# Patient Record
Sex: FEMALE | Race: WHITE | Employment: OTHER | URBAN - METROPOLITAN AREA
[De-identification: names, ages, dates, MRNs, and addresses within clinical notes are randomized per-mention and may not be internally consistent; named-entity substitution may affect disease eponyms.]

---

## 2017-02-27 ENCOUNTER — OFFICE VISIT (OUTPATIENT)
Dept: CARDIOLOGY CLINIC | Age: 72
End: 2017-02-27

## 2017-02-27 VITALS
HEART RATE: 70 BPM | BODY MASS INDEX: 33.45 KG/M2 | WEIGHT: 200.8 LBS | HEIGHT: 65 IN | DIASTOLIC BLOOD PRESSURE: 88 MMHG | SYSTOLIC BLOOD PRESSURE: 122 MMHG

## 2017-02-27 DIAGNOSIS — E78.2 MIXED HYPERLIPIDEMIA: ICD-10-CM

## 2017-02-27 DIAGNOSIS — I10 ESSENTIAL HYPERTENSION: Primary | ICD-10-CM

## 2017-02-27 DIAGNOSIS — Z82.49 FH: CAD (CORONARY ARTERY DISEASE): ICD-10-CM

## 2017-02-27 PROCEDURE — 1036F TOBACCO NON-USER: CPT | Performed by: INTERNAL MEDICINE

## 2017-02-27 PROCEDURE — 1090F PRES/ABSN URINE INCON ASSESS: CPT | Performed by: INTERNAL MEDICINE

## 2017-02-27 PROCEDURE — 3014F SCREEN MAMMO DOC REV: CPT | Performed by: INTERNAL MEDICINE

## 2017-02-27 PROCEDURE — 99214 OFFICE O/P EST MOD 30 MIN: CPT | Performed by: INTERNAL MEDICINE

## 2017-02-27 PROCEDURE — 1123F ACP DISCUSS/DSCN MKR DOCD: CPT | Performed by: INTERNAL MEDICINE

## 2017-02-27 PROCEDURE — G8427 DOCREV CUR MEDS BY ELIG CLIN: HCPCS | Performed by: INTERNAL MEDICINE

## 2017-02-27 PROCEDURE — 3017F COLORECTAL CA SCREEN DOC REV: CPT | Performed by: INTERNAL MEDICINE

## 2017-02-27 PROCEDURE — G8484 FLU IMMUNIZE NO ADMIN: HCPCS | Performed by: INTERNAL MEDICINE

## 2017-02-27 PROCEDURE — G8419 CALC BMI OUT NRM PARAM NOF/U: HCPCS | Performed by: INTERNAL MEDICINE

## 2017-02-27 PROCEDURE — 4040F PNEUMOC VAC/ADMIN/RCVD: CPT | Performed by: INTERNAL MEDICINE

## 2017-02-27 PROCEDURE — G8399 PT W/DXA RESULTS DOCUMENT: HCPCS | Performed by: INTERNAL MEDICINE

## 2017-03-01 RX ORDER — HYDROCHLOROTHIAZIDE 25 MG/1
TABLET ORAL
Qty: 90 TABLET | Refills: 2 | Status: SHIPPED | OUTPATIENT
Start: 2017-03-01 | End: 2017-12-02 | Stop reason: SDUPTHER

## 2017-03-01 RX ORDER — LISINOPRIL 40 MG/1
TABLET ORAL
Qty: 90 TABLET | Refills: 3 | Status: SHIPPED | OUTPATIENT
Start: 2017-03-01 | End: 2018-02-26 | Stop reason: SDUPTHER

## 2017-03-23 RX ORDER — LEVOTHYROXINE SODIUM 0.1 MG/1
TABLET ORAL
Qty: 90 TABLET | Refills: 3 | Status: SHIPPED | OUTPATIENT
Start: 2017-03-23 | End: 2017-12-05 | Stop reason: SDUPTHER

## 2017-04-06 ENCOUNTER — OFFICE VISIT (OUTPATIENT)
Dept: INTERNAL MEDICINE CLINIC | Age: 72
End: 2017-04-06

## 2017-04-06 VITALS
BODY MASS INDEX: 32.78 KG/M2 | WEIGHT: 197 LBS | TEMPERATURE: 98.2 F | DIASTOLIC BLOOD PRESSURE: 78 MMHG | SYSTOLIC BLOOD PRESSURE: 126 MMHG | RESPIRATION RATE: 18 BRPM | HEART RATE: 84 BPM

## 2017-04-06 DIAGNOSIS — R50.81 FEVER IN OTHER DISEASES: Primary | ICD-10-CM

## 2017-04-06 PROCEDURE — G8399 PT W/DXA RESULTS DOCUMENT: HCPCS | Performed by: INTERNAL MEDICINE

## 2017-04-06 PROCEDURE — G8417 CALC BMI ABV UP PARAM F/U: HCPCS | Performed by: INTERNAL MEDICINE

## 2017-04-06 PROCEDURE — 1036F TOBACCO NON-USER: CPT | Performed by: INTERNAL MEDICINE

## 2017-04-06 PROCEDURE — G8427 DOCREV CUR MEDS BY ELIG CLIN: HCPCS | Performed by: INTERNAL MEDICINE

## 2017-04-06 PROCEDURE — 3017F COLORECTAL CA SCREEN DOC REV: CPT | Performed by: INTERNAL MEDICINE

## 2017-04-06 PROCEDURE — 1090F PRES/ABSN URINE INCON ASSESS: CPT | Performed by: INTERNAL MEDICINE

## 2017-04-06 PROCEDURE — 1123F ACP DISCUSS/DSCN MKR DOCD: CPT | Performed by: INTERNAL MEDICINE

## 2017-04-06 PROCEDURE — 3014F SCREEN MAMMO DOC REV: CPT | Performed by: INTERNAL MEDICINE

## 2017-04-06 PROCEDURE — 4040F PNEUMOC VAC/ADMIN/RCVD: CPT | Performed by: INTERNAL MEDICINE

## 2017-04-06 PROCEDURE — 99213 OFFICE O/P EST LOW 20 MIN: CPT | Performed by: INTERNAL MEDICINE

## 2017-04-06 RX ORDER — AZITHROMYCIN 250 MG/1
TABLET, FILM COATED ORAL
Qty: 6 TABLET | Refills: 0 | Status: SHIPPED | OUTPATIENT
Start: 2017-04-06 | End: 2017-06-08 | Stop reason: ALTCHOICE

## 2017-06-06 DIAGNOSIS — Z00.00 PREVENTATIVE HEALTH CARE: ICD-10-CM

## 2017-06-06 DIAGNOSIS — E03.4 HYPOTHYROIDISM DUE TO ACQUIRED ATROPHY OF THYROID: ICD-10-CM

## 2017-06-07 LAB
A/G RATIO: 1.5 (ref 1.1–2.2)
ALBUMIN SERPL-MCNC: 4 G/DL (ref 3.4–5)
ALP BLD-CCNC: 67 U/L (ref 40–129)
ALT SERPL-CCNC: 16 U/L (ref 10–40)
ANION GAP SERPL CALCULATED.3IONS-SCNC: 11 MMOL/L (ref 3–16)
AST SERPL-CCNC: 15 U/L (ref 15–37)
BASOPHILS ABSOLUTE: 0 K/UL (ref 0–0.2)
BASOPHILS RELATIVE PERCENT: 0.5 %
BILIRUB SERPL-MCNC: 0.7 MG/DL (ref 0–1)
BUN BLDV-MCNC: 22 MG/DL (ref 7–20)
CALCIUM SERPL-MCNC: 10.2 MG/DL (ref 8.3–10.6)
CHLORIDE BLD-SCNC: 106 MMOL/L (ref 99–110)
CHOLESTEROL, TOTAL: 172 MG/DL (ref 0–199)
CO2: 26 MMOL/L (ref 21–32)
CREAT SERPL-MCNC: 0.6 MG/DL (ref 0.6–1.2)
EOSINOPHILS ABSOLUTE: 0.1 K/UL (ref 0–0.6)
EOSINOPHILS RELATIVE PERCENT: 1.7 %
GFR AFRICAN AMERICAN: >60
GFR NON-AFRICAN AMERICAN: >60
GLOBULIN: 2.7 G/DL
GLUCOSE BLD-MCNC: 97 MG/DL (ref 70–99)
HCT VFR BLD CALC: 39.4 % (ref 36–48)
HDLC SERPL-MCNC: 53 MG/DL (ref 40–60)
HEMOGLOBIN: 12.8 G/DL (ref 12–16)
HEPATITIS C ANTIBODY INTERPRETATION: NORMAL
LDL CHOLESTEROL CALCULATED: 81 MG/DL
LYMPHOCYTES ABSOLUTE: 3.2 K/UL (ref 1–5.1)
LYMPHOCYTES RELATIVE PERCENT: 41.5 %
MCH RBC QN AUTO: 29.4 PG (ref 26–34)
MCHC RBC AUTO-ENTMCNC: 32.5 G/DL (ref 31–36)
MCV RBC AUTO: 90.6 FL (ref 80–100)
MONOCYTES ABSOLUTE: 0.5 K/UL (ref 0–1.3)
MONOCYTES RELATIVE PERCENT: 6.4 %
NEUTROPHILS ABSOLUTE: 3.8 K/UL (ref 1.7–7.7)
NEUTROPHILS RELATIVE PERCENT: 49.9 %
PDW BLD-RTO: 14.2 % (ref 12.4–15.4)
PLATELET # BLD: 242 K/UL (ref 135–450)
PMV BLD AUTO: 9.2 FL (ref 5–10.5)
POTASSIUM SERPL-SCNC: 4.1 MMOL/L (ref 3.5–5.1)
RBC # BLD: 4.35 M/UL (ref 4–5.2)
SODIUM BLD-SCNC: 143 MMOL/L (ref 136–145)
TOTAL PROTEIN: 6.7 G/DL (ref 6.4–8.2)
TRIGL SERPL-MCNC: 191 MG/DL (ref 0–150)
TSH REFLEX: 2.13 UIU/ML (ref 0.27–4.2)
VLDLC SERPL CALC-MCNC: 38 MG/DL
WBC # BLD: 7.6 K/UL (ref 4–11)

## 2017-06-08 ENCOUNTER — OFFICE VISIT (OUTPATIENT)
Dept: INTERNAL MEDICINE CLINIC | Age: 72
End: 2017-06-08

## 2017-06-08 VITALS
WEIGHT: 196 LBS | RESPIRATION RATE: 16 BRPM | DIASTOLIC BLOOD PRESSURE: 76 MMHG | HEART RATE: 76 BPM | SYSTOLIC BLOOD PRESSURE: 118 MMHG | BODY MASS INDEX: 32.62 KG/M2

## 2017-06-08 DIAGNOSIS — I10 ESSENTIAL HYPERTENSION: Primary | ICD-10-CM

## 2017-06-08 DIAGNOSIS — E78.2 MIXED HYPERLIPIDEMIA: ICD-10-CM

## 2017-06-08 DIAGNOSIS — E03.4 HYPOTHYROIDISM DUE TO ACQUIRED ATROPHY OF THYROID: ICD-10-CM

## 2017-06-08 DIAGNOSIS — J45.991 COUGH VARIANT ASTHMA: ICD-10-CM

## 2017-06-08 PROCEDURE — 4040F PNEUMOC VAC/ADMIN/RCVD: CPT | Performed by: INTERNAL MEDICINE

## 2017-06-08 PROCEDURE — 3014F SCREEN MAMMO DOC REV: CPT | Performed by: INTERNAL MEDICINE

## 2017-06-08 PROCEDURE — 3017F COLORECTAL CA SCREEN DOC REV: CPT | Performed by: INTERNAL MEDICINE

## 2017-06-08 PROCEDURE — 1090F PRES/ABSN URINE INCON ASSESS: CPT | Performed by: INTERNAL MEDICINE

## 2017-06-08 PROCEDURE — G8417 CALC BMI ABV UP PARAM F/U: HCPCS | Performed by: INTERNAL MEDICINE

## 2017-06-08 PROCEDURE — 99214 OFFICE O/P EST MOD 30 MIN: CPT | Performed by: INTERNAL MEDICINE

## 2017-06-08 PROCEDURE — G8399 PT W/DXA RESULTS DOCUMENT: HCPCS | Performed by: INTERNAL MEDICINE

## 2017-06-08 PROCEDURE — 1123F ACP DISCUSS/DSCN MKR DOCD: CPT | Performed by: INTERNAL MEDICINE

## 2017-06-08 PROCEDURE — G8427 DOCREV CUR MEDS BY ELIG CLIN: HCPCS | Performed by: INTERNAL MEDICINE

## 2017-06-08 PROCEDURE — 1036F TOBACCO NON-USER: CPT | Performed by: INTERNAL MEDICINE

## 2017-06-08 RX ORDER — BUDESONIDE AND FORMOTEROL FUMARATE DIHYDRATE 160; 4.5 UG/1; UG/1
2 AEROSOL RESPIRATORY (INHALATION) EVERY 12 HOURS
Qty: 3 INHALER | Refills: 3 | Status: SHIPPED | OUTPATIENT
Start: 2017-06-08 | End: 2018-12-05 | Stop reason: SDUPTHER

## 2017-07-21 RX ORDER — PRAVASTATIN SODIUM 40 MG
TABLET ORAL
Qty: 90 TABLET | Refills: 3 | Status: SHIPPED | OUTPATIENT
Start: 2017-07-21 | End: 2018-08-31 | Stop reason: SDUPTHER

## 2017-08-28 ENCOUNTER — OFFICE VISIT (OUTPATIENT)
Dept: CARDIOLOGY CLINIC | Age: 72
End: 2017-08-28

## 2017-08-28 VITALS
BODY MASS INDEX: 35.08 KG/M2 | WEIGHT: 198 LBS | HEART RATE: 62 BPM | HEIGHT: 63 IN | DIASTOLIC BLOOD PRESSURE: 72 MMHG | SYSTOLIC BLOOD PRESSURE: 118 MMHG | OXYGEN SATURATION: 98 %

## 2017-08-28 DIAGNOSIS — I10 ESSENTIAL HYPERTENSION: Primary | ICD-10-CM

## 2017-08-28 DIAGNOSIS — Z82.49 FH: CAD (CORONARY ARTERY DISEASE): ICD-10-CM

## 2017-08-28 DIAGNOSIS — E78.2 MIXED HYPERLIPIDEMIA: ICD-10-CM

## 2017-08-28 PROCEDURE — G8399 PT W/DXA RESULTS DOCUMENT: HCPCS | Performed by: INTERNAL MEDICINE

## 2017-08-28 PROCEDURE — 1036F TOBACCO NON-USER: CPT | Performed by: INTERNAL MEDICINE

## 2017-08-28 PROCEDURE — G8427 DOCREV CUR MEDS BY ELIG CLIN: HCPCS | Performed by: INTERNAL MEDICINE

## 2017-08-28 PROCEDURE — 3014F SCREEN MAMMO DOC REV: CPT | Performed by: INTERNAL MEDICINE

## 2017-08-28 PROCEDURE — 3017F COLORECTAL CA SCREEN DOC REV: CPT | Performed by: INTERNAL MEDICINE

## 2017-08-28 PROCEDURE — G8417 CALC BMI ABV UP PARAM F/U: HCPCS | Performed by: INTERNAL MEDICINE

## 2017-08-28 PROCEDURE — 99213 OFFICE O/P EST LOW 20 MIN: CPT | Performed by: INTERNAL MEDICINE

## 2017-08-28 PROCEDURE — 4040F PNEUMOC VAC/ADMIN/RCVD: CPT | Performed by: INTERNAL MEDICINE

## 2017-08-28 PROCEDURE — 1090F PRES/ABSN URINE INCON ASSESS: CPT | Performed by: INTERNAL MEDICINE

## 2017-08-28 PROCEDURE — 1123F ACP DISCUSS/DSCN MKR DOCD: CPT | Performed by: INTERNAL MEDICINE

## 2017-10-31 DIAGNOSIS — R05.9 COUGH: ICD-10-CM

## 2017-10-31 RX ORDER — IPRATROPIUM BROMIDE AND ALBUTEROL SULFATE 2.5; .5 MG/3ML; MG/3ML
1 SOLUTION RESPIRATORY (INHALATION) EVERY 6 HOURS
Qty: 120 VIAL | Refills: 5 | Status: SHIPPED | OUTPATIENT
Start: 2017-10-31 | End: 2021-10-25 | Stop reason: SDUPTHER

## 2017-12-04 RX ORDER — HYDROCHLOROTHIAZIDE 25 MG/1
TABLET ORAL
Qty: 90 TABLET | Refills: 1 | Status: SHIPPED | OUTPATIENT
Start: 2017-12-04 | End: 2018-03-16

## 2017-12-05 ENCOUNTER — OFFICE VISIT (OUTPATIENT)
Dept: INTERNAL MEDICINE CLINIC | Age: 72
End: 2017-12-05

## 2017-12-05 VITALS
WEIGHT: 196 LBS | HEART RATE: 76 BPM | DIASTOLIC BLOOD PRESSURE: 72 MMHG | SYSTOLIC BLOOD PRESSURE: 116 MMHG | OXYGEN SATURATION: 97 % | BODY MASS INDEX: 34.72 KG/M2 | RESPIRATION RATE: 16 BRPM

## 2017-12-05 DIAGNOSIS — E03.4 HYPOTHYROIDISM DUE TO ACQUIRED ATROPHY OF THYROID: ICD-10-CM

## 2017-12-05 DIAGNOSIS — I10 ESSENTIAL HYPERTENSION: ICD-10-CM

## 2017-12-05 DIAGNOSIS — E78.2 MIXED HYPERLIPIDEMIA: ICD-10-CM

## 2017-12-05 DIAGNOSIS — E03.4 HYPOTHYROIDISM DUE TO ACQUIRED ATROPHY OF THYROID: Primary | ICD-10-CM

## 2017-12-05 DIAGNOSIS — R05.9 COUGH: Primary | ICD-10-CM

## 2017-12-05 PROCEDURE — 99214 OFFICE O/P EST MOD 30 MIN: CPT | Performed by: INTERNAL MEDICINE

## 2017-12-05 PROCEDURE — 90662 IIV NO PRSV INCREASED AG IM: CPT | Performed by: INTERNAL MEDICINE

## 2017-12-05 PROCEDURE — 1090F PRES/ABSN URINE INCON ASSESS: CPT | Performed by: INTERNAL MEDICINE

## 2017-12-05 PROCEDURE — 1036F TOBACCO NON-USER: CPT | Performed by: INTERNAL MEDICINE

## 2017-12-05 PROCEDURE — G0008 ADMIN INFLUENZA VIRUS VAC: HCPCS | Performed by: INTERNAL MEDICINE

## 2017-12-05 PROCEDURE — 3014F SCREEN MAMMO DOC REV: CPT | Performed by: INTERNAL MEDICINE

## 2017-12-05 PROCEDURE — G8484 FLU IMMUNIZE NO ADMIN: HCPCS | Performed by: INTERNAL MEDICINE

## 2017-12-05 PROCEDURE — G8427 DOCREV CUR MEDS BY ELIG CLIN: HCPCS | Performed by: INTERNAL MEDICINE

## 2017-12-05 PROCEDURE — 3017F COLORECTAL CA SCREEN DOC REV: CPT | Performed by: INTERNAL MEDICINE

## 2017-12-05 PROCEDURE — 1123F ACP DISCUSS/DSCN MKR DOCD: CPT | Performed by: INTERNAL MEDICINE

## 2017-12-05 PROCEDURE — 4040F PNEUMOC VAC/ADMIN/RCVD: CPT | Performed by: INTERNAL MEDICINE

## 2017-12-05 PROCEDURE — G8417 CALC BMI ABV UP PARAM F/U: HCPCS | Performed by: INTERNAL MEDICINE

## 2017-12-05 PROCEDURE — G8399 PT W/DXA RESULTS DOCUMENT: HCPCS | Performed by: INTERNAL MEDICINE

## 2017-12-05 RX ORDER — LEVOTHYROXINE SODIUM 112 UG/1
TABLET ORAL
Qty: 90 TABLET | Refills: 1 | Status: SHIPPED | OUTPATIENT
Start: 2017-12-05 | End: 2018-06-04 | Stop reason: SDUPTHER

## 2017-12-05 RX ORDER — OMEPRAZOLE 40 MG/1
40 CAPSULE, DELAYED RELEASE ORAL
Qty: 30 CAPSULE | Refills: 3 | Status: SHIPPED | OUTPATIENT
Start: 2017-12-05 | End: 2018-11-20

## 2017-12-05 NOTE — PROGRESS NOTES
Andrei Gavin  1945 12/05/17    SUBJECTIVE:      Pt with 3 months of a cough she often wakes at night with the cough. Sometimes the cough is productive of thick white or yellow sputum. She denies any SOB, wheezing. She does have intermittent heartburn. Pt is going to start the botox injections for the torticullis. The patient is taking hypertensive medications compliantly without side effects. Denies chest pain, dyspnea, edema, or TIA's. Patient denies any chest pain, shortness of breath, myalgias, Patient is tolerating cholesterol medications without difficulty. Pt continues on synthroid for hypothyroidism     OBJECTIVE:    /72   Pulse 76   Resp 16   Wt 196 lb (88.9 kg)   SpO2 97%   Breastfeeding? No   BMI 34.72 kg/m²     Physical Exam   Constitutional: She is oriented to person, place, and time. She appears well-developed and well-nourished. Eyes: Conjunctivae are normal. No scleral icterus. Neck: Neck supple. No tracheal deviation present. No thyromegaly present. Cardiovascular: Normal rate, regular rhythm and intact distal pulses. Exam reveals no gallop and no friction rub. No murmur heard. Pulmonary/Chest: No respiratory distress. She has no wheezes. She has no rales. Abdominal: Soft. Bowel sounds are normal. She exhibits no distension and no mass. There is no hepatomegaly. There is no tenderness. There is no rebound and no guarding. Lymphadenopathy:     She has no cervical adenopathy. Neurological: She is alert and oriented to person, place, and time. Skin: No cyanosis. Nails show no clubbing. Psychiatric: She has a normal mood and affect. Her behavior is normal. Judgment normal.       ASSESSMENT:    1. Cough    2. Essential hypertension    3. Mixed hyperlipidemia    4. Hypothyroidism due to acquired atrophy of thyroid        PLAN:    Celia was seen today for 6 month follow-up and cough.     Diagnoses and all orders for this visit:    Cough - will try omeprazole, and if not improving pt will call in 2 weeks at which time I will stop the omeprazole and start claritin D  -     omeprazole (PRILOSEC) 40 MG delayed release capsule; Take 1 capsule by mouth daily (with breakfast)    Essential hypertension - at goal, no change; check labs  -     Comprehensive Metabolic Panel; Future  -     Lipid Panel; Future  -     CBC Auto Differential; Future    Mixed hyperlipidemia - check labs  -     Comprehensive Metabolic Panel; Future  -     Lipid Panel; Future    Hypothyroidism due to acquired atrophy of thyroid - check labs  -     TSH with Reflex;  Future    Other orders  -     INFLUENZA, HIGH DOSE, 65 YRS +, IM, PF, PREFILL SYR, 0.5ML (FLUZONE HD)

## 2018-02-01 RX ORDER — METOPROLOL SUCCINATE 50 MG/1
TABLET, EXTENDED RELEASE ORAL
Qty: 90 TABLET | Refills: 3 | Status: SHIPPED | OUTPATIENT
Start: 2018-02-01 | End: 2019-04-12 | Stop reason: SDUPTHER

## 2018-02-26 RX ORDER — LISINOPRIL 40 MG/1
TABLET ORAL
Qty: 90 TABLET | Refills: 3 | Status: SHIPPED | OUTPATIENT
Start: 2018-02-26 | End: 2019-03-30 | Stop reason: SDUPTHER

## 2018-03-16 ENCOUNTER — OFFICE VISIT (OUTPATIENT)
Dept: INTERNAL MEDICINE CLINIC | Age: 73
End: 2018-03-16

## 2018-03-16 VITALS
RESPIRATION RATE: 16 BRPM | SYSTOLIC BLOOD PRESSURE: 140 MMHG | OXYGEN SATURATION: 98 % | WEIGHT: 196 LBS | HEART RATE: 70 BPM | BODY MASS INDEX: 34.72 KG/M2 | DIASTOLIC BLOOD PRESSURE: 70 MMHG

## 2018-03-16 DIAGNOSIS — I10 ESSENTIAL HYPERTENSION: ICD-10-CM

## 2018-03-16 DIAGNOSIS — M10.072 ACUTE IDIOPATHIC GOUT INVOLVING TOE OF LEFT FOOT: Primary | ICD-10-CM

## 2018-03-16 PROCEDURE — 3014F SCREEN MAMMO DOC REV: CPT | Performed by: INTERNAL MEDICINE

## 2018-03-16 PROCEDURE — 1090F PRES/ABSN URINE INCON ASSESS: CPT | Performed by: INTERNAL MEDICINE

## 2018-03-16 PROCEDURE — 1036F TOBACCO NON-USER: CPT | Performed by: INTERNAL MEDICINE

## 2018-03-16 PROCEDURE — G8399 PT W/DXA RESULTS DOCUMENT: HCPCS | Performed by: INTERNAL MEDICINE

## 2018-03-16 PROCEDURE — G8482 FLU IMMUNIZE ORDER/ADMIN: HCPCS | Performed by: INTERNAL MEDICINE

## 2018-03-16 PROCEDURE — G8417 CALC BMI ABV UP PARAM F/U: HCPCS | Performed by: INTERNAL MEDICINE

## 2018-03-16 PROCEDURE — G8427 DOCREV CUR MEDS BY ELIG CLIN: HCPCS | Performed by: INTERNAL MEDICINE

## 2018-03-16 PROCEDURE — 1123F ACP DISCUSS/DSCN MKR DOCD: CPT | Performed by: INTERNAL MEDICINE

## 2018-03-16 PROCEDURE — G8510 SCR DEP NEG, NO PLAN REQD: HCPCS | Performed by: INTERNAL MEDICINE

## 2018-03-16 PROCEDURE — 99213 OFFICE O/P EST LOW 20 MIN: CPT | Performed by: INTERNAL MEDICINE

## 2018-03-16 PROCEDURE — 3288F FALL RISK ASSESSMENT DOCD: CPT | Performed by: INTERNAL MEDICINE

## 2018-03-16 PROCEDURE — 3017F COLORECTAL CA SCREEN DOC REV: CPT | Performed by: INTERNAL MEDICINE

## 2018-03-16 PROCEDURE — 4040F PNEUMOC VAC/ADMIN/RCVD: CPT | Performed by: INTERNAL MEDICINE

## 2018-03-16 RX ORDER — COLCHICINE 0.6 MG/1
0.6 TABLET ORAL 2 TIMES DAILY
Qty: 60 TABLET | Refills: 5 | Status: SHIPPED | OUTPATIENT
Start: 2018-03-16

## 2018-03-16 RX ORDER — SPIRONOLACTONE 25 MG/1
25 TABLET ORAL DAILY
Qty: 30 TABLET | Refills: 5 | Status: SHIPPED | OUTPATIENT
Start: 2018-03-16 | End: 2018-09-25 | Stop reason: SDUPTHER

## 2018-03-16 ASSESSMENT — PATIENT HEALTH QUESTIONNAIRE - PHQ9
2. FEELING DOWN, DEPRESSED OR HOPELESS: 0
SUM OF ALL RESPONSES TO PHQ9 QUESTIONS 1 & 2: 0
SUM OF ALL RESPONSES TO PHQ QUESTIONS 1-9: 0
1. LITTLE INTEREST OR PLEASURE IN DOING THINGS: 0

## 2018-03-16 NOTE — PROGRESS NOTES
[facility]  1945 03/16/18    SUBJECTIVE:    Pt complains of 1st left great toe pain, swelling, erythema, heat. This happened a few months ago and resolved, then recurred yesterday morning. Last night the pain was terrible. OBJECTIVE:    BP (!) 140/70   Pulse 70   Resp 16   Wt 196 lb (88.9 kg)   SpO2 98%   Breastfeeding? No   BMI 34.72 kg/m²     Physical Exam    Swelling, erythema, warmth, tenderness left 1st MP joint - swelling involves entire foot. ASSESSMENT:    1. Acute idiopathic gout involving toe of left foot    2. Essential hypertension        PLAN:    Gee Juarez was seen today for foot pain. Diagnoses and all orders for this visit:     Acute idiopathic gout involving toe of left foot - start colchicine  -     colchicine (COLCRYS) 0.6 MG tablet; Take 1 tablet by mouth 2 times daily  -     Basic Metabolic Panel; Future    Essential hypertension - stop hctz (may contgribute to gout). Start aldactone, check BMP wednesday  -     spironolactone (ALDACTONE) 25 MG tablet; Take 1 tablet by mouth daily  -     Basic Metabolic Panel;  Future

## 2018-03-23 LAB
BUN / CREAT RATIO: 28 (CALC) (ref 7–25)
BUN BLDV-MCNC: 22 MG/DL (ref 3–29)
CALCIUM SERPL-MCNC: 10.3 MG/DL (ref 8.5–10.5)
CHLORIDE BLD-SCNC: 107 MEQ/L (ref 96–110)
CO2: 26 MEQ/L (ref 19–32)
COPY(IES) SENT TO:: NORMAL
CREAT SERPL-MCNC: 0.8 MG/DL
GFR SERPL CREATININE-BSD FRML MDRD: 74 ML/MIN/1.73M2
GLUCOSE BLD-MCNC: 96 MG/DL
POTASSIUM SERPL-SCNC: 4.2 MEQ/L (ref 3.4–5.3)
SODIUM BLD-SCNC: 144 MEQ/L (ref 135–148)

## 2018-03-27 ENCOUNTER — HOSPITAL ENCOUNTER (OUTPATIENT)
Dept: GENERAL RADIOLOGY | Age: 73
Discharge: HOME | End: 2018-03-27
Attending: INTERNAL MEDICINE | Admitting: INTERNAL MEDICINE

## 2018-03-27 DIAGNOSIS — M79.672 LEFT FOOT PAIN: ICD-10-CM

## 2018-03-27 DIAGNOSIS — M10.072 IDIOPATHIC GOUT OF LEFT FOOT, UNSPECIFIED CHRONICITY: ICD-10-CM

## 2018-03-27 DIAGNOSIS — M79.672 LEFT FOOT PAIN: Primary | ICD-10-CM

## 2018-03-27 RX ORDER — PREDNISONE 10 MG/1
TABLET ORAL
Qty: 20 TABLET | Refills: 0 | Status: SHIPPED | OUTPATIENT
Start: 2018-03-27 | End: 2018-04-11 | Stop reason: ALTCHOICE

## 2018-04-11 ENCOUNTER — OFFICE VISIT (OUTPATIENT)
Dept: INTERNAL MEDICINE CLINIC | Age: 73
End: 2018-04-11

## 2018-04-11 VITALS
RESPIRATION RATE: 16 BRPM | BODY MASS INDEX: 34.54 KG/M2 | SYSTOLIC BLOOD PRESSURE: 130 MMHG | DIASTOLIC BLOOD PRESSURE: 76 MMHG | HEART RATE: 80 BPM | WEIGHT: 195 LBS

## 2018-04-11 DIAGNOSIS — M10.072 ACUTE IDIOPATHIC GOUT INVOLVING TOE OF LEFT FOOT: Primary | ICD-10-CM

## 2018-04-11 DIAGNOSIS — Z12.31 ENCOUNTER FOR SCREENING MAMMOGRAM FOR BREAST CANCER: ICD-10-CM

## 2018-04-11 PROCEDURE — G8427 DOCREV CUR MEDS BY ELIG CLIN: HCPCS | Performed by: INTERNAL MEDICINE

## 2018-04-11 PROCEDURE — 3017F COLORECTAL CA SCREEN DOC REV: CPT | Performed by: INTERNAL MEDICINE

## 2018-04-11 PROCEDURE — 3014F SCREEN MAMMO DOC REV: CPT | Performed by: INTERNAL MEDICINE

## 2018-04-11 PROCEDURE — G8417 CALC BMI ABV UP PARAM F/U: HCPCS | Performed by: INTERNAL MEDICINE

## 2018-04-11 PROCEDURE — 99213 OFFICE O/P EST LOW 20 MIN: CPT | Performed by: INTERNAL MEDICINE

## 2018-04-11 PROCEDURE — 1123F ACP DISCUSS/DSCN MKR DOCD: CPT | Performed by: INTERNAL MEDICINE

## 2018-04-11 PROCEDURE — G8399 PT W/DXA RESULTS DOCUMENT: HCPCS | Performed by: INTERNAL MEDICINE

## 2018-04-11 PROCEDURE — 1090F PRES/ABSN URINE INCON ASSESS: CPT | Performed by: INTERNAL MEDICINE

## 2018-04-11 PROCEDURE — 1036F TOBACCO NON-USER: CPT | Performed by: INTERNAL MEDICINE

## 2018-04-11 PROCEDURE — 4040F PNEUMOC VAC/ADMIN/RCVD: CPT | Performed by: INTERNAL MEDICINE

## 2018-04-11 RX ORDER — ALLOPURINOL 100 MG/1
100 TABLET ORAL DAILY
Qty: 30 TABLET | Refills: 11 | Status: SHIPPED | OUTPATIENT
Start: 2018-04-11 | End: 2019-07-01 | Stop reason: SDUPTHER

## 2018-04-17 ENCOUNTER — HOSPITAL ENCOUNTER (OUTPATIENT)
Dept: WOMENS IMAGING | Age: 73
Discharge: HOME | End: 2018-04-17
Attending: INTERNAL MEDICINE | Admitting: INTERNAL MEDICINE

## 2018-04-17 DIAGNOSIS — Z12.31 ENCOUNTER FOR SCREENING MAMMOGRAM FOR BREAST CANCER: ICD-10-CM

## 2018-04-26 ENCOUNTER — OFFICE VISIT (OUTPATIENT)
Dept: CARDIOLOGY CLINIC | Age: 73
End: 2018-04-26

## 2018-04-26 VITALS
HEART RATE: 72 BPM | DIASTOLIC BLOOD PRESSURE: 66 MMHG | SYSTOLIC BLOOD PRESSURE: 92 MMHG | BODY MASS INDEX: 32.49 KG/M2 | WEIGHT: 195 LBS | HEIGHT: 65 IN

## 2018-04-26 DIAGNOSIS — E78.2 MIXED HYPERLIPIDEMIA: Primary | ICD-10-CM

## 2018-04-26 DIAGNOSIS — I10 ESSENTIAL HYPERTENSION: ICD-10-CM

## 2018-04-26 DIAGNOSIS — Z82.49 FH: CAD (CORONARY ARTERY DISEASE): ICD-10-CM

## 2018-04-26 DIAGNOSIS — R00.2 PALPITATIONS: ICD-10-CM

## 2018-04-26 PROCEDURE — G8417 CALC BMI ABV UP PARAM F/U: HCPCS | Performed by: INTERNAL MEDICINE

## 2018-04-26 PROCEDURE — 4040F PNEUMOC VAC/ADMIN/RCVD: CPT | Performed by: INTERNAL MEDICINE

## 2018-04-26 PROCEDURE — 1090F PRES/ABSN URINE INCON ASSESS: CPT | Performed by: INTERNAL MEDICINE

## 2018-04-26 PROCEDURE — G8399 PT W/DXA RESULTS DOCUMENT: HCPCS | Performed by: INTERNAL MEDICINE

## 2018-04-26 PROCEDURE — 99214 OFFICE O/P EST MOD 30 MIN: CPT | Performed by: INTERNAL MEDICINE

## 2018-04-26 PROCEDURE — 3017F COLORECTAL CA SCREEN DOC REV: CPT | Performed by: INTERNAL MEDICINE

## 2018-04-26 PROCEDURE — 1123F ACP DISCUSS/DSCN MKR DOCD: CPT | Performed by: INTERNAL MEDICINE

## 2018-04-26 PROCEDURE — 1036F TOBACCO NON-USER: CPT | Performed by: INTERNAL MEDICINE

## 2018-04-26 PROCEDURE — G8427 DOCREV CUR MEDS BY ELIG CLIN: HCPCS | Performed by: INTERNAL MEDICINE

## 2018-05-03 ENCOUNTER — HOSPITAL ENCOUNTER (OUTPATIENT)
Dept: WOMENS IMAGING | Age: 73
Discharge: HOME | End: 2018-05-03
Attending: INTERNAL MEDICINE | Admitting: INTERNAL MEDICINE

## 2018-05-03 DIAGNOSIS — R92.8 ABNORMAL MAMMOGRAM: ICD-10-CM

## 2018-05-03 DIAGNOSIS — N64.89 BREAST ASYMMETRY: ICD-10-CM

## 2018-06-04 DIAGNOSIS — E03.4 HYPOTHYROIDISM DUE TO ACQUIRED ATROPHY OF THYROID: ICD-10-CM

## 2018-06-04 DIAGNOSIS — E83.52 SERUM CALCIUM ELEVATED: ICD-10-CM

## 2018-06-04 DIAGNOSIS — I10 ESSENTIAL HYPERTENSION: Primary | ICD-10-CM

## 2018-06-04 RX ORDER — LEVOTHYROXINE SODIUM 0.12 MG/1
TABLET ORAL
Qty: 90 TABLET | Refills: 3 | Status: SHIPPED | OUTPATIENT
Start: 2018-06-04 | End: 2019-08-01 | Stop reason: SDUPTHER

## 2018-06-05 ENCOUNTER — OFFICE VISIT (OUTPATIENT)
Dept: INTERNAL MEDICINE CLINIC | Age: 73
End: 2018-06-05

## 2018-06-05 VITALS
OXYGEN SATURATION: 98 % | SYSTOLIC BLOOD PRESSURE: 114 MMHG | WEIGHT: 195.2 LBS | HEART RATE: 61 BPM | DIASTOLIC BLOOD PRESSURE: 80 MMHG | RESPIRATION RATE: 18 BRPM | BODY MASS INDEX: 32.99 KG/M2

## 2018-06-05 DIAGNOSIS — E03.4 HYPOTHYROIDISM DUE TO ACQUIRED ATROPHY OF THYROID: Primary | ICD-10-CM

## 2018-06-05 DIAGNOSIS — E83.52 HYPERCALCEMIA: ICD-10-CM

## 2018-06-05 DIAGNOSIS — E83.52 SERUM CALCIUM ELEVATED: ICD-10-CM

## 2018-06-05 DIAGNOSIS — J45.30 MILD PERSISTENT ASTHMA WITHOUT COMPLICATION: ICD-10-CM

## 2018-06-05 DIAGNOSIS — E78.2 MIXED HYPERLIPIDEMIA: ICD-10-CM

## 2018-06-05 DIAGNOSIS — I10 ESSENTIAL HYPERTENSION: ICD-10-CM

## 2018-06-05 DIAGNOSIS — E03.4 HYPOTHYROIDISM DUE TO ACQUIRED ATROPHY OF THYROID: ICD-10-CM

## 2018-06-05 LAB
ANION GAP SERPL CALCULATED.3IONS-SCNC: 14 MMOL/L (ref 3–16)
BUN BLDV-MCNC: 18 MG/DL (ref 7–20)
CALCIUM SERPL-MCNC: 10.2 MG/DL (ref 8.3–10.6)
CHLORIDE BLD-SCNC: 106 MMOL/L (ref 99–110)
CO2: 25 MMOL/L (ref 21–32)
CREAT SERPL-MCNC: 0.6 MG/DL (ref 0.6–1.2)
GFR AFRICAN AMERICAN: >60
GFR NON-AFRICAN AMERICAN: >60
GLUCOSE BLD-MCNC: 100 MG/DL (ref 70–99)
PARATHYROID HORMONE INTACT: 72 PG/ML (ref 14–72)
PHOSPHORUS: 3.1 MG/DL (ref 2.5–4.9)
POTASSIUM SERPL-SCNC: 4.6 MMOL/L (ref 3.5–5.1)
SODIUM BLD-SCNC: 145 MMOL/L (ref 136–145)
VITAMIN D 25-HYDROXY: 23.3 NG/ML

## 2018-06-05 PROCEDURE — G8399 PT W/DXA RESULTS DOCUMENT: HCPCS | Performed by: INTERNAL MEDICINE

## 2018-06-05 PROCEDURE — 1090F PRES/ABSN URINE INCON ASSESS: CPT | Performed by: INTERNAL MEDICINE

## 2018-06-05 PROCEDURE — 1036F TOBACCO NON-USER: CPT | Performed by: INTERNAL MEDICINE

## 2018-06-05 PROCEDURE — G8427 DOCREV CUR MEDS BY ELIG CLIN: HCPCS | Performed by: INTERNAL MEDICINE

## 2018-06-05 PROCEDURE — 3017F COLORECTAL CA SCREEN DOC REV: CPT | Performed by: INTERNAL MEDICINE

## 2018-06-05 PROCEDURE — 1123F ACP DISCUSS/DSCN MKR DOCD: CPT | Performed by: INTERNAL MEDICINE

## 2018-06-05 PROCEDURE — G8417 CALC BMI ABV UP PARAM F/U: HCPCS | Performed by: INTERNAL MEDICINE

## 2018-06-05 PROCEDURE — 99214 OFFICE O/P EST MOD 30 MIN: CPT | Performed by: INTERNAL MEDICINE

## 2018-06-05 PROCEDURE — 4040F PNEUMOC VAC/ADMIN/RCVD: CPT | Performed by: INTERNAL MEDICINE

## 2018-06-26 ENCOUNTER — TELEPHONE (OUTPATIENT)
Dept: FAMILY MEDICINE CLINIC | Age: 73
End: 2018-06-26

## 2018-06-26 RX ORDER — PREDNISONE 10 MG/1
TABLET ORAL
Qty: 20 TABLET | Refills: 0 | Status: SHIPPED | OUTPATIENT
Start: 2018-06-26 | End: 2018-10-23

## 2018-07-17 DIAGNOSIS — E03.4 HYPOTHYROIDISM DUE TO ACQUIRED ATROPHY OF THYROID: Primary | ICD-10-CM

## 2018-07-17 LAB — TSH REFLEX: 1.78 UIU/ML (ref 0.27–4.2)

## 2018-09-25 ENCOUNTER — TELEPHONE (OUTPATIENT)
Dept: INTERNAL MEDICINE CLINIC | Age: 73
End: 2018-09-25

## 2018-09-25 DIAGNOSIS — I10 ESSENTIAL HYPERTENSION: ICD-10-CM

## 2018-09-25 RX ORDER — SPIRONOLACTONE 25 MG/1
25 TABLET ORAL DAILY
Qty: 90 TABLET | Refills: 3 | Status: SHIPPED | OUTPATIENT
Start: 2018-09-25 | End: 2019-11-01 | Stop reason: SDUPTHER

## 2018-10-23 ENCOUNTER — OFFICE VISIT (OUTPATIENT)
Dept: CARDIOLOGY CLINIC | Age: 73
End: 2018-10-23
Payer: MEDICARE

## 2018-10-23 ENCOUNTER — NURSE ONLY (OUTPATIENT)
Dept: CARDIOLOGY CLINIC | Age: 73
End: 2018-10-23
Payer: MEDICARE

## 2018-10-23 VITALS
SYSTOLIC BLOOD PRESSURE: 110 MMHG | WEIGHT: 196.4 LBS | HEIGHT: 64 IN | HEART RATE: 64 BPM | DIASTOLIC BLOOD PRESSURE: 76 MMHG | BODY MASS INDEX: 33.53 KG/M2

## 2018-10-23 DIAGNOSIS — E83.52 HYPERCALCEMIA: ICD-10-CM

## 2018-10-23 DIAGNOSIS — Z82.49 FH: CAD (CORONARY ARTERY DISEASE): ICD-10-CM

## 2018-10-23 DIAGNOSIS — I10 ESSENTIAL HYPERTENSION: ICD-10-CM

## 2018-10-23 DIAGNOSIS — E78.2 MIXED HYPERLIPIDEMIA: ICD-10-CM

## 2018-10-23 DIAGNOSIS — E03.4 HYPOTHYROIDISM DUE TO ACQUIRED ATROPHY OF THYROID: ICD-10-CM

## 2018-10-23 DIAGNOSIS — R00.2 PALPITATIONS: Primary | ICD-10-CM

## 2018-10-23 PROCEDURE — 1101F PT FALLS ASSESS-DOCD LE1/YR: CPT | Performed by: INTERNAL MEDICINE

## 2018-10-23 PROCEDURE — 93225 XTRNL ECG REC<48 HRS REC: CPT | Performed by: INTERNAL MEDICINE

## 2018-10-23 PROCEDURE — G8484 FLU IMMUNIZE NO ADMIN: HCPCS | Performed by: INTERNAL MEDICINE

## 2018-10-23 PROCEDURE — 3017F COLORECTAL CA SCREEN DOC REV: CPT | Performed by: INTERNAL MEDICINE

## 2018-10-23 PROCEDURE — G8399 PT W/DXA RESULTS DOCUMENT: HCPCS | Performed by: INTERNAL MEDICINE

## 2018-10-23 PROCEDURE — 1090F PRES/ABSN URINE INCON ASSESS: CPT | Performed by: INTERNAL MEDICINE

## 2018-10-23 PROCEDURE — G8417 CALC BMI ABV UP PARAM F/U: HCPCS | Performed by: INTERNAL MEDICINE

## 2018-10-23 PROCEDURE — 1036F TOBACCO NON-USER: CPT | Performed by: INTERNAL MEDICINE

## 2018-10-23 PROCEDURE — 4040F PNEUMOC VAC/ADMIN/RCVD: CPT | Performed by: INTERNAL MEDICINE

## 2018-10-23 PROCEDURE — 1123F ACP DISCUSS/DSCN MKR DOCD: CPT | Performed by: INTERNAL MEDICINE

## 2018-10-23 PROCEDURE — G8427 DOCREV CUR MEDS BY ELIG CLIN: HCPCS | Performed by: INTERNAL MEDICINE

## 2018-10-23 PROCEDURE — 99214 OFFICE O/P EST MOD 30 MIN: CPT | Performed by: INTERNAL MEDICINE

## 2018-10-23 NOTE — PROGRESS NOTES
Elevated Lipids Sister         x3    Hypertension Sister    Digna Artis Atrial Fibrillation Sister     Elevated Lipids Daughter         x1     Social History   Substance Use Topics    Smoking status: Never Smoker    Smokeless tobacco: Never Used      Comment: reviewed 8/23/16    Alcohol use 0.0 oz/week      Comment: wine 2x month, rarely        Review of Systems:   · Constitutional: No Fever or Weight Loss   · Eyes: No Decreased Vision  · ENT: No Headaches, Hearing Loss or Vertigo  · Cardiovascular: as per note above   · Respiratory: No cough or wheezing and as per note above. · Gastrointestinal: No abdominal pain, appetite loss, blood in stools, constipation, diarrhea or heartburn  · Genitourinary: No dysuria, trouble voiding, or hematuria  · Musculoskeletal:  None  · Integumentary: No rash or pruritis  · Neurological: No TIA or stroke symptoms  · Psychiatric: No anxiety or depression  · Endocrine: No malaise, fatigue or temperature intolerance  · Hematologic/Lymphatic: No bleeding problems, blood clots or swollen lymph nodes  · Allergic/Immunologic: No nasal congestion or hives    Objective:      Physical Exam:  /76 (Site: Left Upper Arm, Position: Sitting, Cuff Size: Large Adult)   Pulse 64   Ht 5' 4\" (1.626 m)   Wt 196 lb 6.4 oz (89.1 kg)   BMI 33.71 kg/m²   Wt Readings from Last 3 Encounters:   10/23/18 196 lb 6.4 oz (89.1 kg)   06/05/18 195 lb 3.2 oz (88.5 kg)   04/26/18 195 lb (88.5 kg)     Body mass index is 33.71 kg/m². Vitals:    10/23/18 1052   BP: 110/76   Pulse: 64        General Appearance:  No distress, conversant  Constitutional:  Well developed, Well nourished, No acute distress, Non-toxic appearance. HENT:  Normocephalic, Atraumatic, Bilateral external ears normal, Oropharynx moist, No oral exudates, Nose normal. Neck- Normal range of motion, No tenderness, Supple, No stridor,no apical-carotid delay  Eyes:  PERRL, EOMI, Conjunctiva normal, No discharge.    Respiratory:  Normal breath

## 2018-10-23 NOTE — PROGRESS NOTES
48 hour holter monitor applied @1130 AM for palpitations Serial # [id number] . Instructed patient on monitor and proper use. Instructed on diary. When to remove and bring it back. Must leave the holter monitor on  without removing for the duration of time ordered. Answered all questions the patient had. Instructed patient to call [facility] at [phone] with any questions or concerns with the monitor.

## 2018-10-30 ENCOUNTER — PROCEDURE VISIT (OUTPATIENT)
Dept: CARDIOLOGY CLINIC | Age: 73
End: 2018-10-30
Payer: MEDICARE

## 2018-10-30 DIAGNOSIS — I49.3 PVC (PREMATURE VENTRICULAR CONTRACTION): ICD-10-CM

## 2018-10-30 DIAGNOSIS — R00.2 PALPITATIONS: Primary | ICD-10-CM

## 2018-10-30 DIAGNOSIS — R00.2 PALPITATIONS: ICD-10-CM

## 2018-10-30 DIAGNOSIS — Z82.49 FH: CAD (CORONARY ARTERY DISEASE): ICD-10-CM

## 2018-10-30 DIAGNOSIS — E83.52 HYPERCALCEMIA: ICD-10-CM

## 2018-10-30 DIAGNOSIS — E78.2 MIXED HYPERLIPIDEMIA: ICD-10-CM

## 2018-10-30 DIAGNOSIS — R94.31 ABNORMAL EKG: Primary | ICD-10-CM

## 2018-10-30 DIAGNOSIS — I10 ESSENTIAL HYPERTENSION: ICD-10-CM

## 2018-10-30 DIAGNOSIS — E03.4 HYPOTHYROIDISM DUE TO ACQUIRED ATROPHY OF THYROID: ICD-10-CM

## 2018-10-30 PROCEDURE — 93015 CV STRESS TEST SUPVJ I&R: CPT | Performed by: INTERNAL MEDICINE

## 2018-11-01 ENCOUNTER — HOSPITAL ENCOUNTER (OUTPATIENT)
Dept: ULTRASOUND IMAGING | Age: 73
Discharge: HOME | End: 2018-11-01
Payer: MEDICARE

## 2018-11-01 ENCOUNTER — HOSPITAL ENCOUNTER (OUTPATIENT)
Dept: WOMENS IMAGING | Age: 73
Discharge: HOME | End: 2018-11-01
Payer: MEDICARE

## 2018-11-01 DIAGNOSIS — Z09 FOLLOW-UP EXAM: ICD-10-CM

## 2018-11-01 PROCEDURE — 76642 ULTRASOUND BREAST LIMITED: CPT

## 2018-11-01 PROCEDURE — 93227 XTRNL ECG REC<48 HR R&I: CPT | Performed by: INTERNAL MEDICINE

## 2018-11-02 ENCOUNTER — PROCEDURE VISIT (OUTPATIENT)
Dept: CARDIOLOGY CLINIC | Age: 73
End: 2018-11-02
Payer: MEDICARE

## 2018-11-02 DIAGNOSIS — R00.2 PALPITATIONS: ICD-10-CM

## 2018-11-02 DIAGNOSIS — I49.3 PVC (PREMATURE VENTRICULAR CONTRACTION): ICD-10-CM

## 2018-11-02 DIAGNOSIS — R94.31 ABNORMAL EKG: ICD-10-CM

## 2018-11-02 DIAGNOSIS — I10 ESSENTIAL HYPERTENSION: ICD-10-CM

## 2018-11-02 LAB
LV EF: 75 %
LVEF MODALITY: NORMAL

## 2018-11-02 PROCEDURE — A9500 TC99M SESTAMIBI: HCPCS | Performed by: INTERNAL MEDICINE

## 2018-11-02 PROCEDURE — 93017 CV STRESS TEST TRACING ONLY: CPT | Performed by: INTERNAL MEDICINE

## 2018-11-02 PROCEDURE — 93016 CV STRESS TEST SUPVJ ONLY: CPT | Performed by: INTERNAL MEDICINE

## 2018-11-02 PROCEDURE — 78452 HT MUSCLE IMAGE SPECT MULT: CPT | Performed by: INTERNAL MEDICINE

## 2018-11-02 PROCEDURE — 93018 CV STRESS TEST I&R ONLY: CPT | Performed by: INTERNAL MEDICINE

## 2018-11-05 ENCOUNTER — TELEPHONE (OUTPATIENT)
Dept: CARDIOLOGY CLINIC | Age: 73
End: 2018-11-05

## 2018-11-05 NOTE — TELEPHONE ENCOUNTER
Stress test 11/2/2018    Summary    Supervising physician Dr. Anitra Harley portion of stress test is negative for ischemia by diagnostic criteria.    Normal EF 75 % with normal ventricular contractility.    Mild To moderate medium size anterior wall ischemia    Abnormal study        Recommendation    Follow up in office to review further management plan        Signatures        ------------------------------------------------------------------    Electronically signed by Geovanna Herman MD (Interpreting    cardiologist) on 11/02/2018 at 17:13     Advised pt of results and she verbalized understanding.  Appt w/Dr. Andreina Hawk scheduled for 11/13/18 @ 11:00 am.  Results faxed to PCP

## 2018-11-09 ENCOUNTER — OFFICE VISIT (OUTPATIENT)
Dept: CARDIOLOGY CLINIC | Age: 73
End: 2018-11-09
Payer: MEDICARE

## 2018-11-09 VITALS
HEART RATE: 72 BPM | WEIGHT: 194 LBS | HEIGHT: 65 IN | SYSTOLIC BLOOD PRESSURE: 122 MMHG | DIASTOLIC BLOOD PRESSURE: 76 MMHG | BODY MASS INDEX: 32.32 KG/M2

## 2018-11-09 DIAGNOSIS — Z82.49 FH: CAD (CORONARY ARTERY DISEASE): Primary | ICD-10-CM

## 2018-11-09 DIAGNOSIS — E03.4 HYPOTHYROIDISM DUE TO ACQUIRED ATROPHY OF THYROID: ICD-10-CM

## 2018-11-09 DIAGNOSIS — R00.2 PALPITATIONS: ICD-10-CM

## 2018-11-09 DIAGNOSIS — E78.2 MIXED HYPERLIPIDEMIA: ICD-10-CM

## 2018-11-09 DIAGNOSIS — I10 ESSENTIAL HYPERTENSION: ICD-10-CM

## 2018-11-09 PROCEDURE — G8417 CALC BMI ABV UP PARAM F/U: HCPCS | Performed by: INTERNAL MEDICINE

## 2018-11-09 PROCEDURE — 3017F COLORECTAL CA SCREEN DOC REV: CPT | Performed by: INTERNAL MEDICINE

## 2018-11-09 PROCEDURE — 99214 OFFICE O/P EST MOD 30 MIN: CPT | Performed by: INTERNAL MEDICINE

## 2018-11-09 PROCEDURE — 1123F ACP DISCUSS/DSCN MKR DOCD: CPT | Performed by: INTERNAL MEDICINE

## 2018-11-09 PROCEDURE — 1101F PT FALLS ASSESS-DOCD LE1/YR: CPT | Performed by: INTERNAL MEDICINE

## 2018-11-09 PROCEDURE — G8427 DOCREV CUR MEDS BY ELIG CLIN: HCPCS | Performed by: INTERNAL MEDICINE

## 2018-11-09 PROCEDURE — G8484 FLU IMMUNIZE NO ADMIN: HCPCS | Performed by: INTERNAL MEDICINE

## 2018-11-09 PROCEDURE — 1036F TOBACCO NON-USER: CPT | Performed by: INTERNAL MEDICINE

## 2018-11-09 PROCEDURE — 4040F PNEUMOC VAC/ADMIN/RCVD: CPT | Performed by: INTERNAL MEDICINE

## 2018-11-09 PROCEDURE — G8399 PT W/DXA RESULTS DOCUMENT: HCPCS | Performed by: INTERNAL MEDICINE

## 2018-11-09 PROCEDURE — 1090F PRES/ABSN URINE INCON ASSESS: CPT | Performed by: INTERNAL MEDICINE

## 2018-11-09 NOTE — PROGRESS NOTES
delay  Eyes:  PERRL, EOMI, Conjunctiva normal, No discharge. Respiratory:  Normal breath sounds, No respiratory distress, No wheezing, No chest tenderness. ,no use of accessory muscles, NO crackles  Cardiovascular: (PMI) apex non displaced,no lifts no thrills,S1 and S2 audible, No added heart sounds, No signs of ankle edema, or volume overload, No evidence of JVD, No crackles  GI:  Bowel sounds normal, Soft, No tenderness, No masses, No gross visceromegaly   :  No costovertebral angle tenderness   Musculoskeletal:  No edema, no tenderness, no deformities.  Back- no tenderness  Integument:  Well hydrated, no rash   Lymphatic:  No lymphadenopathy noted   Neurologic:  Alert & oriented x 3, CN 2-12 normal, normal motor function, normal sensory function, no focal deficits noted   Psychiatric:  Speech and behavior appropriate       Medical decision making and Data review:  DATA:  No results found for: TROPONINT  BNP:  No results found for: PROBNP  PT/INR:  No results found for: PTINR  No results found for: LABA1C  Lab Results   Component Value Date    CHOL 187 06/01/2018    TRIG 208 (H) 06/01/2018    HDL 52 06/01/2018    LDLCALC 93 06/01/2018     Lab Results   Component Value Date    ALT 16 06/01/2018    AST 17 06/01/2018     TSH:   Lab Results   Component Value Date    TSH 4.580 (H) 06/01/2018     Lab Results   Component Value Date    AST 17 06/01/2018    ALT 16 06/01/2018    BILITOT 1.0 06/01/2018    ALKPHOS 65 06/01/2018     No results found for: PROBNP  No results found for: LABA1C  Lab Results   Component Value Date    WBC 7.9 06/01/2018    HGB 13.8 06/01/2018    HCT 41.5 06/01/2018     06/01/2018       Stress test 11/2/18  Summary    Supervising physician Dr. Paty Garcia portion of stress test is negative for ischemia by diagnostic criteria.    Normal EF 75 % with normal ventricular contractility.    Mild To moderate medium size anterior wall ischemia    Abnormal study         Event monitor 10/23/18  Notes recorded by Pia Liu MD on 11/1/2018 at 1:01 PM EDT  Normal 48 hr holter , Min Hr was 50 , max was 104 , average 70, no afib or arrhythmias recorded, 4 beat svt run noted  No diary reported or provided. Continue current meds    Stress test  11/2/18   Conclusions    Summary   Appropriate hemodynamic response to exercise. No significant ST T wave   changes with exercise. EKG portion is negative for ischemia by diagnostic   criteria.   completed 5.8 METS and 4 Mins of exercise, Peak HR was 136, PEak BP was   162/84   Normal stress test     Stress test 11/2/18  Summary    Supervising physician Dr. Poncho Jamil .   S[place] portion of stress test is negative for ischemia by diagnostic criteria.    Normal EF 75 % with normal ventricular contractility.    Mild To moderate medium size anterior wall ischemia    Abnormal study                 All labs, medications and tests reviewed by myself including data and history from outside source , patient and available family . Assessment & Plan:      1. FH: CAD (coronary artery disease)    2. Essential hypertension    3. Mixed hyperlipidemia    4. Hypothyroidism due to acquired atrophy of thyroid    5. Palpitations         Abnormal stress test  WE debated and discused further plan , she is in agreement with proceeding with cardiac cath . Alternates and risks were dicussed , ASA is 2 , Mallampati is 2 , Allens test is normal     Palpitations  H/o PVC , she is on metoprolol tolerating it well , she feesl it worse at night . She says her sister has afib. We will place her on a 48-hour Holter monitor. Continue Toprol-XL and also place her on a treadmill to see if he can unmask any arrhythmias. TSH was normal    Essential hypertension  Blood pressure is well controlled      Dyslipidemia :  All available lab work was reviewed. Triglyceride levels are elevated. She is advised to walk 30 minutes a day.   She may be pre-diabetic      Counseled extensively and medication

## 2018-11-09 NOTE — LETTER
Remberto. to have this lab work done. Phone: [phone] Hours: 7:00 am to 5:00 pm Monday  Friday      PHYSICIAN SIGNATURE:      DATE:                                                      [facility]) Informed Consent for Anesthesia/Sedation, Surgery, Invasive Procedures, and other High-risk Interventions and Medication use      *This consent is applicable for 30 days following patient signature*    Procedure(s)   ICelia authorize, Dr. Lisa Moncada    and the associate(s) or assistant(s) of his/her choice, to perform the following procedure(s): [id number] [facility] [address]  [address]       I know that unexpected conditions may require additional or different procedures than those above. I authorize the above named practitioner(s) perform these as necessary and desirable. This is based on the practitioners professional judgment. The above named practitioner has discussed the above procedure(s) with me, including:  ? Potential benefits, including likelihood of success of the procedure(s) goals  ? Risks  ? Side effects, risk of death, and risk of infection  ? Any potential problems that might occur during recuperation or healing post-procedure  ? Reasonable alternatives  ? Risks of NOT performing the procedure(s)    I acknowledge that no warranty or guarantee has been made to the results the procedure(s). I consent to the above named practitioner(s) providing additional services to me as deemed reasonable and necessary, including but not limited to:    ? Use of medications for anesthesia or sedation. ? All anesthesia and sedation carry risks. My practitioner has discussed my anticipated anesthesia and/or sedation and the risks of using, risk of not using, benefits, side effects, and alternatives. ? Use of pathology  ? I authorize [facility]) to dispose of tissues, specimens or organs when pathology is complete. ?  Use of radiology

## 2018-11-13 ENCOUNTER — HOSPITAL ENCOUNTER (OUTPATIENT)
Age: 73
Discharge: HOME | End: 2018-11-13
Payer: MEDICARE

## 2018-11-13 ENCOUNTER — HOSPITAL ENCOUNTER (OUTPATIENT)
Dept: GENERAL RADIOLOGY | Age: 73
Discharge: HOME | End: 2018-11-13
Payer: MEDICARE

## 2018-11-13 DIAGNOSIS — Z01.811 PRE-OP CHEST EXAM: ICD-10-CM

## 2018-11-13 LAB
ANION GAP SERPL CALCULATED.3IONS-SCNC: 9 MMOL/L (ref 4–16)
APTT: 27.2 SECONDS (ref 21.2–33)
BUN BLDV-MCNC: 21 MG/DL (ref 6–23)
CALCIUM SERPL-MCNC: 10.7 MG/DL (ref 8.3–10.6)
CHLORIDE BLD-SCNC: 103 MMOL/L (ref 99–110)
CO2: 27 MMOL/L (ref 21–32)
CREAT SERPL-MCNC: 0.7 MG/DL (ref 0.6–1.1)
GFR AFRICAN AMERICAN: >60 ML/MIN/1.73M2
GFR NON-AFRICAN AMERICAN: >60 ML/MIN/1.73M2
GLUCOSE BLD-MCNC: 98 MG/DL (ref 70–99)
HCT VFR BLD CALC: 44.5 % (ref 37–47)
HEMOGLOBIN: 14.1 GM/DL (ref 12.5–16)
INR BLD: 1.05 INDEX
MCH RBC QN AUTO: 30 PG (ref 27–31)
MCHC RBC AUTO-ENTMCNC: 31.7 % (ref 32–36)
MCV RBC AUTO: 94.7 FL (ref 78–100)
PDW BLD-RTO: 12.2 % (ref 11.7–14.9)
PLATELET # BLD: 322 K/CU MM (ref 140–440)
PMV BLD AUTO: 10.3 FL (ref 7.5–11.1)
POTASSIUM SERPL-SCNC: 4.7 MMOL/L (ref 3.5–5.1)
PROTHROMBIN TIME: 12 SECONDS (ref 9.12–12.5)
RBC # BLD: 4.7 M/CU MM (ref 4.2–5.4)
SODIUM BLD-SCNC: 139 MMOL/L (ref 135–145)
WBC # BLD: 8.2 K/CU MM (ref 4–10.5)

## 2018-11-13 PROCEDURE — 85610 PROTHROMBIN TIME: CPT

## 2018-11-13 PROCEDURE — 85027 COMPLETE CBC AUTOMATED: CPT

## 2018-11-13 PROCEDURE — 86901 BLOOD TYPING SEROLOGIC RH(D): CPT

## 2018-11-13 PROCEDURE — 85730 THROMBOPLASTIN TIME PARTIAL: CPT

## 2018-11-13 PROCEDURE — 86900 BLOOD TYPING SEROLOGIC ABO: CPT

## 2018-11-13 PROCEDURE — 71046 X-RAY EXAM CHEST 2 VIEWS: CPT

## 2018-11-13 PROCEDURE — 86850 RBC ANTIBODY SCREEN: CPT

## 2018-11-13 PROCEDURE — 80048 BASIC METABOLIC PNL TOTAL CA: CPT

## 2018-11-13 PROCEDURE — 36415 COLL VENOUS BLD VENIPUNCTURE: CPT

## 2018-11-15 ENCOUNTER — HOSPITAL ENCOUNTER (OUTPATIENT)
Dept: CARDIAC CATH/INVASIVE PROCEDURES | Age: 73
Setting detail: OUTPATIENT SURGERY
Discharge: HOME | End: 2018-11-15
Attending: INTERNAL MEDICINE | Admitting: INTERNAL MEDICINE
Payer: MEDICARE

## 2018-11-15 VITALS
DIASTOLIC BLOOD PRESSURE: 56 MMHG | BODY MASS INDEX: 33.12 KG/M2 | WEIGHT: 194 LBS | RESPIRATION RATE: 18 BRPM | SYSTOLIC BLOOD PRESSURE: 89 MMHG | TEMPERATURE: 98.2 F | OXYGEN SATURATION: 99 % | HEART RATE: 61 BPM | HEIGHT: 64 IN

## 2018-11-15 PROCEDURE — 6360000004 HC RX CONTRAST MEDICATION

## 2018-11-15 PROCEDURE — 93458 L HRT ARTERY/VENTRICLE ANGIO: CPT

## 2018-11-15 PROCEDURE — C1887 CATHETER, GUIDING: HCPCS

## 2018-11-15 PROCEDURE — 6370000000 HC RX 637 (ALT 250 FOR IP): Performed by: INTERNAL MEDICINE

## 2018-11-15 PROCEDURE — 6360000002 HC RX W HCPCS

## 2018-11-15 PROCEDURE — 2709999900 HC NON-CHARGEABLE SUPPLY

## 2018-11-15 PROCEDURE — C1769 GUIDE WIRE: HCPCS

## 2018-11-15 PROCEDURE — 93458 L HRT ARTERY/VENTRICLE ANGIO: CPT | Performed by: INTERNAL MEDICINE

## 2018-11-15 PROCEDURE — 2500000003 HC RX 250 WO HCPCS

## 2018-11-15 PROCEDURE — C1894 INTRO/SHEATH, NON-LASER: HCPCS

## 2018-11-15 PROCEDURE — 2580000003 HC RX 258: Performed by: INTERNAL MEDICINE

## 2018-11-15 RX ORDER — SODIUM CHLORIDE 9 MG/ML
INJECTION, SOLUTION INTRAVENOUS CONTINUOUS
Status: DISCONTINUED | OUTPATIENT
Start: 2018-11-15 | End: 2018-11-15 | Stop reason: HOSPADM

## 2018-11-15 RX ORDER — DIPHENHYDRAMINE HCL 25 MG
25 TABLET ORAL ONCE
Status: COMPLETED | OUTPATIENT
Start: 2018-11-15 | End: 2018-11-15

## 2018-11-15 RX ORDER — DIAZEPAM 5 MG/1
5 TABLET ORAL ONCE
Status: COMPLETED | OUTPATIENT
Start: 2018-11-15 | End: 2018-11-15

## 2018-11-15 RX ADMIN — DIAZEPAM 5 MG: 5 TABLET ORAL at 08:48

## 2018-11-15 RX ADMIN — SODIUM CHLORIDE: 9 INJECTION, SOLUTION INTRAVENOUS at 08:48

## 2018-11-15 RX ADMIN — DIPHENHYDRAMINE HCL 25 MG: 25 TABLET ORAL at 08:48

## 2018-11-15 NOTE — FLOWSHEET NOTE
Attempted to remove air from TR Band at this time; bleeding noted. Air replaced. Will retry air removal again in 30 minutes.

## 2018-11-15 NOTE — H&P
Joanna Marte, 68 y.o., female    Primary care physician:  Quentin Santiago MD     Chief Complaint: Chest Pain    History of Present Illness:  HPI:   Jeanna Pitts is a 68y.o. year old who has history as noted below. She used to  see Dr. Vinicio Ordoñez She had an abnormal stress test . She was having palpitations , hypertension and dyslipidemia. She notices her heart beating sensation in night. She denies any dizziness. Her sister was  recently diagnosed of atrial fibrillation required a pacemaker. She has no chest pain but she notices shortness of breath with exertion. She has no ankle swelling. Recent lab work suggested high triglyceride levels. Fasting blood sugars were slightly elevated but she does not have a diagnosis of diabetes. She takes aspirin every day   ASA : 2 , Mallampati class II    Past medical history:    has a past medical history of Arrhythmia; Asthma; Cough variant asthma; Elevated transaminase level; Epicondylitis, lateral; H/O 24 hour EKG monitoring; H/O cardiovascular stress test; H/O echocardiogram; H/O exercise stress test; History of complete ECG; History of nuclear stress test; Hx of cardiovascular stress test; Hx of colonoscopy; Hx of echocardiogram; Hypercalcemia; Hyperlipidemia; Hypertension; Hypothyroidism; Knee pain, bilateral; Mild persistent asthma without complication; Premature ventricular contraction; and Torticollis, spasmodic. Past surgical history:   has a past surgical history that includes Tubal ligation (1989); Dilation and curettage of uterus (1997, 1999); Breast biopsy; Knee arthroscopy (08/2010); and Tonsillectomy (1961). Social History:   reports that she has never smoked. She has never used smokeless tobacco. She reports that she drinks alcohol. She reports that she does not use drugs. Family history:  family history includes Atrial Fibrillation in her sister; Cancer in her father; Diabetes in her mother; Elevated Lipids in her daughter and sister;  Heart Disease

## 2018-11-20 ENCOUNTER — OFFICE VISIT (OUTPATIENT)
Dept: CARDIOLOGY CLINIC | Age: 73
End: 2018-11-20
Payer: MEDICARE

## 2018-11-20 VITALS
WEIGHT: 194.8 LBS | BODY MASS INDEX: 33.26 KG/M2 | SYSTOLIC BLOOD PRESSURE: 100 MMHG | HEIGHT: 64 IN | DIASTOLIC BLOOD PRESSURE: 80 MMHG | HEART RATE: 64 BPM

## 2018-11-20 DIAGNOSIS — E78.2 MIXED HYPERLIPIDEMIA: ICD-10-CM

## 2018-11-20 DIAGNOSIS — R00.2 PALPITATIONS: ICD-10-CM

## 2018-11-20 DIAGNOSIS — Z82.49 FH: CAD (CORONARY ARTERY DISEASE): ICD-10-CM

## 2018-11-20 DIAGNOSIS — R07.2 PRECORDIAL PAIN: ICD-10-CM

## 2018-11-20 DIAGNOSIS — E83.52 HYPERCALCEMIA: ICD-10-CM

## 2018-11-20 DIAGNOSIS — I10 ESSENTIAL HYPERTENSION: Primary | ICD-10-CM

## 2018-11-20 PROCEDURE — 3017F COLORECTAL CA SCREEN DOC REV: CPT | Performed by: INTERNAL MEDICINE

## 2018-11-20 PROCEDURE — G8427 DOCREV CUR MEDS BY ELIG CLIN: HCPCS | Performed by: INTERNAL MEDICINE

## 2018-11-20 PROCEDURE — G8399 PT W/DXA RESULTS DOCUMENT: HCPCS | Performed by: INTERNAL MEDICINE

## 2018-11-20 PROCEDURE — 1101F PT FALLS ASSESS-DOCD LE1/YR: CPT | Performed by: INTERNAL MEDICINE

## 2018-11-20 PROCEDURE — G8417 CALC BMI ABV UP PARAM F/U: HCPCS | Performed by: INTERNAL MEDICINE

## 2018-11-20 PROCEDURE — 1123F ACP DISCUSS/DSCN MKR DOCD: CPT | Performed by: INTERNAL MEDICINE

## 2018-11-20 PROCEDURE — 4040F PNEUMOC VAC/ADMIN/RCVD: CPT | Performed by: INTERNAL MEDICINE

## 2018-11-20 PROCEDURE — 99214 OFFICE O/P EST MOD 30 MIN: CPT | Performed by: INTERNAL MEDICINE

## 2018-11-20 PROCEDURE — G8484 FLU IMMUNIZE NO ADMIN: HCPCS | Performed by: INTERNAL MEDICINE

## 2018-11-20 PROCEDURE — 1090F PRES/ABSN URINE INCON ASSESS: CPT | Performed by: INTERNAL MEDICINE

## 2018-11-20 PROCEDURE — 1036F TOBACCO NON-USER: CPT | Performed by: INTERNAL MEDICINE

## 2018-11-20 NOTE — PROGRESS NOTES
No current facility-administered medications for this visit. Allergies:   Medrol [methylprednisolone] and Floxin [ofloxacin]    Patient History:  Past Medical History:   Diagnosis Date    Arrhythmia     Asthma     Cough variant asthma 2/15/2016    Elevated transaminase level     Epicondylitis, lateral     H/O 24 hour EKG monitoring 9/29/00    SR. freq PVCs. occ couplets. one triplet but no sustained VT. rare PACs, which are benign. one episode of 3 PAC's in a row but no sustained VT    H/O cardiovascular stress test 6/9/09, 4/06, 1/03, 10/01, 9/00 6/9/09: Ray protocol,normal  study. exercise 7 METS     H/O echocardiogram 6/9/09, 4/21/05, 5/02, 9/00 6/9/09: LV normal size and wall thickness. LVSF normal. EF = 50-55%. transmitral spectral dopp flow suggest impaired LV relaxation,  mild mitral regurg    H/O exercise stress test 10/30/2018    treadmill    History of cardiac cath 11/15/2018    SOTELO , LAD and diag are widely patent RCA and Circ are widely patent LVEDP was 10 mmHG    History of complete ECG 5/9/10, 5/11/09, 10/17/02, 10/11/01, 9/27/00    History of nuclear stress test 11/04/2018    Lexiscan, EF 75%, Mild to Mod medium size anteriaor wall ischemia.     Hx of cardiovascular stress test 9/3/2015    cardiolite-normal,EF70%    Hx of colonoscopy     9/13 c-scope WNL; 6/8 C-scope diverticula    Hx of echocardiogram 9/3/15    EF 55%, Trace MR & Trace TR    Hypercalcemia     Hyperlipidemia     Hypertension     6/9 Stress WNL,; 6/9 TTE EF 09-27%, diastolic dysfxn    Hypothyroidism     Knee pain, bilateral     Mild persistent asthma without complication 4/43/7033    Premature ventricular contraction     Torticollis, spasmodic     s/p phenol injections     Past Surgical History:   Procedure Laterality Date    BREAST BIOPSY      left    DILATION AND CURETTAGE OF UTERUS  1997, 1999    KNEE ARTHROSCOPY  08/2010    left, [address] 73 Family History   Problem Relation Age of Onset    Lung Cancer Father     Cancer Father     Diabetes Mother     Stroke Mother     Hypertension Mother     Heart Disease Mother     Heart Disease Sister     Hypertension Sister         x3    Elevated Lipids Sister         x3    Hypertension Sister     Atrial Fibrillation Sister     Elevated Lipids Daughter         x1     Social History   Substance Use Topics    Smoking status: Never Smoker    Smokeless tobacco: Never Used      Comment: reviewed 8/23/16    Alcohol use 0.0 oz/week      Comment: wine 2x month, rarely        Review of Systems:   · Constitutional: No Fever or Weight Loss   · Eyes: No Decreased Vision  · ENT: No Headaches, Hearing Loss or Vertigo  · Cardiovascular: as per note above   · Respiratory: No cough or wheezing and as per note above. · Gastrointestinal: No abdominal pain, appetite loss, blood in stools, constipation, diarrhea or heartburn  · Genitourinary: No dysuria, trouble voiding, or hematuria  · Musculoskeletal:  None  · Integumentary: No rash or pruritis  · Neurological: No TIA or stroke symptoms  · Psychiatric: No anxiety or depression  · Endocrine: No malaise, fatigue or temperature intolerance  · Hematologic/Lymphatic: No bleeding problems, blood clots or swollen lymph nodes  · Allergic/Immunologic: No nasal congestion or hives    Objective:      Physical Exam:  /80 (Site: Left Upper Arm, Position: Sitting, Cuff Size: Large Adult)   Pulse 64   Ht 5' 4\" (1.626 m)   Wt 194 lb 12.8 oz (88.4 kg)   BMI 33.44 kg/m²   Wt Readings from Last 3 Encounters:   11/20/18 194 lb 12.8 oz (88.4 kg)   11/15/18 194 lb (88 kg)   11/09/18 194 lb (88 kg)     Body mass index is 33.44 kg/m². Vitals:    11/20/18 1006   BP: 100/80   Pulse: 64        General Appearance:  No distress, conversant  Constitutional:  Well developed, Well nourished, No acute distress, Non-toxic appearance.    HENT:  Normocephalic, Atraumatic, Bilateral external ears normal, Oropharynx moist, No oral exudates, Nose normal. Neck- Normal range of motion, No tenderness, Supple, No stridor,no apical-carotid delay  Eyes:  PERRL, EOMI, Conjunctiva normal, No discharge. Respiratory:  Normal breath sounds, No respiratory distress, No wheezing, No chest tenderness. ,no use of accessory muscles, NO crackles  Cardiovascular: (PMI) apex non displaced,no lifts no thrills,S1 and S2 audible, No added heart sounds, No signs of ankle edema, or volume overload, No evidence of JVD, No crackles  GI:  Bowel sounds normal, Soft, No tenderness, No masses, No gross visceromegaly   :  No costovertebral angle tenderness   Musculoskeletal:  No edema, no tenderness, no deformities.  Back- no tenderness  Integument:  Well hydrated, no rash   Lymphatic:  No lymphadenopathy noted   Neurologic:  Alert & oriented x 3, CN 2-12 normal, normal motor function, normal sensory function, no focal deficits noted   Psychiatric:  Speech and behavior appropriate       Medical decision making and Data review:  DATA:  No results found for: TROPONINT  BNP:  No results found for: PROBNP  PT/INR:  No results found for: PTINR  No results found for: LABA1C  Lab Results   Component Value Date    CHOL 187 06/01/2018    TRIG 208 (H) 06/01/2018    HDL 52 06/01/2018    LDLCALC 93 06/01/2018     Lab Results   Component Value Date    ALT 16 06/01/2018    AST 17 06/01/2018     TSH:   Lab Results   Component Value Date    TSH 4.580 (H) 06/01/2018     Lab Results   Component Value Date    AST 17 06/01/2018    ALT 16 06/01/2018    BILITOT 1.0 06/01/2018    ALKPHOS 65 06/01/2018     No results found for: PROBNP  No results found for: LABA1C  Lab Results   Component Value Date    WBC 8.2 11/13/2018    HGB 14.1 11/13/2018    HCT 44.5 11/13/2018     11/13/2018       Stress test 11/2/18  Summary    Supervising physician Dr. Gagandeep Lobo portion of stress test is negative for ischemia by diagnostic criteria.    Normal EF 75 % with normal ventricular contractility.    Mild To moderate medium size anterior wall ischemia    Abnormal study         Event monitor 10/23/18  Notes recorded by Jus Price MD on 11/1/2018 at 1:01 PM EDT  Normal 48 hr holter , Min Hr was 50 , max was 104 , average 70, no afib or arrhythmias recorded, 4 beat svt run noted  No diary reported or provided. Continue current meds    Stress test  11/2/18   Conclusions    Summary   Appropriate hemodynamic response to exercise. No significant ST T wave   changes with exercise. EKG portion is negative for ischemia by diagnostic   criteria.   completed 5.8 METS and 4 Mins of exercise, Peak HR was 136, PEak BP was   162/84   Normal stress test     Stress test 11/2/18  Summary    Supervising physician Dr. Adolfo Patrick .   JESUS FAIRBANKSYON portion of stress test is negative for ischemia by diagnostic criteria.    Normal EF 75 % with normal ventricular contractility.    Mild To moderate medium size anterior wall ischemia    Abnormal study         Cath 11/15/18  Procedure Summary   Indication: Abnormal stress test   Access : Radial   1. Left main , LAD and diag are widely patent   2. RCA and Circ are widely patent   3. LVEDP was 10 mmHG     Angiographic Findings    Diagnostic Findings    Cardiac Arteries and Lesion Findings     LMCA: Normal (no stenosis %) and No Angiographicalyl Significant  Disease. widely patent    LAD: Normal (no stenosis %) and No Angiographicalyl Significant Disease. Type 1  Vessel , , diag further bifurcates . LAD and diag are widely patent    LCx: Normal (no stenosis %) and No Angiographicalyl Significant Disease. Large  / Circ OM which goes all the way to apex , at apex it bifurcates , widely  patent    RCA: Normal (no stenosis %) and No Angiographicalyl Significant Disease. Widely  patent , gives PDA which is small , no significant stenosis    Holter 10/23/18    Normal 48 hr holter , Min Hr was 50 , max was 104 , average 70, no afib or arrhythmias recorded, 4 beat svt

## 2018-12-05 ENCOUNTER — OFFICE VISIT (OUTPATIENT)
Dept: INTERNAL MEDICINE CLINIC | Age: 73
End: 2018-12-05
Payer: MEDICARE

## 2018-12-05 VITALS
DIASTOLIC BLOOD PRESSURE: 74 MMHG | OXYGEN SATURATION: 99 % | BODY MASS INDEX: 33.99 KG/M2 | SYSTOLIC BLOOD PRESSURE: 110 MMHG | WEIGHT: 198 LBS | RESPIRATION RATE: 18 BRPM | HEART RATE: 66 BPM

## 2018-12-05 DIAGNOSIS — M1A.00X0 IDIOPATHIC CHRONIC GOUT WITHOUT TOPHUS, UNSPECIFIED SITE: ICD-10-CM

## 2018-12-05 DIAGNOSIS — J45.991 COUGH VARIANT ASTHMA: ICD-10-CM

## 2018-12-05 DIAGNOSIS — I10 ESSENTIAL HYPERTENSION: Primary | ICD-10-CM

## 2018-12-05 DIAGNOSIS — E03.4 HYPOTHYROIDISM DUE TO ACQUIRED ATROPHY OF THYROID: ICD-10-CM

## 2018-12-05 DIAGNOSIS — E78.2 MIXED HYPERLIPIDEMIA: ICD-10-CM

## 2018-12-05 PROCEDURE — 1101F PT FALLS ASSESS-DOCD LE1/YR: CPT | Performed by: INTERNAL MEDICINE

## 2018-12-05 PROCEDURE — G8427 DOCREV CUR MEDS BY ELIG CLIN: HCPCS | Performed by: INTERNAL MEDICINE

## 2018-12-05 PROCEDURE — G8417 CALC BMI ABV UP PARAM F/U: HCPCS | Performed by: INTERNAL MEDICINE

## 2018-12-05 PROCEDURE — 3017F COLORECTAL CA SCREEN DOC REV: CPT | Performed by: INTERNAL MEDICINE

## 2018-12-05 PROCEDURE — G8482 FLU IMMUNIZE ORDER/ADMIN: HCPCS | Performed by: INTERNAL MEDICINE

## 2018-12-05 PROCEDURE — 1090F PRES/ABSN URINE INCON ASSESS: CPT | Performed by: INTERNAL MEDICINE

## 2018-12-05 PROCEDURE — 1036F TOBACCO NON-USER: CPT | Performed by: INTERNAL MEDICINE

## 2018-12-05 PROCEDURE — 1123F ACP DISCUSS/DSCN MKR DOCD: CPT | Performed by: INTERNAL MEDICINE

## 2018-12-05 PROCEDURE — 99214 OFFICE O/P EST MOD 30 MIN: CPT | Performed by: INTERNAL MEDICINE

## 2018-12-05 PROCEDURE — G8399 PT W/DXA RESULTS DOCUMENT: HCPCS | Performed by: INTERNAL MEDICINE

## 2018-12-05 PROCEDURE — 4040F PNEUMOC VAC/ADMIN/RCVD: CPT | Performed by: INTERNAL MEDICINE

## 2018-12-05 RX ORDER — ALBUTEROL SULFATE 90 UG/1
2 POWDER, METERED RESPIRATORY (INHALATION) EVERY 4 HOURS PRN
Qty: 3 INHALER | Refills: 3 | Status: SHIPPED | OUTPATIENT
Start: 2018-12-05 | End: 2022-04-25

## 2018-12-05 RX ORDER — BUDESONIDE AND FORMOTEROL FUMARATE DIHYDRATE 160; 4.5 UG/1; UG/1
2 AEROSOL RESPIRATORY (INHALATION) EVERY 12 HOURS
Qty: 3 INHALER | Refills: 3 | Status: SHIPPED | OUTPATIENT
Start: 2018-12-05 | End: 2021-11-03 | Stop reason: SDUPTHER

## 2018-12-10 DIAGNOSIS — E83.52 SERUM CALCIUM ELEVATED: Primary | ICD-10-CM

## 2018-12-11 DIAGNOSIS — I10 ESSENTIAL HYPERTENSION: ICD-10-CM

## 2018-12-11 DIAGNOSIS — E83.52 SERUM CALCIUM ELEVATED: ICD-10-CM

## 2018-12-11 DIAGNOSIS — E03.4 HYPOTHYROIDISM DUE TO ACQUIRED ATROPHY OF THYROID: ICD-10-CM

## 2018-12-11 DIAGNOSIS — E78.2 MIXED HYPERLIPIDEMIA: ICD-10-CM

## 2018-12-11 LAB — PARATHYROID HORMONE INTACT: 78.5 PG/ML (ref 14–72)

## 2018-12-12 PROBLEM — E21.3 HYPERPARATHYROIDISM (HCC): Status: ACTIVE | Noted: 2018-12-12

## 2018-12-13 ENCOUNTER — OFFICE VISIT (OUTPATIENT)
Dept: INTERNAL MEDICINE CLINIC | Age: 73
End: 2018-12-13
Payer: MEDICARE

## 2018-12-13 VITALS
OXYGEN SATURATION: 98 % | RESPIRATION RATE: 18 BRPM | DIASTOLIC BLOOD PRESSURE: 68 MMHG | HEART RATE: 63 BPM | SYSTOLIC BLOOD PRESSURE: 108 MMHG

## 2018-12-13 DIAGNOSIS — E21.3 HYPERPARATHYROIDISM (HCC): Primary | ICD-10-CM

## 2018-12-13 PROCEDURE — G8399 PT W/DXA RESULTS DOCUMENT: HCPCS | Performed by: INTERNAL MEDICINE

## 2018-12-13 PROCEDURE — 1101F PT FALLS ASSESS-DOCD LE1/YR: CPT | Performed by: INTERNAL MEDICINE

## 2018-12-13 PROCEDURE — 99213 OFFICE O/P EST LOW 20 MIN: CPT | Performed by: INTERNAL MEDICINE

## 2018-12-13 PROCEDURE — G8427 DOCREV CUR MEDS BY ELIG CLIN: HCPCS | Performed by: INTERNAL MEDICINE

## 2018-12-13 PROCEDURE — 1036F TOBACCO NON-USER: CPT | Performed by: INTERNAL MEDICINE

## 2018-12-13 PROCEDURE — 1090F PRES/ABSN URINE INCON ASSESS: CPT | Performed by: INTERNAL MEDICINE

## 2018-12-13 PROCEDURE — G8482 FLU IMMUNIZE ORDER/ADMIN: HCPCS | Performed by: INTERNAL MEDICINE

## 2018-12-13 PROCEDURE — 3017F COLORECTAL CA SCREEN DOC REV: CPT | Performed by: INTERNAL MEDICINE

## 2018-12-13 PROCEDURE — 1123F ACP DISCUSS/DSCN MKR DOCD: CPT | Performed by: INTERNAL MEDICINE

## 2018-12-13 PROCEDURE — 4040F PNEUMOC VAC/ADMIN/RCVD: CPT | Performed by: INTERNAL MEDICINE

## 2018-12-13 PROCEDURE — G8417 CALC BMI ABV UP PARAM F/U: HCPCS | Performed by: INTERNAL MEDICINE

## 2018-12-27 ENCOUNTER — HOSPITAL ENCOUNTER (OUTPATIENT)
Dept: WOMENS IMAGING | Age: 73
Discharge: HOME | End: 2018-12-27
Payer: MEDICARE

## 2018-12-27 DIAGNOSIS — E21.3 HYPERPARATHYROIDISM (HCC): ICD-10-CM

## 2018-12-27 PROCEDURE — 77080 DXA BONE DENSITY AXIAL: CPT

## 2019-04-01 RX ORDER — LISINOPRIL 40 MG/1
TABLET ORAL
Qty: 90 TABLET | Refills: 3 | Status: SHIPPED | OUTPATIENT
Start: 2019-04-01 | End: 2020-05-08 | Stop reason: SDUPTHER

## 2019-04-12 RX ORDER — METOPROLOL SUCCINATE 50 MG/1
TABLET, EXTENDED RELEASE ORAL
Qty: 90 TABLET | Refills: 3 | Status: SHIPPED | OUTPATIENT
Start: 2019-04-12 | End: 2020-06-04

## 2019-05-02 ENCOUNTER — HOSPITAL ENCOUNTER (OUTPATIENT)
Dept: WOMENS IMAGING | Age: 74
Discharge: HOME | End: 2019-05-02
Payer: MEDICARE

## 2019-05-02 ENCOUNTER — HOSPITAL ENCOUNTER (OUTPATIENT)
Dept: ULTRASOUND IMAGING | Age: 74
Discharge: HOME | End: 2019-05-02
Payer: MEDICARE

## 2019-05-02 DIAGNOSIS — R92.8 FOLLOW-UP EXAMINATION OF ABNORMAL MAMMOGRAM: ICD-10-CM

## 2019-05-02 DIAGNOSIS — Z09 FOLLOW-UP EXAM: ICD-10-CM

## 2019-05-02 PROCEDURE — G0279 TOMOSYNTHESIS, MAMMO: HCPCS

## 2019-05-02 PROCEDURE — 76642 ULTRASOUND BREAST LIMITED: CPT

## 2019-05-17 ENCOUNTER — OFFICE VISIT (OUTPATIENT)
Dept: CARDIOLOGY CLINIC | Age: 74
End: 2019-05-17
Payer: MEDICARE

## 2019-05-17 VITALS
BODY MASS INDEX: 33.02 KG/M2 | SYSTOLIC BLOOD PRESSURE: 112 MMHG | WEIGHT: 198.2 LBS | HEIGHT: 65 IN | DIASTOLIC BLOOD PRESSURE: 60 MMHG | HEART RATE: 64 BPM

## 2019-05-17 DIAGNOSIS — I10 ESSENTIAL HYPERTENSION: Primary | ICD-10-CM

## 2019-05-17 DIAGNOSIS — R07.2 PRECORDIAL PAIN: ICD-10-CM

## 2019-05-17 DIAGNOSIS — E78.2 MIXED HYPERLIPIDEMIA: ICD-10-CM

## 2019-05-17 DIAGNOSIS — Z82.49 FH: CAD (CORONARY ARTERY DISEASE): ICD-10-CM

## 2019-05-17 DIAGNOSIS — E03.4 HYPOTHYROIDISM DUE TO ACQUIRED ATROPHY OF THYROID: ICD-10-CM

## 2019-05-17 DIAGNOSIS — R00.2 PALPITATIONS: ICD-10-CM

## 2019-05-17 DIAGNOSIS — G24.3 TORTICOLLIS, SPASMODIC: ICD-10-CM

## 2019-05-17 DIAGNOSIS — E21.3 HYPERPARATHYROIDISM (HCC): ICD-10-CM

## 2019-05-17 DIAGNOSIS — E83.52 HYPERCALCEMIA: ICD-10-CM

## 2019-05-17 PROCEDURE — 1123F ACP DISCUSS/DSCN MKR DOCD: CPT | Performed by: INTERNAL MEDICINE

## 2019-05-17 PROCEDURE — G8399 PT W/DXA RESULTS DOCUMENT: HCPCS | Performed by: INTERNAL MEDICINE

## 2019-05-17 PROCEDURE — 4040F PNEUMOC VAC/ADMIN/RCVD: CPT | Performed by: INTERNAL MEDICINE

## 2019-05-17 PROCEDURE — 3017F COLORECTAL CA SCREEN DOC REV: CPT | Performed by: INTERNAL MEDICINE

## 2019-05-17 PROCEDURE — 1090F PRES/ABSN URINE INCON ASSESS: CPT | Performed by: INTERNAL MEDICINE

## 2019-05-17 PROCEDURE — 1036F TOBACCO NON-USER: CPT | Performed by: INTERNAL MEDICINE

## 2019-05-17 PROCEDURE — G8417 CALC BMI ABV UP PARAM F/U: HCPCS | Performed by: INTERNAL MEDICINE

## 2019-05-17 PROCEDURE — 99213 OFFICE O/P EST LOW 20 MIN: CPT | Performed by: INTERNAL MEDICINE

## 2019-05-17 PROCEDURE — G8427 DOCREV CUR MEDS BY ELIG CLIN: HCPCS | Performed by: INTERNAL MEDICINE

## 2019-05-17 NOTE — PROGRESS NOTES
BREAST BIOPSY      left    DILATION AND CURETTAGE OF UTERUS  1997, 1999    KNEE ARTHROSCOPY  08/2010    left, [place]    TONSILLECTOMY  1961    TUBAL LIGATION  1989     Family History   Problem Relation Age of Onset    Lung Cancer Father     Cancer Father     Diabetes Mother     Stroke Mother     Hypertension Mother     Heart Disease Mother     Heart Disease Sister     Hypertension Sister         x3    Elevated Lipids Sister         x3    Hypertension Sister     Atrial Fibrillation Sister     Elevated Lipids Daughter         x1     Social History     Tobacco Use    Smoking status: Never Smoker    Smokeless tobacco: Never Used    Tobacco comment: reviewed 8/23/16   Substance Use Topics    Alcohol use: Yes     Alcohol/week: 0.0 oz     Comment: wine 2x month, rarely        Review of Systems:   · Constitutional: No Fever or Weight Loss   · Eyes: No Decreased Vision  · ENT: No Headaches, Hearing Loss or Vertigo  · Cardiovascular: as per note above   · Respiratory: No cough or wheezing and as per note above. · Gastrointestinal: No abdominal pain, appetite loss, blood in stools, constipation, diarrhea or heartburn  · Genitourinary: No dysuria, trouble voiding, or hematuria  · Musculoskeletal:  None  · Integumentary: No rash or pruritis  · Neurological: No TIA or stroke symptoms  · Psychiatric: No anxiety or depression  · Endocrine: No malaise, fatigue or temperature intolerance  · Hematologic/Lymphatic: No bleeding problems, blood clots or swollen lymph nodes  · Allergic/Immunologic: No nasal congestion or hives    Objective:      Physical Exam:  /60   Pulse 64   Ht 5' 5\" (1.651 m)   Wt 198 lb 3.2 oz (89.9 kg)   BMI 32.98 kg/m²   Wt Readings from Last 3 Encounters:   05/17/19 198 lb 3.2 oz (89.9 kg)   12/05/18 198 lb (89.8 kg)   11/20/18 194 lb 12.8 oz (88.4 kg)     Body mass index is 32.98 kg/m².   Vitals:    05/17/19 0923   BP: 112/60   Pulse: 64        General Appearance:  No distress, conversant  Constitutional:  Well developed, Well nourished, No acute distress, Non-toxic appearance. HENT:  Normocephalic, Atraumatic, Bilateral external ears normal, Oropharynx moist, No oral exudates, Nose normal. Neck- Normal range of motion, No tenderness, Supple, No stridor,no apical-carotid delay  Eyes:  PERRL, EOMI, Conjunctiva normal, No discharge. Respiratory:  Normal breath sounds, No respiratory distress, No wheezing, No chest tenderness. ,no use of accessory muscles, NO crackles  Cardiovascular: (PMI) apex non displaced,no lifts no thrills,S1 and S2 audible, No added heart sounds, No signs of ankle edema, or volume overload, No evidence of JVD, No crackles  GI:  Bowel sounds normal, Soft, No tenderness, No masses, No gross visceromegaly   :  No costovertebral angle tenderness   Musculoskeletal:  No edema, no tenderness, no deformities.  Back- no tenderness  Integument:  Well hydrated, no rash   Lymphatic:  No lymphadenopathy noted   Neurologic:  Alert & oriented x 3, CN 2-12 normal, normal motor function, normal sensory function, no focal deficits noted   Psychiatric:  Speech and behavior appropriate       Medical decision making and Data review:  DATA:  No results found for: TROPONINT  BNP:  No results found for: PROBNP  PT/INR:  No results found for: PTINR  No results found for: LABA1C  Lab Results   Component Value Date    CHOL 201 (H) 12/07/2018    TRIG 190 (H) 12/07/2018    HDL 58 12/07/2018    LDLCALC 105 (H) 12/07/2018     Lab Results   Component Value Date    ALT 26 12/07/2018    AST 24 12/07/2018     TSH:   Lab Results   Component Value Date    TSH 3.100 12/07/2018     Lab Results   Component Value Date    AST 24 12/07/2018    ALT 26 12/07/2018    BILITOT 1.0 12/07/2018    ALKPHOS 74 12/07/2018     No results found for: PROBNP  No results found for: LABA1C  Lab Results   Component Value Date    WBC 7.3 12/07/2018    HGB 13.8 12/07/2018    HCT 40.9 12/07/2018     12/07/2018 current medications. Appropriate prescriptions are addressed and refills ordered. Questions answered and patient verbalizes understanding. Call for any problems, questions, or concerns. Continue all other medications of all above medical condition listed as is. Return in about 6 months (around 11/17/2019). Please note this report has been partially produced using speech recognition software and may contain errors related to that system including errors in grammar, punctuation, and spelling, as well as words and phrases that may be inappropriate.  If there are any questions or concerns please feel free to contact the dictating provider for clarification.

## 2019-06-05 ENCOUNTER — OFFICE VISIT (OUTPATIENT)
Dept: INTERNAL MEDICINE CLINIC | Age: 74
End: 2019-06-05
Payer: MEDICARE

## 2019-06-05 VITALS
BODY MASS INDEX: 32.78 KG/M2 | SYSTOLIC BLOOD PRESSURE: 120 MMHG | RESPIRATION RATE: 18 BRPM | HEART RATE: 63 BPM | DIASTOLIC BLOOD PRESSURE: 74 MMHG | WEIGHT: 197 LBS | OXYGEN SATURATION: 98 %

## 2019-06-05 DIAGNOSIS — R00.2 PALPITATIONS: ICD-10-CM

## 2019-06-05 DIAGNOSIS — M1A.00X0 IDIOPATHIC CHRONIC GOUT WITHOUT TOPHUS, UNSPECIFIED SITE: ICD-10-CM

## 2019-06-05 DIAGNOSIS — I10 ESSENTIAL HYPERTENSION: ICD-10-CM

## 2019-06-05 DIAGNOSIS — E21.3 HYPERPARATHYROIDISM (HCC): Primary | ICD-10-CM

## 2019-06-05 DIAGNOSIS — E03.4 HYPOTHYROIDISM DUE TO ACQUIRED ATROPHY OF THYROID: ICD-10-CM

## 2019-06-05 DIAGNOSIS — E78.2 MIXED HYPERLIPIDEMIA: ICD-10-CM

## 2019-06-05 PROCEDURE — G8417 CALC BMI ABV UP PARAM F/U: HCPCS | Performed by: INTERNAL MEDICINE

## 2019-06-05 PROCEDURE — G8399 PT W/DXA RESULTS DOCUMENT: HCPCS | Performed by: INTERNAL MEDICINE

## 2019-06-05 PROCEDURE — 4040F PNEUMOC VAC/ADMIN/RCVD: CPT | Performed by: INTERNAL MEDICINE

## 2019-06-05 PROCEDURE — 3017F COLORECTAL CA SCREEN DOC REV: CPT | Performed by: INTERNAL MEDICINE

## 2019-06-05 PROCEDURE — 1036F TOBACCO NON-USER: CPT | Performed by: INTERNAL MEDICINE

## 2019-06-05 PROCEDURE — 1090F PRES/ABSN URINE INCON ASSESS: CPT | Performed by: INTERNAL MEDICINE

## 2019-06-05 PROCEDURE — 1123F ACP DISCUSS/DSCN MKR DOCD: CPT | Performed by: INTERNAL MEDICINE

## 2019-06-05 PROCEDURE — 3288F FALL RISK ASSESSMENT DOCD: CPT | Performed by: INTERNAL MEDICINE

## 2019-06-05 PROCEDURE — G8510 SCR DEP NEG, NO PLAN REQD: HCPCS | Performed by: INTERNAL MEDICINE

## 2019-06-05 PROCEDURE — 99214 OFFICE O/P EST MOD 30 MIN: CPT | Performed by: INTERNAL MEDICINE

## 2019-06-05 PROCEDURE — G8427 DOCREV CUR MEDS BY ELIG CLIN: HCPCS | Performed by: INTERNAL MEDICINE

## 2019-06-05 RX ORDER — ACETAMINOPHEN 160 MG
800 TABLET,DISINTEGRATING ORAL
COMMUNITY
End: 2021-06-18 | Stop reason: DRUGHIGH

## 2019-06-05 ASSESSMENT — PATIENT HEALTH QUESTIONNAIRE - PHQ9
SUM OF ALL RESPONSES TO PHQ QUESTIONS 1-9: 0
2. FEELING DOWN, DEPRESSED OR HOPELESS: 0
SUM OF ALL RESPONSES TO PHQ QUESTIONS 1-9: 0
SUM OF ALL RESPONSES TO PHQ9 QUESTIONS 1 & 2: 0
1. LITTLE INTEREST OR PLEASURE IN DOING THINGS: 0

## 2019-06-05 NOTE — PROGRESS NOTES
atrophy of thyroid    5. Idiopathic chronic gout without tophus, unspecified site    6. Palpitations        PLAN:    Melissa Orellana was seen today for other. Diagnoses and all orders for this visit:    Hyperparathyroidism ([address] - mild, will f/u with endo yearly    Essential hypertension - check labs, BP at goal  -     Comprehensive Metabolic Panel; Future  -     Lipid Panel; Future  -     CBC Auto Differential; Future    Mixed hyperlipidemia - cont statin; check labd  -     Comprehensive Metabolic Panel; Future  -     Lipid Panel; Future    Hypothyroidism due to acquired atrophy of thyroid - check TSH  -     TSH without Reflex;  Future    Idiopathic chronic gout without tophus, unspecified site - may need a referral to [facility] neuro - she will call if needed    Palpitations - discussed at length; w/u (-), no further eval needed

## 2019-06-14 ENCOUNTER — TELEPHONE (OUTPATIENT)
Dept: INTERNAL MEDICINE CLINIC | Age: 74
End: 2019-06-14

## 2019-06-14 DIAGNOSIS — G24.3 TORTICOLLIS, SPASMODIC: Primary | ICD-10-CM

## 2019-06-14 NOTE — TELEPHONE ENCOUNTER
Patient called would like a referral to [facility] for Idiopathic chronic gout. Please advise. Thank you!

## 2019-07-01 DIAGNOSIS — M10.072 ACUTE IDIOPATHIC GOUT INVOLVING TOE OF LEFT FOOT: ICD-10-CM

## 2019-07-01 RX ORDER — ALLOPURINOL 100 MG/1
100 TABLET ORAL DAILY
Qty: 30 TABLET | Refills: 11 | Status: SHIPPED | OUTPATIENT
Start: 2019-07-01 | End: 2020-06-18 | Stop reason: SDUPTHER

## 2019-08-01 RX ORDER — LEVOTHYROXINE SODIUM 0.12 MG/1
TABLET ORAL
Qty: 90 TABLET | Refills: 3 | Status: SHIPPED | OUTPATIENT
Start: 2019-08-01 | End: 2020-06-18 | Stop reason: SDUPTHER

## 2019-08-09 ENCOUNTER — TELEPHONE (OUTPATIENT)
Dept: INTERNAL MEDICINE CLINIC | Age: 74
End: 2019-08-09

## 2019-08-09 DIAGNOSIS — G24.3 CERVICAL DYSTONIA: Primary | ICD-10-CM

## 2019-08-09 NOTE — TELEPHONE ENCOUNTER
The patient called in wanting to know if she could be referred out to [facility] due to the physician she was seeing at the [facility] DR Gayatri Farmer is no longer in office and she stated that she has not had any botic or anything for her cervical dystonia.

## 2019-08-19 ENCOUNTER — TELEPHONE (OUTPATIENT)
Dept: INTERNAL MEDICINE CLINIC | Age: 74
End: 2019-08-19

## 2019-08-19 RX ORDER — TIZANIDINE 2 MG/1
2 TABLET ORAL 3 TIMES DAILY PRN
Qty: 90 TABLET | Refills: 5 | Status: SHIPPED | OUTPATIENT
Start: 2019-08-19 | End: 2020-03-19

## 2019-11-01 DIAGNOSIS — I10 ESSENTIAL HYPERTENSION: ICD-10-CM

## 2019-11-01 RX ORDER — SPIRONOLACTONE 25 MG/1
TABLET ORAL
Qty: 90 TABLET | Refills: 3 | Status: SHIPPED | OUTPATIENT
Start: 2019-11-01 | End: 2020-12-18 | Stop reason: SDUPTHER

## 2019-11-11 ENCOUNTER — HOSPITAL ENCOUNTER (OUTPATIENT)
Dept: ULTRASOUND IMAGING | Age: 74
Discharge: HOME | End: 2019-11-11
Payer: MEDICARE

## 2019-11-11 ENCOUNTER — HOSPITAL ENCOUNTER (OUTPATIENT)
Dept: WOMENS IMAGING | Age: 74
Discharge: HOME | End: 2019-11-11
Payer: MEDICARE

## 2019-11-11 DIAGNOSIS — R92.8 ABNORMAL MAMMOGRAM: ICD-10-CM

## 2019-11-11 PROCEDURE — 77065 DX MAMMO INCL CAD UNI: CPT

## 2019-11-11 PROCEDURE — 76642 ULTRASOUND BREAST LIMITED: CPT

## 2019-11-24 PROBLEM — N60.12 FIBROCYSTIC CHANGES OF LEFT BREAST: Status: ACTIVE | Noted: 2019-11-24

## 2019-12-18 ENCOUNTER — OFFICE VISIT (OUTPATIENT)
Dept: INTERNAL MEDICINE CLINIC | Age: 74
End: 2019-12-18
Payer: MEDICARE

## 2019-12-18 VITALS
BODY MASS INDEX: 32.62 KG/M2 | DIASTOLIC BLOOD PRESSURE: 74 MMHG | SYSTOLIC BLOOD PRESSURE: 122 MMHG | RESPIRATION RATE: 18 BRPM | OXYGEN SATURATION: 98 % | HEART RATE: 67 BPM | WEIGHT: 196 LBS

## 2019-12-18 DIAGNOSIS — E78.2 MIXED HYPERLIPIDEMIA: ICD-10-CM

## 2019-12-18 DIAGNOSIS — I10 ESSENTIAL HYPERTENSION: Primary | ICD-10-CM

## 2019-12-18 DIAGNOSIS — E21.3 HYPERPARATHYROIDISM (HCC): ICD-10-CM

## 2019-12-18 DIAGNOSIS — G24.3 CERVICAL DYSTONIA: ICD-10-CM

## 2019-12-18 DIAGNOSIS — E03.4 HYPOTHYROIDISM DUE TO ACQUIRED ATROPHY OF THYROID: ICD-10-CM

## 2019-12-18 PROCEDURE — G8482 FLU IMMUNIZE ORDER/ADMIN: HCPCS | Performed by: INTERNAL MEDICINE

## 2019-12-18 PROCEDURE — 1090F PRES/ABSN URINE INCON ASSESS: CPT | Performed by: INTERNAL MEDICINE

## 2019-12-18 PROCEDURE — 4040F PNEUMOC VAC/ADMIN/RCVD: CPT | Performed by: INTERNAL MEDICINE

## 2019-12-18 PROCEDURE — G8399 PT W/DXA RESULTS DOCUMENT: HCPCS | Performed by: INTERNAL MEDICINE

## 2019-12-18 PROCEDURE — G8427 DOCREV CUR MEDS BY ELIG CLIN: HCPCS | Performed by: INTERNAL MEDICINE

## 2019-12-18 PROCEDURE — G8417 CALC BMI ABV UP PARAM F/U: HCPCS | Performed by: INTERNAL MEDICINE

## 2019-12-18 PROCEDURE — 3017F COLORECTAL CA SCREEN DOC REV: CPT | Performed by: INTERNAL MEDICINE

## 2019-12-18 PROCEDURE — 1036F TOBACCO NON-USER: CPT | Performed by: INTERNAL MEDICINE

## 2019-12-18 PROCEDURE — 99214 OFFICE O/P EST MOD 30 MIN: CPT | Performed by: INTERNAL MEDICINE

## 2019-12-18 PROCEDURE — 1123F ACP DISCUSS/DSCN MKR DOCD: CPT | Performed by: INTERNAL MEDICINE

## 2020-01-02 RX ORDER — PRAVASTATIN SODIUM 40 MG
TABLET ORAL
Qty: 90 TABLET | Refills: 3 | Status: SHIPPED | OUTPATIENT
Start: 2020-01-02 | End: 2021-02-09

## 2020-03-19 ENCOUNTER — TELEPHONE (OUTPATIENT)
Dept: INTERNAL MEDICINE CLINIC | Age: 75
End: 2020-03-19

## 2020-03-19 RX ORDER — TIZANIDINE 4 MG/1
4 TABLET ORAL 3 TIMES DAILY PRN
Qty: 90 TABLET | Refills: 5 | Status: SHIPPED | OUTPATIENT
Start: 2020-03-19 | End: 2021-04-08 | Stop reason: SDUPTHER

## 2020-03-19 NOTE — TELEPHONE ENCOUNTER
The patient called in stating that she had the botox injection one time at [facility] but really do not want to use the botox. She stated a while back she was started on the zanaflex for muscle pain and it was working well for her but not she seems to be getting neck spasms more frequently and thinks that it is because the medication is not as affective anymore. Patient was wanting to know if you could maybe increase the medication to see if it will help better or if you have any other alternatives that the patient may be able to try for the spasms and muscle pain ?

## 2020-04-07 ENCOUNTER — TELEPHONE (OUTPATIENT)
Dept: INTERNAL MEDICINE CLINIC | Age: 75
End: 2020-04-07

## 2020-04-07 NOTE — TELEPHONE ENCOUNTER
Patient called in stating that she uses zanaflex to help her sleep at night due to her shakes per patient she had an appt with [facility] but they canceled it due to the virus and the patient was wanting to know if she can have her zanaflex increased for sleep. Please advise !

## 2020-05-08 RX ORDER — LISINOPRIL 40 MG/1
40 TABLET ORAL DAILY
Qty: 90 TABLET | Refills: 3 | Status: SHIPPED | OUTPATIENT
Start: 2020-05-08 | End: 2021-07-02

## 2020-06-04 RX ORDER — METOPROLOL SUCCINATE 50 MG/1
TABLET, EXTENDED RELEASE ORAL
Qty: 90 TABLET | Refills: 3 | Status: SHIPPED | OUTPATIENT
Start: 2020-06-04 | End: 2021-09-07

## 2020-06-18 ENCOUNTER — VIRTUAL VISIT (OUTPATIENT)
Dept: INTERNAL MEDICINE CLINIC | Age: 75
End: 2020-06-18
Payer: MEDICARE

## 2020-06-18 PROCEDURE — G8427 DOCREV CUR MEDS BY ELIG CLIN: HCPCS | Performed by: INTERNAL MEDICINE

## 2020-06-18 PROCEDURE — G8399 PT W/DXA RESULTS DOCUMENT: HCPCS | Performed by: INTERNAL MEDICINE

## 2020-06-18 PROCEDURE — 99214 OFFICE O/P EST MOD 30 MIN: CPT | Performed by: INTERNAL MEDICINE

## 2020-06-18 PROCEDURE — 1123F ACP DISCUSS/DSCN MKR DOCD: CPT | Performed by: INTERNAL MEDICINE

## 2020-06-18 PROCEDURE — 4040F PNEUMOC VAC/ADMIN/RCVD: CPT | Performed by: INTERNAL MEDICINE

## 2020-06-18 PROCEDURE — 1090F PRES/ABSN URINE INCON ASSESS: CPT | Performed by: INTERNAL MEDICINE

## 2020-06-18 PROCEDURE — 3017F COLORECTAL CA SCREEN DOC REV: CPT | Performed by: INTERNAL MEDICINE

## 2020-06-18 PROCEDURE — G9899 SCRN MAM PERF RSLTS DOC: HCPCS | Performed by: INTERNAL MEDICINE

## 2020-06-18 RX ORDER — ALLOPURINOL 100 MG/1
100 TABLET ORAL DAILY
Qty: 90 TABLET | Refills: 3 | Status: SHIPPED | OUTPATIENT
Start: 2020-06-18 | End: 2021-07-02

## 2020-06-18 RX ORDER — LEVOTHYROXINE SODIUM 0.12 MG/1
125 TABLET ORAL DAILY
Qty: 90 TABLET | Refills: 3 | Status: SHIPPED | OUTPATIENT
Start: 2020-06-18 | End: 2021-06-16

## 2020-06-18 ASSESSMENT — PATIENT HEALTH QUESTIONNAIRE - PHQ9
2. FEELING DOWN, DEPRESSED OR HOPELESS: 0
SUM OF ALL RESPONSES TO PHQ9 QUESTIONS 1 & 2: 0
1. LITTLE INTEREST OR PLEASURE IN DOING THINGS: 0
SUM OF ALL RESPONSES TO PHQ QUESTIONS 1-9: 0
SUM OF ALL RESPONSES TO PHQ QUESTIONS 1-9: 0

## 2020-09-28 ENCOUNTER — TELEPHONE (OUTPATIENT)
Dept: INTERNAL MEDICINE CLINIC | Age: 75
End: 2020-09-28

## 2020-09-28 RX ORDER — PREDNISONE 10 MG/1
TABLET ORAL
Qty: 20 TABLET | Refills: 0 | Status: SHIPPED | OUTPATIENT
Start: 2020-09-28 | End: 2020-12-18

## 2020-09-28 NOTE — TELEPHONE ENCOUNTER
The pt called in stating that she has poison ivy on her back and was wanting to know if you could possibly send her in a zpak for this ? She stated she goes to [facility] for her neck injection tomorrow and she really needs something for the poison ivy so that it does not start to spread.

## 2020-12-18 ENCOUNTER — VIRTUAL VISIT (OUTPATIENT)
Dept: INTERNAL MEDICINE CLINIC | Age: 75
End: 2020-12-18
Payer: MEDICARE

## 2020-12-18 PROCEDURE — 99214 OFFICE O/P EST MOD 30 MIN: CPT | Performed by: INTERNAL MEDICINE

## 2020-12-18 PROCEDURE — 3017F COLORECTAL CA SCREEN DOC REV: CPT | Performed by: INTERNAL MEDICINE

## 2020-12-18 PROCEDURE — G8427 DOCREV CUR MEDS BY ELIG CLIN: HCPCS | Performed by: INTERNAL MEDICINE

## 2020-12-18 PROCEDURE — 1123F ACP DISCUSS/DSCN MKR DOCD: CPT | Performed by: INTERNAL MEDICINE

## 2020-12-18 PROCEDURE — G8484 FLU IMMUNIZE NO ADMIN: HCPCS | Performed by: INTERNAL MEDICINE

## 2020-12-18 PROCEDURE — 1036F TOBACCO NON-USER: CPT | Performed by: INTERNAL MEDICINE

## 2020-12-18 PROCEDURE — G8417 CALC BMI ABV UP PARAM F/U: HCPCS | Performed by: INTERNAL MEDICINE

## 2020-12-18 PROCEDURE — G8399 PT W/DXA RESULTS DOCUMENT: HCPCS | Performed by: INTERNAL MEDICINE

## 2020-12-18 PROCEDURE — 4040F PNEUMOC VAC/ADMIN/RCVD: CPT | Performed by: INTERNAL MEDICINE

## 2020-12-18 PROCEDURE — 1090F PRES/ABSN URINE INCON ASSESS: CPT | Performed by: INTERNAL MEDICINE

## 2020-12-18 PROCEDURE — G9899 SCRN MAM PERF RSLTS DOC: HCPCS | Performed by: INTERNAL MEDICINE

## 2020-12-18 RX ORDER — SPIRONOLACTONE 25 MG/1
TABLET ORAL
Qty: 90 TABLET | Refills: 3 | Status: SHIPPED | OUTPATIENT
Start: 2020-12-18 | End: 2021-04-21

## 2020-12-28 NOTE — PROGRESS NOTES
Pt completed labs .
PROAIR RESPICLICK 755 (90 Base) MCG/ACT aerosol powder inhalation Inhale 2 puffs into the lungs every 4 hours as needed for Wheezing or Shortness of Breath Yes Sharyn Easton MD   colchicine (COLCRYS) 0.6 MG tablet Take 1 tablet by mouth 2 times daily Yes Sharyn Easton MD   ipratropium-albuterol (DUONEB) 0.5-2.5 (3) MG/3ML SOLN nebulizer solution Inhale 3 mLs into the lungs every 6 hours Yes Sharyn Easton MD   Naproxen Sodium (ALEVE PO) Take by mouth as needed  Yes Historical Provider, MD       Social History     Tobacco Use    Smoking status: Never Smoker    Smokeless tobacco: Never Used    Tobacco comment: reviewed 8/23/16   Substance Use Topics    Alcohol use: Yes     Alcohol/week: 0.0 standard drinks     Comment: wine 2x month, rarely    Drug use: No          PHYSICAL EXAMINATION:    Constitutional: [x] Appears well-developed and well-nourished [x] No apparent distress      [] Abnormal-   Mental status  [x] Alert and awake  [x] Oriented to person/place/time [x]Able to follow commands             Psychiatric:       [x] Normal Affect [x] No Hallucinations      ASSESSMENT/PLAN:  1. Essential hypertension - reviwed labs, no change  - spironolactone (ALDACTONE) 25 MG tablet; TAKE 1 TABLET BY MOUTH ONCE DAILY  Dispense: 90 tablet; Refill: 3  - Comprehensive Metabolic Panel; Future  - Lipid Panel; Future  - CBC Auto Differential; Future    2. Mixed hyperlipidemia - no change  - Comprehensive Metabolic Panel; Future  - Lipid Panel; Future    3. Hypothyroidism due to acquired atrophy of thyroid - at goal  - TSH without Reflex; Future    4. Torticollis, spasmodic - following at [facility], no change in mgmt    5. Acute idiopathic gout involving toe of left foot - doing well with allopurinol        Return in about 6 months (around 6/18/2021).

## 2021-02-09 RX ORDER — PRAVASTATIN SODIUM 40 MG
TABLET ORAL
Qty: 90 TABLET | Refills: 3 | Status: SHIPPED | OUTPATIENT
Start: 2021-02-09 | End: 2022-06-02

## 2021-04-08 RX ORDER — TIZANIDINE 4 MG/1
4 TABLET ORAL 3 TIMES DAILY PRN
Qty: 90 TABLET | Refills: 5 | Status: SHIPPED | OUTPATIENT
Start: 2021-04-08 | End: 2021-06-02

## 2021-04-20 ENCOUNTER — TELEPHONE (OUTPATIENT)
Dept: INTERNAL MEDICINE CLINIC | Age: 76
End: 2021-04-20

## 2021-04-20 NOTE — TELEPHONE ENCOUNTER
Patient called states last evening she had a headache. Checked her BP-183/114, This morning BP-126/86. Patient states she is currently taking Lisinopril 40mg and Spironolactone 25mg. Patient states she does not have appt until June. Please advise. Thank you!

## 2021-04-21 ENCOUNTER — OFFICE VISIT (OUTPATIENT)
Dept: INTERNAL MEDICINE CLINIC | Age: 76
End: 2021-04-21
Payer: MEDICARE

## 2021-04-21 VITALS
BODY MASS INDEX: 31.82 KG/M2 | SYSTOLIC BLOOD PRESSURE: 142 MMHG | DIASTOLIC BLOOD PRESSURE: 100 MMHG | RESPIRATION RATE: 18 BRPM | WEIGHT: 191.2 LBS | OXYGEN SATURATION: 99 % | HEART RATE: 70 BPM

## 2021-04-21 DIAGNOSIS — I10 ESSENTIAL HYPERTENSION: ICD-10-CM

## 2021-04-21 DIAGNOSIS — E21.3 HYPERPARATHYROIDISM (HCC): ICD-10-CM

## 2021-04-21 PROCEDURE — 1036F TOBACCO NON-USER: CPT | Performed by: INTERNAL MEDICINE

## 2021-04-21 PROCEDURE — 3017F COLORECTAL CA SCREEN DOC REV: CPT | Performed by: INTERNAL MEDICINE

## 2021-04-21 PROCEDURE — G8417 CALC BMI ABV UP PARAM F/U: HCPCS | Performed by: INTERNAL MEDICINE

## 2021-04-21 PROCEDURE — G9899 SCRN MAM PERF RSLTS DOC: HCPCS | Performed by: INTERNAL MEDICINE

## 2021-04-21 PROCEDURE — 99213 OFFICE O/P EST LOW 20 MIN: CPT | Performed by: INTERNAL MEDICINE

## 2021-04-21 PROCEDURE — G8427 DOCREV CUR MEDS BY ELIG CLIN: HCPCS | Performed by: INTERNAL MEDICINE

## 2021-04-21 PROCEDURE — G8399 PT W/DXA RESULTS DOCUMENT: HCPCS | Performed by: INTERNAL MEDICINE

## 2021-04-21 PROCEDURE — 1090F PRES/ABSN URINE INCON ASSESS: CPT | Performed by: INTERNAL MEDICINE

## 2021-04-21 PROCEDURE — 4040F PNEUMOC VAC/ADMIN/RCVD: CPT | Performed by: INTERNAL MEDICINE

## 2021-04-21 PROCEDURE — 1123F ACP DISCUSS/DSCN MKR DOCD: CPT | Performed by: INTERNAL MEDICINE

## 2021-04-21 RX ORDER — SPIRONOLACTONE 50 MG/1
50 TABLET, FILM COATED ORAL DAILY
Qty: 90 TABLET | Refills: 3 | Status: SHIPPED | OUTPATIENT
Start: 2021-04-21 | End: 2022-07-20

## 2021-04-21 ASSESSMENT — PATIENT HEALTH QUESTIONNAIRE - PHQ9
SUM OF ALL RESPONSES TO PHQ9 QUESTIONS 1 & 2: 0
2. FEELING DOWN, DEPRESSED OR HOPELESS: 0
SUM OF ALL RESPONSES TO PHQ QUESTIONS 1-9: 0
SUM OF ALL RESPONSES TO PHQ QUESTIONS 1-9: 0

## 2021-04-21 NOTE — PROGRESS NOTES
Sharad Johnston Profitt  1945 04/21/21    SUBJECTIVE:    The patient is taking hypertensive medications compliantly without side effects. BP however was elevate over the past few days. Denies chest pain, dyspnea, edema, or TIA's. She had a headache yesterday but resolved today. She will see endo for the hyperPTH. OBJECTIVE:    BP (!) 142/100   Pulse 70   Resp 18   Wt 191 lb 3.2 oz (86.7 kg)   SpO2 99%   BMI 31.82 kg/m²     Physical Exam    ASSESSMENT:    1. Essential hypertension    2. Hyperparathyroidism [facility])        PLAN:    Nilsa Spear was seen today for hypertension. Diagnoses and all orders for this visit:    Essential hypertension - will increase the aldactone, check BP resting intermittently and send results to me 1-2 weeks  -     spironolactone (ALDACTONE) 50 MG tablet;  Take 1 tablet by mouth daily    Hyperparathyroidism ([address]- await endo eval

## 2021-06-08 ENCOUNTER — HOSPITAL ENCOUNTER (OUTPATIENT)
Dept: WOMENS IMAGING | Age: 76
Discharge: HOME | End: 2021-06-08
Payer: MEDICARE

## 2021-06-08 DIAGNOSIS — E55.9 VITAMIN D INSUFFICIENCY: ICD-10-CM

## 2021-06-08 DIAGNOSIS — E21.0 PRIMARY HYPERPARATHYROIDISM (HCC): ICD-10-CM

## 2021-06-08 PROCEDURE — 77080 DXA BONE DENSITY AXIAL: CPT

## 2021-06-16 DIAGNOSIS — E03.4 HYPOTHYROIDISM DUE TO ACQUIRED ATROPHY OF THYROID: Primary | ICD-10-CM

## 2021-06-16 RX ORDER — LEVOTHYROXINE SODIUM 0.15 MG/1
150 TABLET ORAL DAILY
Qty: 90 TABLET | Refills: 1 | Status: SHIPPED | OUTPATIENT
Start: 2021-06-16 | End: 2022-01-17

## 2021-06-18 ENCOUNTER — OFFICE VISIT (OUTPATIENT)
Dept: INTERNAL MEDICINE CLINIC | Age: 76
End: 2021-06-18
Payer: MEDICARE

## 2021-06-18 VITALS
DIASTOLIC BLOOD PRESSURE: 78 MMHG | WEIGHT: 188.4 LBS | OXYGEN SATURATION: 96 % | SYSTOLIC BLOOD PRESSURE: 118 MMHG | RESPIRATION RATE: 18 BRPM | HEART RATE: 72 BPM | BODY MASS INDEX: 35.6 KG/M2

## 2021-06-18 DIAGNOSIS — E21.3 HYPERPARATHYROIDISM (HCC): ICD-10-CM

## 2021-06-18 DIAGNOSIS — E03.4 HYPOTHYROIDISM DUE TO ACQUIRED ATROPHY OF THYROID: Primary | ICD-10-CM

## 2021-06-18 DIAGNOSIS — J45.991 COUGH VARIANT ASTHMA: ICD-10-CM

## 2021-06-18 DIAGNOSIS — E78.2 MIXED HYPERLIPIDEMIA: ICD-10-CM

## 2021-06-18 DIAGNOSIS — I10 ESSENTIAL HYPERTENSION: ICD-10-CM

## 2021-06-18 PROCEDURE — G8427 DOCREV CUR MEDS BY ELIG CLIN: HCPCS | Performed by: INTERNAL MEDICINE

## 2021-06-18 PROCEDURE — G9899 SCRN MAM PERF RSLTS DOC: HCPCS | Performed by: INTERNAL MEDICINE

## 2021-06-18 PROCEDURE — G8417 CALC BMI ABV UP PARAM F/U: HCPCS | Performed by: INTERNAL MEDICINE

## 2021-06-18 PROCEDURE — G8399 PT W/DXA RESULTS DOCUMENT: HCPCS | Performed by: INTERNAL MEDICINE

## 2021-06-18 PROCEDURE — 3017F COLORECTAL CA SCREEN DOC REV: CPT | Performed by: INTERNAL MEDICINE

## 2021-06-18 PROCEDURE — 1090F PRES/ABSN URINE INCON ASSESS: CPT | Performed by: INTERNAL MEDICINE

## 2021-06-18 PROCEDURE — 99214 OFFICE O/P EST MOD 30 MIN: CPT | Performed by: INTERNAL MEDICINE

## 2021-06-18 PROCEDURE — 4040F PNEUMOC VAC/ADMIN/RCVD: CPT | Performed by: INTERNAL MEDICINE

## 2021-06-18 PROCEDURE — 1036F TOBACCO NON-USER: CPT | Performed by: INTERNAL MEDICINE

## 2021-06-18 PROCEDURE — 1123F ACP DISCUSS/DSCN MKR DOCD: CPT | Performed by: INTERNAL MEDICINE

## 2021-06-18 RX ORDER — ACETAMINOPHEN 160 MG
4000 TABLET,DISINTEGRATING ORAL DAILY
Qty: 60 CAPSULE | Refills: 5 | Status: SHIPPED
Start: 2021-06-18

## 2021-06-18 NOTE — PROGRESS NOTES
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 Pt was seen by miriam for hyperparathyroidism. Her vit d was increased. Pt continues on synthroid for hypothyroidism. She has had no pisodes of gout. The patient is taking hypertensive medications compliantly without side effects. Denies chest pain, dyspnea, edema, or TIA's. Patient denies any chest pain, shortness of breath, myalgias, Patient is tolerating cholesterol medications without difficulty. She has not been consistent with the symbicort. OBJECTIVE:    /78   Pulse 72   Resp 18   Wt 188 lb 6.4 oz (85.5 kg)   SpO2 96%   BMI 35.60 kg/m²     Physical Exam  Constitutional:       Appearance: She is well-developed. Eyes:      General: No scleral icterus. Conjunctiva/sclera: Conjunctivae normal.   Neck:      Thyroid: No thyromegaly. Trachea: No tracheal deviation. Cardiovascular:      Rate and Rhythm: Normal rate and regular rhythm. Heart sounds: No murmur heard. No friction rub. No gallop. Pulmonary:      Effort: No respiratory distress. Breath sounds: No wheezing or rales. Abdominal:      General: Bowel sounds are normal. There is no distension. Palpations: Abdomen is soft. There is no hepatomegaly or mass. Tenderness: There is no abdominal tenderness. There is no guarding or rebound. Musculoskeletal:      Cervical back: Neck supple. Lymphadenopathy:      Cervical: No cervical adenopathy. Skin:     Nails: There is no clubbing. Neurological:      Mental Status: She is alert and oriented to person, place, and time. Psychiatric:         Behavior: Behavior normal.         Judgment: Judgment normal.         ASSESSMENT:    1. Hypothyroidism due to acquired atrophy of thyroid    2. Essential hypertension    3. Hyperpar[address]roidism [address] [address]    4. Mixed hyperlipidemia    5.  Cough variant asthma        PLAN: Tyrel Lloyd was seen today for 6 month follow-up. Diagnoses and all orders for this visit:    Hypothyroidism due to acquired atrophy of thyroid - dose changes, check labs 6 weeks  -     TSH without Reflex; Future  -     TSH without Reflex; Future    Essential hypertension - at goal, no change  -     Comprehensive Metabolic Panel; Future  -     Lipid Panel; Future  -     CBC Auto Differential; Future    Hyperparathyroidism ([facility] [address] - following with endo; no Tx needed  -     Comprehensive Metabolic Panel; Future    Mixed hyperlipidemia - check labs, cont statin  -     Comprehensive Metabolic Panel; Future  -     Lipid Panel; Future    Cough variant asthma - has had more coughing; encourage inhalers    Other orders  -     Cholecalciferol (VITAMIN D3) 50 MCG (2000 UT) CAPS;  Take 2 capsules by mouth daily

## 2021-07-01 DIAGNOSIS — M10.072 ACUTE IDIOPATHIC GOUT INVOLVING TOE OF LEFT FOOT: ICD-10-CM

## 2021-07-02 RX ORDER — ALLOPURINOL 100 MG/1
TABLET ORAL
Qty: 90 TABLET | Refills: 3 | Status: SHIPPED | OUTPATIENT
Start: 2021-07-02

## 2021-07-02 RX ORDER — LISINOPRIL 40 MG/1
TABLET ORAL
Qty: 90 TABLET | Refills: 3 | Status: SHIPPED | OUTPATIENT
Start: 2021-07-02 | End: 2022-08-31

## 2021-07-12 PROBLEM — Z98.890 STATUS POST LEFT BREAST LUMPECTOMY: Status: ACTIVE | Noted: 2021-07-12

## 2021-09-07 RX ORDER — METOPROLOL SUCCINATE 50 MG/1
TABLET, EXTENDED RELEASE ORAL
Qty: 90 TABLET | Refills: 3 | Status: SHIPPED | OUTPATIENT
Start: 2021-09-07 | End: 2022-09-16

## 2021-10-25 DIAGNOSIS — R05.9 COUGH: ICD-10-CM

## 2021-10-25 RX ORDER — IPRATROPIUM BROMIDE AND ALBUTEROL SULFATE 2.5; .5 MG/3ML; MG/3ML
1 SOLUTION RESPIRATORY (INHALATION) EVERY 6 HOURS
Qty: 120 EACH | Refills: 3 | Status: SHIPPED | OUTPATIENT
Start: 2021-10-25

## 2021-11-03 DIAGNOSIS — J45.991 COUGH VARIANT ASTHMA: ICD-10-CM

## 2021-11-03 RX ORDER — BUDESONIDE AND FORMOTEROL FUMARATE DIHYDRATE 160; 4.5 UG/1; UG/1
2 AEROSOL RESPIRATORY (INHALATION) EVERY 12 HOURS
Qty: 3 EACH | Refills: 3 | Status: SHIPPED | OUTPATIENT
Start: 2021-11-03

## 2021-11-03 NOTE — TELEPHONE ENCOUNTER
----- Message from Daniel Vinson sent at 11/3/2021 12:55 PM EDT -----  Subject: Refill Request    QUESTIONS  Name of Medication? budesonide-formoterol (SYMBICORT) 160-4.5 MCG/ACT AERO  Patient-reported dosage and instructions? 160-4.5 MCG/ACT AERO, Inhale 2   puffs into the lungs every 12 hours After inhalation then gargle  How many days do you have left? 0  Preferred Pharmacy? [id number]  Pharmacy phone number (if available)? [phone]  ---------------------------------------------------------------------------  --------------  CALL BACK INFO  What is the best way for the office to contact you? OK to leave message on   voicemail  Preferred Call Back Phone Number?  [phone]

## 2021-12-13 ENCOUNTER — OFFICE VISIT (OUTPATIENT)
Dept: INTERNAL MEDICINE CLINIC | Age: 76
End: 2021-12-13
Payer: MEDICARE

## 2021-12-13 VITALS
RESPIRATION RATE: 16 BRPM | DIASTOLIC BLOOD PRESSURE: 74 MMHG | SYSTOLIC BLOOD PRESSURE: 136 MMHG | WEIGHT: 190 LBS | BODY MASS INDEX: 35.9 KG/M2 | OXYGEN SATURATION: 97 % | HEART RATE: 74 BPM

## 2021-12-13 DIAGNOSIS — E21.3 HYPERPARATHYROIDISM (HCC): ICD-10-CM

## 2021-12-13 DIAGNOSIS — E78.2 MIXED HYPERLIPIDEMIA: ICD-10-CM

## 2021-12-13 DIAGNOSIS — I10 ESSENTIAL HYPERTENSION: Primary | ICD-10-CM

## 2021-12-13 DIAGNOSIS — E03.4 HYPOTHYROIDISM DUE TO ACQUIRED ATROPHY OF THYROID: ICD-10-CM

## 2021-12-13 DIAGNOSIS — J45.991 COUGH VARIANT ASTHMA: ICD-10-CM

## 2021-12-13 DIAGNOSIS — M10.072 ACUTE IDIOPATHIC GOUT INVOLVING TOE OF LEFT FOOT: ICD-10-CM

## 2021-12-13 PROCEDURE — 1123F ACP DISCUSS/DSCN MKR DOCD: CPT | Performed by: INTERNAL MEDICINE

## 2021-12-13 PROCEDURE — 3017F COLORECTAL CA SCREEN DOC REV: CPT | Performed by: INTERNAL MEDICINE

## 2021-12-13 PROCEDURE — G8417 CALC BMI ABV UP PARAM F/U: HCPCS | Performed by: INTERNAL MEDICINE

## 2021-12-13 PROCEDURE — 1090F PRES/ABSN URINE INCON ASSESS: CPT | Performed by: INTERNAL MEDICINE

## 2021-12-13 PROCEDURE — G0008 ADMIN INFLUENZA VIRUS VAC: HCPCS | Performed by: INTERNAL MEDICINE

## 2021-12-13 PROCEDURE — 4040F PNEUMOC VAC/ADMIN/RCVD: CPT | Performed by: INTERNAL MEDICINE

## 2021-12-13 PROCEDURE — G8484 FLU IMMUNIZE NO ADMIN: HCPCS | Performed by: INTERNAL MEDICINE

## 2021-12-13 PROCEDURE — 1036F TOBACCO NON-USER: CPT | Performed by: INTERNAL MEDICINE

## 2021-12-13 PROCEDURE — 90694 VACC AIIV4 NO PRSRV 0.5ML IM: CPT | Performed by: INTERNAL MEDICINE

## 2021-12-13 PROCEDURE — G8427 DOCREV CUR MEDS BY ELIG CLIN: HCPCS | Performed by: INTERNAL MEDICINE

## 2021-12-13 PROCEDURE — 99214 OFFICE O/P EST MOD 30 MIN: CPT | Performed by: INTERNAL MEDICINE

## 2021-12-13 PROCEDURE — G8399 PT W/DXA RESULTS DOCUMENT: HCPCS | Performed by: INTERNAL MEDICINE

## 2021-12-13 ASSESSMENT — PATIENT HEALTH QUESTIONNAIRE - PHQ9
1. LITTLE INTEREST OR PLEASURE IN DOING THINGS: 0
SUM OF ALL RESPONSES TO PHQ QUESTIONS 1-9: 0
SUM OF ALL RESPONSES TO PHQ QUESTIONS 1-9: 0
2. FEELING DOWN, DEPRESSED OR HOPELESS: 0
SUM OF ALL RESPONSES TO PHQ QUESTIONS 1-9: 0
SUM OF ALL RESPONSES TO PHQ9 QUESTIONS 1 & 2: 0

## 2021-12-13 NOTE — PROGRESS NOTES
Yuval [facility]  1945 12/13/21    SUBJECTIVE:    Pt continues to struggle with torticullis. She continues to have botox injections but these provide modest benefit. She no longer drives because head rotation is limited. Pt continues on synthroid for hypothyroidism     Patient denies any chest pain, shortness of breath, myalgias, Patient is tolerating cholesterol medications without difficulty. Asthma is doing well with intermittent use of symbicort. The patient is taking hypertensive medications compliantly without side effects. Denies chest pain, dyspnea, edema, or TIA's. She has had no episodes of gout. OBJECTIVE:    /74   Pulse 74   Resp 16   Wt 190 lb (86.2 kg)   SpO2 97%   BMI 35.90 kg/m²     Physical Exam  Constitutional:       Appearance: She is well-developed. Eyes:      General: No scleral icterus. Conjunctiva/sclera: Conjunctivae normal.   Neck:      Thyroid: No thyromegaly. Trachea: No tracheal deviation. Cardiovascular:      Rate and Rhythm: Normal rate and regular rhythm. Heart sounds: No murmur heard. No friction rub. No gallop. Pulmonary:      Effort: No respiratory distress. Breath sounds: No wheezing or rales. Abdominal:      General: Bowel sounds are normal. There is no distension. Palpations: Abdomen is soft. There is no hepatomegaly or mass. Tenderness: There is no abdominal tenderness. There is no guarding or rebound. Musculoskeletal:      Cervical back: Neck supple. Lymphadenopathy:      Cervical: No cervical adenopathy. Skin:     Nails: There is no clubbing. Neurological:      Mental Status: She is alert and oriented to person, place, and time. Psychiatric:         Behavior: Behavior normal.         Judgment: Judgment normal.         ASSESSMENT:    1. Essential hypertension    2. Cough variant asthma    3. Acute idiopathic gout involving toe of left foot    4.  Hypothyroidism due to acquired atrophy of thyroid    5. Mixed hyperlipidemia    6. Hyperparathyroidism [facility])        PLAN:    Jessica Banuelos was seen today for hypothyroidism. Diagnoses and all orders for this visit:    Essential hypertension - at goal; reviewed labs  -     Comprehensive Metabolic Panel; Future  -     Lipid Panel; Future  -     CBC Auto Differential; Future    Cough variant asthma - cont with PRN inhalers    Acute idiopathic gout involving toe of left foot - no current symptoms, cont allopurinol    Hypothyroidism due to acquired atrophy of thyroid - at goal  -     TSH without Reflex; Future    Mixed hyperlipidemia - check labs  -     Comprehensive Metabolic Panel; Future  -     Lipid Panel; Future    Hyperparathyroidism ([facility] [address] - calcium slightly elevated, cont to monitor  -     PTH, INTACT; Future    Other orders  -     INFLUENZA, HIGH-DOSE, QUADV, 65 YRS +, IM, PF, 0.7ML (FLUZONE)      Discussed mild increased in lymphocytes.  WIll recheck in 6 months, may need to refer to heme

## 2022-01-03 ENCOUNTER — OFFICE VISIT (OUTPATIENT)
Dept: INTERNAL MEDICINE CLINIC | Age: 77
End: 2022-01-03
Payer: MEDICARE

## 2022-01-03 VITALS
SYSTOLIC BLOOD PRESSURE: 112 MMHG | HEART RATE: 66 BPM | OXYGEN SATURATION: 99 % | DIASTOLIC BLOOD PRESSURE: 74 MMHG | RESPIRATION RATE: 14 BRPM

## 2022-01-03 DIAGNOSIS — H81.11 BENIGN PAROXYSMAL POSITIONAL VERTIGO OF RIGHT EAR: Primary | ICD-10-CM

## 2022-01-03 PROCEDURE — 4040F PNEUMOC VAC/ADMIN/RCVD: CPT | Performed by: INTERNAL MEDICINE

## 2022-01-03 PROCEDURE — 1036F TOBACCO NON-USER: CPT | Performed by: INTERNAL MEDICINE

## 2022-01-03 PROCEDURE — 99214 OFFICE O/P EST MOD 30 MIN: CPT | Performed by: INTERNAL MEDICINE

## 2022-01-03 PROCEDURE — G8417 CALC BMI ABV UP PARAM F/U: HCPCS | Performed by: INTERNAL MEDICINE

## 2022-01-03 PROCEDURE — 1123F ACP DISCUSS/DSCN MKR DOCD: CPT | Performed by: INTERNAL MEDICINE

## 2022-01-03 PROCEDURE — G8427 DOCREV CUR MEDS BY ELIG CLIN: HCPCS | Performed by: INTERNAL MEDICINE

## 2022-01-03 PROCEDURE — G8484 FLU IMMUNIZE NO ADMIN: HCPCS | Performed by: INTERNAL MEDICINE

## 2022-01-03 PROCEDURE — G8399 PT W/DXA RESULTS DOCUMENT: HCPCS | Performed by: INTERNAL MEDICINE

## 2022-01-03 PROCEDURE — 3017F COLORECTAL CA SCREEN DOC REV: CPT | Performed by: INTERNAL MEDICINE

## 2022-01-03 PROCEDURE — 1090F PRES/ABSN URINE INCON ASSESS: CPT | Performed by: INTERNAL MEDICINE

## 2022-01-03 RX ORDER — IPRATROPIUM BROMIDE 21 UG/1
2 SPRAY, METERED NASAL EVERY 12 HOURS
Qty: 30 ML | Refills: 5 | Status: SHIPPED | OUTPATIENT
Start: 2022-01-03

## 2022-01-03 NOTE — PROGRESS NOTES
Adele Brooks Profitt  1945 01/03/22    SUBJECTIVE:    2-3 days ago pt moved her head and she suddenly felt vertigo. This resolved and the rest of the day she had no difficulties. This morning she rolled to the left and sat up, and she again had vertigo. This resolved, but she has had persistent disequilibrium and she feels if she bends over the symptoms will recur. She denies slurred speech, facial droop, N/W, HA, N/V.     OBJECTIVE:    /74   Pulse 66   Resp 14   SpO2 99%     Physical Exam  Constitutional:       Appearance: She is well-developed. Eyes:      General: No scleral icterus. Conjunctiva/sclera: Conjunctivae normal.   Cardiovascular:      Rate and Rhythm: Normal rate and regular rhythm. Heart sounds: Normal heart sounds. No murmur heard. Pulmonary:      Effort: Pulmonary effort is normal. No respiratory distress. Breath sounds: Normal breath sounds. No wheezing. Neurological:      Cranial Nerves: Cranial nerves are intact. Sensory: Sensation is intact. Motor: Motor function is intact. Coordination: Romberg sign negative. Finger-Nose-Finger Test normal.      Comments: (+) zeferino hallpike maneuver to the right         ASSESSMENT:    1. Benign paroxysmal positional vertigo of right ear        PLAN:    Celia was seen today for dizziness. Diagnoses and all orders for this visit:    Benign paroxysmal positional vertigo of right ear - most c/w BPPV.  No suggestion of CVA, mass, etc. Will refer for PT  -     [facility]    Other orders  -     ipratropium (ATROVENT) 0.03 % nasal spray; 2 sprays by Each Nostril route every 12 hours

## 2022-01-14 ENCOUNTER — HOSPITAL ENCOUNTER (OUTPATIENT)
Dept: PHYSICAL THERAPY | Age: 77
Setting detail: THERAPIES SERIES
Discharge: HOME | End: 2022-01-14
Payer: MEDICARE

## 2022-01-14 PROCEDURE — 97162 PT EVAL MOD COMPLEX 30 MIN: CPT

## 2022-01-14 PROCEDURE — 97161 PT EVAL LOW COMPLEX 20 MIN: CPT

## 2022-01-14 PROCEDURE — 97112 NEUROMUSCULAR REEDUCATION: CPT

## 2022-01-14 NOTE — FLOWSHEET NOTE
Outpatient Physical Therapy  [place]           [x] Phone: [phone]   Fax: [phone]  [place]           [] Phone: [phone]   Fax: [phone]        Physical Therapy Daily Treatment Note  Date:  2022    Patient Name:  Deepti Vásquez    :  1945  MRN: [id number]  Restrictions/Precautions:  none  Diagnosis:   Diagnosis: Benign paroxysmal positional vertigo R ear  Date of Injury/Surgery:   Treatment Diagnosis: Treatment Diagnosis: impaired balance    Insurance/Certification information: PT Insurance Information: Medicare   Referring Physician:  Referring Practitioner: Nell Duron MD  Next Doctor Visit:    Plan of care signed (Y/N):  Sent   Outcome Measure: DGI: 15/24. DHI: 22  Visit# / total visits:     Pain level: 0/10     Goals:     Patient goals : reduce dizziness and off feelings  Short term goals  Time Frame for Short term goals: Refer to [facility]  Long term goals  Time Frame for Long term goals : 8 visits  Long term goal 1: Pt will improve DHI to 15 or less to show subjective improvement  Long term goal 2: Pt will improve DGI to 20 or more to show improved balance and reduced fall risk  Long term goal 3: Pt will report overall improvement of condition by 50% or more  Long term goal 4: Pt will improve romberg EC on foam to 30 seconds min sway    Summary of Evaluation: Assessment: Pt is a 44-year-old female who presents to therapy with new onset of dizziness and some LOB starting ~2-3 weeks ago. Upon assessment, pt was negative for BL posterior and horizontal SCC BPPV. Pt did present with mild VOR impairment and moderate VSR balance impairment indicating deconditioned vestibular system, most likely R>L. Pt would benefit from skilled therapy interventions to address listed impairments, progress toward goal completion and improve ADL/IADL status. PT also warranted to reduce risk for further injury or decline.         Subjective:  See eval       Any changes in Ambulatory Summary Sheet? None      Objective:  See eval   COVID screening questions were asked and patient attested that there had been no contact or symptoms      Exercises: (No more than 4 columns)   Exercise/Equipment 1/14/22 #1 Date Date           WARM UP         vorx1 Horizontal and vertical                            PROPRIOCEPTION      Static standing Foam: romberg EO/EC, WBOS EO/EC                             MODALITIES                      Other Therapeutic Activities/Education:  HEP and importance of completion, POC and goals, anatomy and physiology related to condition      Home Exercise Program: Issued, practiced and pt demo ability to perform 1/14/2022      Manual Treatments:  none      Modalities:  none      Communication with other providers:  POC sent      Assessment:  Pt tolerated today's treatment without any adverse reactions or complications this date. End pain: same    Assessment: Pt is a 69-year-old female who presents to therapy with new onset of dizziness and some LOB starting ~2-3 weeks ago. Upon assessment, pt was negative for BL posterior and horizontal SCC BPPV. Pt did present with mild VOR impairment and moderate VSR balance impairment indicating deconditioned vestibular system, most likely R>L. Pt would benefit from skilled therapy interventions to address listed impairments, progress toward goal completion and improve ADL/IADL status. PT also warranted to reduce risk for further injury or decline.       Plan for Next Session: Specific instructions for Next Treatment: vestibular therapy    Time In / Time Out:  8093 - 1427      Timed Code/Total Treatment Minutes:  47': 8' NMR x1, 46' Eval x1    Next Progress Note due:  10th visit    Plan of Care Interventions:  [x] Therapeutic Exercise  [] Modalities:  [x] Therapeutic Activity     [] Ultrasound  [] Estim  [x] Gait Training      [] Cervical Traction [] Lumbar Traction  [x] Neuromuscular Re-education    [] Cold/hotpack [] Iontophoresis   [x] Instruction in HEP      [] Vasopneumatic   [] Dry Needling    [x] Manual Therapy               [] Aquatic Therapy              Electronically signed by:  Michael Burk PT, DPT 1/14/2022, 3:07 PM

## 2022-01-14 NOTE — PROGRESS NOTES
Physical Therapy  Initial Assessment  Date: 2022  Patient Name: Dandre Molina  MRN: [id number]  : 1945     Treatment Diagnosis: impaired balance    Restrictions: none       Subjective   General  Chart Reviewed: Yes  Patient assessed for rehabilitation services?: Yes  Additional Pertinent Hx: HTN, Asthma, spasmatic torticollis  Referring Practitioner: Luisito Buchanan MD  Referral Date : 22  Diagnosis: Benign paroxysmal positional vertigo R ear  Follows Commands: Within Functional Limits  PT Visit Information  PT Insurance Information: Medicare  Subjective  Subjective: Pt notes that her first bout of dizziness started ~2-3 weeks ago when the room would spin when she would lay down. She also notes that when she would first get up, she would have to sit on the edge of the bed also to prevent from getting dizzy. She also notes that initially when she rolled to the R she had dizziness and room spinning but sometimes she feels it on both sides now. Spasmatic torticollis making it difficult to turn to the L (she does get Botox injections). Pt denies any illness/infection, she did hit her head on the door frame of her car a few weeks ago pretty hard, denies migraines/headaches or tinnitus. She notes that other than transitioning in bed or rolling she does not have the room spinning.   Pain Screening  Patient Currently in Pain: No  Vital Signs  Patient Currently in Pain: No    Vision/Hearing  Vision  Vision: Within Functional Limits (glasses)  Hearing  Hearing: Within functional limits    Orientation  Orientation  Overall Orientation Status: Within Normal Limits    Social/Functional History  Social/Functional History  Lives With: Spouse  ADL Assistance: Independent  Homemaking Assistance: Independent  Ambulation Assistance: Independent  Transfer Assistance: Independent    Objective     Observation/Palpation  Observation: Gait: normal linear gait w/ no AD       Additional Measures  Special Tests: DGI: 15/24. DHI: 22  Other: foam: romberg EO min sway 30 sec. romberg EC 15 sec mod sway           Subjective  Patient experiences spells of vertigo?: Yes  Describe Vertigo: Room Spinning  How long do these spells last?: Seconds  Vertigo Is: Induced by position; Induced by motion  Describe inducing motions/positions: rolling in bed  Patient experiences disequilibrium?: Yes  Disequilibrium is?: Induced by motion; Induced by position changes  Symptoms worse with: Self motion  Associated hearing/ear symptoms: No  Any recent Illness or Infections?: No  Any recent accidents or head trauma?: Yes  Any history of migraines or headaches?: No  Oculomotor Examination  Oculomotor Examination: Yes  Spontaneous Nystagmus: No  Gaze Holding Nystagmus: No  Eye Movement Range: Conjugate  Vergence: Abnormal (diplopia or disconjugate at > 10 cm (11 cm)  Smooth Pursuits: WNL  Saccades: WNL (2 or less)  VOR (Cancellation): WNL  VOR (slow): WNL  Head Impulse Test/ Head Thrust Test: Positive to the right  Positional Testing  Vestebral artery test in sitting position: Negative  Right [place] Hallpike: Negative  Left [place] Hallpike: Negative  Right Roll Test: Negative  Left Roll Test: Negative         Assessment   Conditions Requiring Skilled Therapeutic Intervention  Body structures, Functions, Activity limitations: Decreased functional mobility ; Decreased high-level IADLs; Vestibular Impairment;Decreased balance  Assessment: Pt is a 62-year-old female who presents to therapy with new onset of dizziness and some LOB starting ~2-3 weeks ago. Upon assessment, pt was negative for BL posterior and horizontal SCC BPPV. Pt did present with mild VOR impairment and moderate VSR balance impairment indicating deconditioned vestibular system, most likely R>L. Pt would benefit from skilled therapy interventions to address listed impairments, progress toward goal completion and improve ADL/IADL status.  PT also warranted to reduce risk for further injury or decline. Treatment Diagnosis: impaired balance  Prognosis: Good  Decision Making: Low Complexity  History: see above. PLOF: independent  Exam: see above  Clinical Presentation: see above  Barriers to Learning: none. style demo  REQUIRES PT FOLLOW UP: Yes  Treatment Initiated : yes on 1/14    Patient agrees with established plan of care and assisted in the development of their short term and long term goals. Patient had no adverse reaction with initial treatment and there are no barriers to learning. Learning preferences include demonstration, practice, and handouts. Patient expressed understanding of HEP and appears to be motivated to participate in an active PT program including compliance with HEP expectations.           Plan   Plan  Times per week: 1  Times per day: Daily  Plan weeks: 8  Specific instructions for Next Treatment: vestibular therapy  Current Treatment Recommendations: IADL Training,Strengthening,Neuromuscular Re-education,Home Exercise Program,ROM,Manual Therapy - Soft Tissue Mobilization,Safety Education & Training,Balance Training,Patient/Caregiver Education & Latanya Turner Mobility Training,ADL/Self-care Training,Pain Management,Gait Training,Modalities      OutComes Score   see above    Goals  Short term goals  Time Frame for Short term goals: Refer to [facility]  Long term goals  Time Frame for Long term goals : 8 visits  Long term goal 1: Pt will improve DHI to 15 or less to show subjective improvement  Long term goal 2: Pt will improve DGI to 20 or more to show improved balance and reduced fall risk  Long term goal 3: Pt will report overall improvement of condition by 50% or more  Long term goal 4: Pt will improve romberg EC on foam to 30 seconds min sway  Patient Goals   Patient goals : reduce dizziness and off feelings       Therapy Time: 0973 - 8266    Jairon Durham, PT, DPT

## 2022-01-14 NOTE — PLAN OF CARE
Outpatient Physical Therapy           [place]           [x] Phone: [phone]   Fax: [phone]  [facility]           [] Phone: [phone]   Fax: [phone]     To: Referring Practitioner: Dion Gutierrez MD  From: Argenis Torres, PT, DPT     Patient: Svetlana Lincoln       : 1945  Diagnosis: Diagnosis: Benign paroxysmal positional vertigo R ear   Treatment Diagnosis: Treatment Diagnosis: impaired balance   Date: 2022    Physical Therapy Certification/Re-Certification Form  Dear Dr. Yenny Escobar,  The following patient has been evaluated for physical therapy services and for therapy to continue, insurance requires physician review of the treatment plan initially and every 90 days. Please review the attached evaluation and/or summary of the patient's plan of care, and verify that you agree therapy should continue by signing the attached document and sending it back to our office. Assessment:    Assessment: Pt is a 78-year-old female who presents to therapy with new onset of dizziness and some LOB starting ~2-3 weeks ago. Upon assessment, pt was negative for BL posterior and horizontal SCC BPPV. Pt did present with mild VOR impairment and moderate VSR balance impairment indicating deconditioned vestibular system, most likely R>L. Pt would benefit from skilled therapy interventions to address listed impairments, progress toward goal completion and improve ADL/IADL status. PT also warranted to reduce risk for further injury or decline. Patient agrees with established plan of care and assisted in the development of their short term and long term goals. Patient had no adverse reaction with initial treatment and there are no barriers to learning. Learning preferences include demonstration, practice, and handouts. Patient expressed understanding of HEP and appears to be motivated to participate in an active PT program including compliance with HEP expectations.       Plan of Care/Treatment to date:  [x] Therapeutic Exercise  [] Modalities:  [x] Therapeutic Activity     [] Ultrasound  [] Electrical Stimulation  [x] Gait Training      [] Cervical Traction [] Lumbar Traction  [x] Neuromuscular Re-education    [] Cold/hotpack [] Iontophoresis   [x] Instruction in HEP      [] Vasopneumatic    [] Dry Needling  [x] Manual Therapy               [] Aquatic Therapy       Other:          Frequency/Duration:  # Days per week: [x] 1 day # Weeks: [] 1 week [] 5 weeks     [] 2 days   [] 2 weeks [] 6 weeks     [] 3 days   [] 3 weeks [] 7 weeks     [] 4 days   [] 4 weeks [x] 8 weeks         [] 9 weeks [] 10 weeks         [] 11 weeks [] 12 weeks    Rehab Potential/Progress: [] Excellent [x] Good [] Fair  [] Poor     Goals:    Patient goals : reduce dizziness and off feelings  Short term goals  Time Frame for Short term goals: Refer to [facility]  Long term goals  Time Frame for Long term goals : 8 visits  Long term goal 1: Pt will improve DHI to 15 or less to show subjective improvement  Long term goal 2: Pt will improve DGI to 20 or more to show improved balance and reduced fall risk  Long term goal 3: Pt will report overall improvement of condition by 50% or more  Long term goal 4: Pt will improve romberg EC on foam to 30 seconds min sway      Electronically signed by:  Devin Webster PT, DPT, 1/14/2022, 3:06 PM        If you have any questions or concerns, please don't hesitate to call.   Thank you for your referral.      Physician Signature:________________________________Date:_________ TIME: _____  By signing above, therapists plan is approved by physician

## 2022-01-15 DIAGNOSIS — E03.4 HYPOTHYROIDISM DUE TO ACQUIRED ATROPHY OF THYROID: ICD-10-CM

## 2022-01-17 RX ORDER — LEVOTHYROXINE SODIUM 0.15 MG/1
TABLET ORAL
Qty: 90 TABLET | Refills: 1 | Status: SHIPPED | OUTPATIENT
Start: 2022-01-17 | End: 2022-08-15

## 2022-01-18 ENCOUNTER — HOSPITAL ENCOUNTER (OUTPATIENT)
Dept: PHYSICAL THERAPY | Age: 77
Setting detail: THERAPIES SERIES
Discharge: HOME | End: 2022-01-18
Payer: MEDICARE

## 2022-01-18 PROCEDURE — 97112 NEUROMUSCULAR REEDUCATION: CPT

## 2022-01-18 NOTE — FLOWSHEET NOTE
Outpatient Physical Therapy  [place]           [x] Phone: [phone]   Fax: [phone]  Supriya johnson           [] Phone: [phone]   Fax: [phone]        Physical Therapy Daily Treatment Note  Date:  2022    Patient Name:  Dandre Molina    :  1945  MRN: [id number]  Restrictions/Precautions:  none  Diagnosis:   Diagnosis: Benign paroxysmal positional vertigo R ear  Date of Injury/Surgery:   Treatment Diagnosis: Treatment Diagnosis: impaired balance    Insurance/Certification information: PT Insurance Information: Medicare   Referring Physician:  Referring Practitioner: Luisito Bcuhanan MD  Next Doctor Visit:    Plan of care signed (Y/N):  Y  Outcome Measure: DGI: 15/24. DHI: 22  Visit# / total visits:     Pain level: 0/10     Goals:     Patient goals : reduce dizziness and off feelings  Short term goals  Time Frame for Short term goals: Refer to [facility]  Long term goals  Time Frame for Long term goals : 8 visits  Long term goal 1: Pt will improve DHI to 15 or less to show subjective improvement  Long term goal 2: Pt will improve DGI to 20 or more to show improved balance and reduced fall risk  Long term goal 3: Pt will report overall improvement of condition by 50% or more  Long term goal 4: Pt will improve romberg EC on foam to 30 seconds min sway    Summary of Evaluation: Assessment: Pt is a 28-year-old female who presents to therapy with new onset of dizziness and some LOB starting ~2-3 weeks ago. Upon assessment, pt was negative for BL posterior and horizontal SCC BPPV. Pt did present with mild VOR impairment and moderate VSR balance impairment indicating deconditioned vestibular system, most likely R>L. Pt would benefit from skilled therapy interventions to address listed impairments, progress toward goal completion and improve ADL/IADL status. PT also warranted to reduce risk for further injury or decline. Subjective:  Pt is dong well this date.  She is having some pain with VOR due to her torticollis but can tolerate. Any changes in Ambulatory Summary Sheet? None      Objective:    COVID screening questions were asked and patient attested that there had been no contact or symptoms    No LOB or sway on AP rocker board EO/EC    Exercises: (No more than 4 columns)   Exercise/Equipment 1/14/22 #1 1/18/22 #2 Date           WARM UP         vorx1 Horizontal and vertical Review for HEP and show alternate ways to reduce head movement                           PROPRIOCEPTION      Static standing Foam: romberg EO/EC, WBOS EO/EC Foam: romberg EO/EC, WBOS EO/EC, SLS EO        Foam strip  Sidestep  tandem    Marching  EO/EC foam    Cone taps  Foam     Dynamic balance  Head turns  Backward EO/EC    Rocker board  AP/ML                MODALITIES                      Other Therapeutic Activities/Education:  HEP and importance of completion    Home Exercise Program: Issued, practiced and pt demo ability to perform 1/14/2022      Manual Treatments:  none      Modalities:  none      Communication with other providers:  POC sent      Assessment:  Pt tolerated today's treatment without any adverse reactions or complications this date. Pt did well this date with added balance exercises, having only minimal LOB on some of the foam EC exercises. Pt would continue to benefit from skilled therapy interventions to address remaining impairments, improve mobility and strength and progress toward goal completion while reducing risk for re-injury or further decline.   End pain: same      Plan for Next Session: vestibular therapy    Time In / Time Out:  1130 - 1153     Timed Code/Total Treatment Minutes:  23': 23' NMR x2    Next Progress Note due:  10th visit    Plan of Care Interventions:  [x] Therapeutic Exercise  [] Modalities:  [x] Therapeutic Activity     [] Ultrasound  [] Estim  [x] Gait Training      [] Cervical Traction [] Lumbar Traction  [x] Neuromuscular Re-education    [] Cold/hotpack [] Iontophoresis   [x] Instruction in HEP      [] Vasopneumatic   [] Dry Needling    [x] Manual Therapy               [] Aquatic Therapy              Electronically signed by:  Jaron Machado PT, DPT 1/18/2022, 7:23 AM

## 2022-01-25 ENCOUNTER — HOSPITAL ENCOUNTER (OUTPATIENT)
Dept: PHYSICAL THERAPY | Age: 77
Setting detail: THERAPIES SERIES
Discharge: HOME | End: 2022-01-25
Payer: MEDICARE

## 2022-01-25 ENCOUNTER — VIRTUAL VISIT (OUTPATIENT)
Dept: INTERNAL MEDICINE CLINIC | Age: 77
End: 2022-01-25
Payer: MEDICARE

## 2022-01-25 DIAGNOSIS — Z00.00 ROUTINE GENERAL MEDICAL EXAMINATION AT A HEALTH CARE FACILITY: Primary | ICD-10-CM

## 2022-01-25 PROCEDURE — 97112 NEUROMUSCULAR REEDUCATION: CPT

## 2022-01-25 PROCEDURE — 4040F PNEUMOC VAC/ADMIN/RCVD: CPT | Performed by: INTERNAL MEDICINE

## 2022-01-25 PROCEDURE — 3017F COLORECTAL CA SCREEN DOC REV: CPT | Performed by: INTERNAL MEDICINE

## 2022-01-25 PROCEDURE — 1123F ACP DISCUSS/DSCN MKR DOCD: CPT | Performed by: INTERNAL MEDICINE

## 2022-01-25 PROCEDURE — G0438 PPPS, INITIAL VISIT: HCPCS | Performed by: INTERNAL MEDICINE

## 2022-01-25 SDOH — ECONOMIC STABILITY: FOOD INSECURITY: WITHIN THE PAST 12 MONTHS, THE FOOD YOU BOUGHT JUST DIDN'T LAST AND YOU DIDN'T HAVE MONEY TO GET MORE.: NEVER TRUE

## 2022-01-25 SDOH — ECONOMIC STABILITY: FOOD INSECURITY: WITHIN THE PAST 12 MONTHS, YOU WORRIED THAT YOUR FOOD WOULD RUN OUT BEFORE YOU GOT MONEY TO BUY MORE.: NEVER TRUE

## 2022-01-25 ASSESSMENT — LIFESTYLE VARIABLES
HOW OFTEN DURING THE LAST YEAR HAVE YOU BEEN UNABLE TO REMEMBER WHAT HAPPENED THE NIGHT BEFORE BECAUSE YOU HAD BEEN DRINKING: NEVER
HOW OFTEN DURING THE LAST YEAR HAVE YOU BEEN UNABLE TO REMEMBER WHAT HAPPENED THE NIGHT BEFORE BECAUSE YOU HAD BEEN DRINKING: 0
AUDIT-C TOTAL SCORE: 1
HOW MANY STANDARD DRINKS CONTAINING ALCOHOL DO YOU HAVE ON A TYPICAL DAY: 0
HOW OFTEN DURING THE LAST YEAR HAVE YOU NEEDED AN ALCOHOLIC DRINK FIRST THING IN THE MORNING TO GET YOURSELF GOING AFTER A NIGHT OF HEAVY DRINKING: 0
HOW OFTEN DO YOU HAVE A DRINK CONTAINING ALCOHOL: 1
HAVE YOU OR SOMEONE ELSE BEEN INJURED AS A RESULT OF YOUR DRINKING: 0
HOW OFTEN DURING THE LAST YEAR HAVE YOU HAD A FEELING OF GUILT OR REMORSE AFTER DRINKING: NEVER
HOW OFTEN DURING THE LAST YEAR HAVE YOU NEEDED AN ALCOHOLIC DRINK FIRST THING IN THE MORNING TO GET YOURSELF GOING AFTER A NIGHT OF HEAVY DRINKING: 0
AUDIT TOTAL SCORE: 1
HOW OFTEN DURING THE LAST YEAR HAVE YOU HAD A FEELING OF GUILT OR REMORSE AFTER DRINKING: 0
AUDIT-C TOTAL SCORE: 1
HOW OFTEN DURING THE LAST YEAR HAVE YOU FAILED TO DO WHAT WAS NORMALLY EXPECTED FROM YOU BECAUSE OF DRINKING: NEVER
HOW OFTEN DURING THE LAST YEAR HAVE YOU FAILED TO DO WHAT WAS NORMALLY EXPECTED FROM YOU BECAUSE OF DRINKING: 0
HAS A RELATIVE, FRIEND, DOCTOR, OR ANOTHER HEALTH PROFESSIONAL EXPRESSED CONCERN ABOUT YOUR DRINKING OR SUGGESTED YOU CUT DOWN: NO
AUDIT TOTAL SCORE: 0
HOW OFTEN DURING THE LAST YEAR HAVE YOU HAD A FEELING OF GUILT OR REMORSE AFTER DRINKING: 0
AUDIT-C TOTAL SCORE: 0
HOW OFTEN DURING THE LAST YEAR HAVE YOU FOUND THAT YOU WERE NOT ABLE TO STOP DRINKING ONCE YOU HAD STARTED: NEVER
HOW OFTEN DURING THE LAST YEAR HAVE YOU NEEDED AN ALCOHOLIC DRINK FIRST THING IN THE MORNING TO GET YOURSELF GOING AFTER A NIGHT OF HEAVY DRINKING: NEVER
HOW OFTEN DO YOU HAVE A DRINK CONTAINING ALCOHOL: MONTHLY OR LESS
HOW OFTEN DURING THE LAST YEAR HAVE YOU BEEN UNABLE TO REMEMBER WHAT HAPPENED THE NIGHT BEFORE BECAUSE YOU HAD BEEN DRINKING: 0
AUDIT TOTAL SCORE: 1
HOW MANY STANDARD DRINKS CONTAINING ALCOHOL DO YOU HAVE ON A TYPICAL DAY: 0
HOW OFTEN DURING THE LAST YEAR HAVE YOU FOUND THAT YOU WERE NOT ABLE TO STOP DRINKING ONCE YOU HAD STARTED: 0
HOW MANY STANDARD DRINKS CONTAINING ALCOHOL DO YOU HAVE ON A TYPICAL DAY: ONE OR TWO
HAS A RELATIVE, FRIEND, DOCTOR, OR ANOTHER HEALTH PROFESSIONAL EXPRESSED CONCERN ABOUT YOUR DRINKING OR SUGGESTED YOU CUT DOWN: 0
HAS A RELATIVE, FRIEND, DOCTOR, OR ANOTHER HEALTH PROFESSIONAL EXPRESSED CONCERN ABOUT YOUR DRINKING OR SUGGESTED YOU CUT DOWN: 0
HAVE YOU OR SOMEONE ELSE BEEN INJURED AS A RESULT OF YOUR DRINKING: 0
HAVE YOU OR SOMEONE ELSE BEEN INJURED AS A RESULT OF YOUR DRINKING: NO
HOW OFTEN DURING THE LAST YEAR HAVE YOU FAILED TO DO WHAT WAS NORMALLY EXPECTED FROM YOU BECAUSE OF DRINKING: 0
HOW OFTEN DURING THE LAST YEAR HAVE YOU FOUND THAT YOU WERE NOT ABLE TO STOP DRINKING ONCE YOU HAD STARTED: 0
HOW OFTEN DO YOU HAVE SIX OR MORE DRINKS ON ONE OCCASION: NEVER
HOW OFTEN DO YOU HAVE SIX OR MORE DRINKS ON ONE OCCASION: 0
HOW OFTEN DO YOU HAVE A DRINK CONTAINING ALCOHOL: 1
HOW OFTEN DO YOU HAVE SIX OR MORE DRINKS ON ONE OCCASION: 0

## 2022-01-25 ASSESSMENT — PATIENT HEALTH QUESTIONNAIRE - PHQ9
SUM OF ALL RESPONSES TO PHQ QUESTIONS 1-9: 0
1. LITTLE INTEREST OR PLEASURE IN DOING THINGS: 0
2. FEELING DOWN, DEPRESSED OR HOPELESS: 0
SUM OF ALL RESPONSES TO PHQ QUESTIONS 1-9: 0
2. FEELING DOWN, DEPRESSED OR HOPELESS: 0
SUM OF ALL RESPONSES TO PHQ QUESTIONS 1-9: 0
SUM OF ALL RESPONSES TO PHQ9 QUESTIONS 1 & 2: 0
1. LITTLE INTEREST OR PLEASURE IN DOING THINGS: 0
SUM OF ALL RESPONSES TO PHQ QUESTIONS 1-9: 0
SUM OF ALL RESPONSES TO PHQ9 QUESTIONS 1 & 2: 0

## 2022-01-25 ASSESSMENT — SOCIAL DETERMINANTS OF HEALTH (SDOH): HOW HARD IS IT FOR YOU TO PAY FOR THE VERY BASICS LIKE FOOD, HOUSING, MEDICAL CARE, AND HEATING?: NOT HARD AT ALL

## 2022-01-25 NOTE — PATIENT INSTRUCTIONS
Personalized Preventive Plan for Roxanna Gavin - 1/25/2022  Medicare offers a range of preventive health benefits. Some of the tests and screenings are paid in full while other may be subject to a deductible, co-insurance, and/or copay. Some of these benefits include a comprehensive review of your medical history including lifestyle, illnesses that may run in your family, and various assessments and screenings as appropriate. After reviewing your medical record and screening and assessments performed today your provider may have ordered immunizations, labs, imaging, and/or referrals for you. A list of these orders (if applicable) as well as your Preventive Care list are included within your After Visit Summary for your review. Other Preventive Recommendations:    · A preventive eye exam performed by an eye specialist is recommended every 1-2 years to screen for glaucoma; cataracts, macular degeneration, and other eye disorders. · A preventive dental visit is recommended every 6 months. · Try to get at least 150 minutes of exercise per week or 10,000 steps per day on a pedometer . · Order or download the FREE \"Exercise & Physical Activity: Your Everyday Guide\" from [employer]. Call 0-364.924.8230 or search [employer] online. · You need 9060-3834 mg of calcium and 5053-1463 IU of vitamin D per day. It is possible to meet your calcium requirement with diet alone, but a vitamin D supplement is usually necessary to meet this goal.  · When exposed to the sun, use a sunscreen that protects against both UVA and UVB radiation with an SPF of 30 or greater. Reapply every 2 to 3 hours or after sweating, drying off with a towel, or swimming. · Always wear a seat belt when traveling in a car. Always wear a helmet when riding a bicycle or motorcycle.

## 2022-01-25 NOTE — PROGRESS NOTES
Medicare Annual Wellness Visit  Name: Portillo Gomez Date: 2022   MRN: [id number] Sex: Female   Age: 77 y.o. Ethnicity: Non- / Non    : 1945 Race: White (non-)      Noe Jasmine is here for Medicare AWV    Screenings for behavioral, psychosocial and functional/safety risks, and cognitive dysfunction are all negative except as indicated below. These results, as well as other patient data from the [address] form, are documented in Flowsheets linked to this Encounter. Allergies   Allergen Reactions    Medrol [Methylprednisolone] Palpitations    Ofloxacin Hives    Prednisone Other (See Comments)       Prior to Visit Medications    Medication Sig Taking?  Authorizing Provider   levothyroxine (SYNTHROID) 150 MCG tablet TAKE 1 TABLET BY MOUTH EVERY DAY Yes Carlos Mesa MD   ipratropium (ATROVENT) 0.03 % nasal spray 2 sprays by Each Nostril route every 12 hours Yes Carlos Mesa MD   budesonide-formoterol [facility]) 160-4.5 MCG/ACT AERO Inhale 2 puffs into the lungs every 12 hours After inhalation then gargle Yes Carlos Mesa MD   ipratropium-albuterol (DUONEB) 0.5-2.5 (3) MG/3ML SOLN nebulizer solution Inhale 3 mLs into the lungs every 6 hours Yes Carlos Mesa MD   metoprolol succinate (TOPROL XL) 50 MG extended release tablet TAKE 1 TABLET BY MOUTH EVERY DAY Yes Carlos Mesa MD   lisinopril (PRINIVIL;ZESTRIL) 40 MG tablet TAKE 1 TABLET BY MOUTH EVERY DAY Yes Carlos Mesa MD   allopurinol (ZYLOPRIM) 100 MG tablet TAKE 1 TABLET BY MOUTH EVERY DAY Yes Carlos Mesa MD   Cholecalciferol (VITAMIN D3) 50 MCG (2000 UT) CAPS Take 2 capsules by mouth daily Yes Carlos Mesa MD   spironolactone (ALDACTONE) 50 MG tablet Take 1 tablet by mouth daily Yes Carlos Mesa MD   pravastatin (PRAVACHOL) 40 MG tablet TAKE 1 TABLET BY MOUTH EVERY DAY Yes Carlos Mesa MD PROAIR RESPICLICK 923 (90 Base) MCG/ACT aerosol powder inhalation Inhale 2 puffs into the lungs every 4 hours as needed for Wheezing or Shortness of Breath Yes Jose Valdez MD   colchicine (COLCRYS) 0.6 MG tablet Take 1 tablet by mouth 2 times daily Yes Jose Valdez MD   Naproxen Sodium (ALEVE PO) Take by mouth as needed    Historical Provider, MD       Past Medical History:   Diagnosis Date    Arrhythmia     Asthma     Cough variant asthma 2/15/2016    Elevated transaminase level     Epicondylitis, lateral     H/O 24 hour EKG monitoring 9/29/00    SR. freq PVCs. occ couplets. one triplet but no sustained VT. rare PACs, which are benign. one episode of 3 PAC's in a row but no sustained VT    H/O cardiovascular stress test 6/9/09, 4/06, 1/03, 10/01, 9/00 6/9/09: Ray protocol,normal  study. exercise 7 METS     H/O echocardiogram 6/9/09, 4/21/05, 5/02, 9/00 6/9/09: LV normal size and wall thickness. LVSF normal. EF = 50-55%. transmitral spectral dopp flow suggest impaired LV relaxation,  mild mitral regurg    H/O exercise stress test 10/30/2018    treadmill    History of cardiac cath 11/15/2018    SOTELO , LAD and diag are widely patent RCA and Circ are widely patent LVEDP was 10 mmHG    History of complete ECG 5/9/10, 5/11/09, 10/17/02, 10/11/01, 9/27/00    History of nuclear stress test 11/04/2018    Lexiscan, EF 75%, Mild to Mod medium size anteriaor wall ischemia.     Hx of cardiovascular stress test 9/3/2015    cardiolite-normal,EF70%    Hx of colonoscopy     9/13 c-scope WNL; 6/8 C-scope diverticula    Hx of echocardiogram 9/3/15    EF 55%, Trace MR & Trace TR    Hypercalcemia     Hyperlipidemia     Hyperparathyroidism ([address] 12/12/2018    Hypertension     6/9 Stress WNL,; 6/9 TTE EF 43-21%, diastolic dysfxn    Hypothyroidism     Knee pain, bilateral     Mild persistent asthma without complication 9/54/7631    Premature ventricular contraction     Torticollis, spasmodic     s/p phenol injections       Past Surgical History:   Procedure Laterality Date    BREAST BIOPSY      left    DILATION AND CURETTAGE OF UTERUS  1997, 1999    KNEE ARTHROSCOPY  08/2010    left, [place]    TONSILLECTOMY  1961    TUBAL LIGATION  1989       Family History   Problem Relation Age of Onset    Lung Cancer Father     Cancer Father     Diabetes Mother     Stroke Mother     Hypertension Mother     Heart Disease Mother     No Known Problems Sister     Hypertension Sister     Diabetes Sister     Elevated Lipids Sister         x3    Hypertension Sister     Atrial Fibrillation Sister     Diabetes Sister     Obesity Sister     Pacemaker Sister     Elevated Lipids Daughter         x1       CareTeam (Including outside providers/suppliers regularly involved in providing care):   Patient Care Team:  Ramone Mcdaniels MD as PCP - General (Internal Medicine)  Ramone Mcdaniels MD as PCP - [facility] Empaneled Provider  Daron Nair MD as Consulting Physician (Cardiology)    Wt Readings from Last 3 Encounters:   12/13/21 190 lb (86.2 kg)   07/12/21 180 lb (81.6 kg)   06/18/21 188 lb 6.4 oz (85.5 kg)     There were no vitals filed for this visit. There is no height or weight on file to calculate BMI. Based upon direct observation of the patient, evaluation of cognition reveals recent and remote memory intact. Patient's complete Health Risk Assessment and screening values have been reviewed and are found in Flowsheets. The following problems were reviewed today and where indicated follow up appointments were made and/or referrals ordered. Positive Risk Factor Screenings with Interventions:          General Health and ACP:  General  In general, how would you say your health is?: Good  In the past 7 days, have you experienced any of the following?  New or Increased Pain, New or Increased Fatigue, Loneliness, Social Isolation, Stress or Anger?: None of These  Do you get the social and emotional support that you need?: Yes  Do you have a Living Will?: Yes  Advance Directives     Power of [address] Will ACP-Advance Directive ACP-Power of     Not on File Not on File Not on File Not on File      General Health Risk Interventions:  · No Living Will: ACP documents already completed- patient asked to provide copy to the office    Health Habits/Nutrition:  Health Habits/Nutrition  Do you exercise for at least 20 minutes 2-3 times per week?: Yes  Have you lost any weight without trying in the past 3 months?: No  Do you eat only one meal per day?: No  Have you seen the dentist within the past year?: Yes     Health Habits/Nutrition Interventions:  · Nutritional issues:  patient is not ready to address his/her nutritional/weight issues at this time      ADL:  ADLs  In the past 7 days, did you need help from others to perform any of the following everyday activities? Eating, dressing, grooming, bathing, toileting, or walking/balance?: None  In the past 7 days, did you need help from others to take care of any of the following? Laundry, housekeeping, banking/finances, shopping, telephone use, food preparation, transportation, or taking medications?: (!) Transportation ()  ADL Interventions:  · Patient declines any further evaluation/treatment for this issue, states her  drives her due to her diagnosis of torticollis, and she feels like she is not safe driving.     Personalized Preventive Plan   Current Health Maintenance Status  Immunization History   Administered Date(s) Administered    COVID-19, Pfizer Purple top, DILUTE for use, 12+ yrs, 30mcg/0.3mL dose 02/03/2021, 02/25/2021, 12/03/2021    Influenza 10/24/2012    Influenza, High Dose (Fluzone 65 yrs and older) 09/08/2014, 11/16/2015, 11/26/2016, 12/05/2017, 10/01/2018, 10/25/2019    Influenza, High-dose, Reece , 65 yrs +, IM (Fluzone) 12/13/2021    Influenza, Intradermal, Preservative free 11/07/2013    Pneumococcal Conjugate 13-valent (Grlsdew76) 11/16/2015    Pneumococcal Polysaccharide (Bucsebptx26) 10/24/2012    Td, unspecified formulation 03/01/2002    Tdap (Boostrix, Adacel) 05/02/2013    Zoster Live (Zostavax) 05/18/2013    Zoster Recombinant (Shingrix) 02/09/2019, 04/16/2019        Health Maintenance   Topic Date Due    Annual Wellness Visit (AWV)  Never done    Lipid screen  12/09/2022    Potassium monitoring  12/09/2022    Creatinine monitoring  12/09/2022    Depression Screen  01/25/2023    DTaP/Tdap/Td vaccine (2 - Td or Tdap) 05/02/2023    Colon cancer screen colonoscopy  04/26/2024    DEXA (modify frequency per FRAX score)  Completed    Flu vaccine  Completed    Shingles Vaccine  Completed    Pneumococcal 65+ years Vaccine  Completed    COVID-19 Vaccine  Completed    Hepatitis C screen  Completed    Hepatitis A vaccine  Aged Out    Hepatitis B vaccine  Aged Out    Hib vaccine  Aged Out    Meningococcal (ACWY) vaccine  Aged Out     Recommendations for Browsy Due: see orders and patient instructions/AVS.     Unable to obtain 3 vital signs due to patient not having equipment to take blood pressure/temperature. Recommended screening schedule for the next 5-10 years is provided to the patient in written form: see Patient Instructions/AVS.    Rukhsana MARIA LPN, 0/44/9073, performed the documented evaluation under the direct supervision of the attending physician. Adina Rice, was evaluated through a synchronous (real-time) audio encounter. The patient (or guardian if applicable) is aware that this is a billable service, which includes applicable co-pays. This Virtual Visit was conducted with patient's (and/or legal guardian's) consent. The visit was conducted pursuant to the emergency declaration under the [facility][note 3][facility], [address] [place] [address] authority and the [employer] and [employer] General Act. Patient identification was verified, and a caregiver was present when appropriate. The patient was located at home in the state of [place], where the provider was licensed to provide care. Total time spent for this encounter: Not billed by time    --Timoteo Alcantara LPN on 6/52/8633 at 9:23 AM    An electronic signature was used to authenticate this note. This encounter was performed under myKhloe MDs, direct supervision, 1/25/2022.

## 2022-01-25 NOTE — FLOWSHEET NOTE
Estim  [x] Gait Training      [] Cervical Traction [] Lumbar Traction  [x] Neuromuscular Re-education    [] Cold/hotpack [] Iontophoresis   [x] Instruction in HEP      [] Vasopneumatic   [] Dry Needling    [x] Manual Therapy               [] Aquatic Therapy              Electronically signed by:  Jorge Liz PT, DPT 1/25/2022, 8:07 AM

## 2022-02-01 ENCOUNTER — HOSPITAL ENCOUNTER (OUTPATIENT)
Dept: PHYSICAL THERAPY | Age: 77
Setting detail: THERAPIES SERIES
Discharge: HOME | End: 2022-02-01
Payer: MEDICARE

## 2022-02-01 PROCEDURE — 97112 NEUROMUSCULAR REEDUCATION: CPT

## 2022-02-01 NOTE — FLOWSHEET NOTE
Outpatient Physical Therapy  [place]           [x] Phone: [phone]   Fax: [phone]  [place]           [] Phone: [phone]   Fax: [phone]        Physical Therapy Daily Treatment Note  Date:  2022    Patient Name:  Araceli Weathers    :  1945  MRN: [id number]  Restrictions/Precautions:  none  Diagnosis:   Diagnosis: Benign paroxysmal positional vertigo R ear  Date of Injury/Surgery:   Treatment Diagnosis: Treatment Diagnosis: impaired balance    Insurance/Certification information: PT Insurance Information: Medicare   Referring Physician:  Referring Practitioner: Rhett Bone MD  Next Doctor Visit:    Plan of care signed (Y/N):  Y  Outcome Measure: DGI: 15/24. DHI: 22  Visit# / total visits:     Pain level: 0/10     Goals:     Patient goals : reduce dizziness and off feelings  Short term goals  Time Frame for Short term goals: Refer to [facility]  Long term goals  Time Frame for Long term goals : 8 visits  Long term goal 1: Pt will improve DHI to 15 or less to show subjective improvement  Long term goal 2: Pt will improve DGI to 20 or more to show improved balance and reduced fall risk  Long term goal 3: Pt will report overall improvement of condition by 50% or more  Long term goal 4: Pt will improve romberg EC on foam to 30 seconds min sway    Summary of Evaluation: Assessment: Pt is a 52-year-old female who presents to therapy with new onset of dizziness and some LOB starting ~2-3 weeks ago. Upon assessment, pt was negative for BL posterior and horizontal SCC BPPV. Pt did present with mild VOR impairment and moderate VSR balance impairment indicating deconditioned vestibular system, most likely R>L. Pt would benefit from skilled therapy interventions to address listed impairments, progress toward goal completion and improve ADL/IADL status. PT also warranted to reduce risk for further injury or decline.       Subjective:  Pt is doing well this date and notes that she is still less dizzy than on eval. She does not have any room spinning but slight fuzzy when she rolls. Any changes in Ambulatory Summary Sheet? None      Objective:    COVID screening questions were asked and patient attested that there had been no contact or symptoms    Minor LOB EC on rocker board    Exercises: (No more than 4 columns)   Exercise/Equipment 1/18/22 #2 1/25/22 #3 2/1/22 #4           WARM UP         vorx1 Review for HEP and show alternate ways to reduce head movement                            PROPRIOCEPTION      Static standing Foam: romberg EO/EC, WBOS EO/EC, SLS EO     Foam: romberg EO/EC, 1/2 tandem EO/EC Foam: romberg EO/EC, 1/2 tandem EO/EC   Foam strip Sidestep  tandem tandem tandem   Marching EO/EC foam EO/EC foam EO/EC foam   Cone taps Foam  Foam between 1 Foam between 1   Dynamic balance Head turns  Backward EO/EC tandem    Rocker board AP/ML AP/ML AP EO/EC, ML EO   Dual tasking   Cone and ball w/ gait fwd and back         MODALITIES                      Other Therapeutic Activities/Education:  HEP and importance of completion    Home Exercise Program: Issued, practiced and pt demo ability to perform 1/14/2022      Manual Treatments:  none      Modalities:  none      Communication with other providers:  POC sent      Assessment:  Pt tolerated today's treatment without any adverse reactions or complications this date. Pt is continuing to show improvement in balance and dizziness at home. Pt to continue PT for this month and dc after next few weeks. Pt would continue to benefit from skilled therapy interventions to address remaining impairments, improve mobility and strength and progress toward goal completion while reducing risk for re-injury or further decline.   End pain: same    Plan for Next Session: vestibular therapy    Time In / Time Out:  4035 - 4345    Timed Code/Total Treatment Minutes:  29': 29' NMR x2    Next Progress Note due:  10th visit    Plan of Care Interventions:  [x]

## 2022-02-08 ENCOUNTER — HOSPITAL ENCOUNTER (OUTPATIENT)
Dept: PHYSICAL THERAPY | Age: 77
Setting detail: THERAPIES SERIES
Discharge: HOME | End: 2022-02-08
Payer: MEDICARE

## 2022-02-08 PROCEDURE — 97112 NEUROMUSCULAR REEDUCATION: CPT

## 2022-02-08 NOTE — FLOWSHEET NOTE
Outpatient Physical Therapy  [place]           [x] Phone: [phone]   Fax: [phone]  [place]           [] Phone: [phone]   Fax: [phone]        Physical Therapy Daily Treatment Note  Date:  2022    Patient Name:  Yosef Ntetles    :  1945  MRN: [id number]  Restrictions/Precautions:  none  Diagnosis:   Diagnosis: Benign paroxysmal positional vertigo R ear  Date of Injury/Surgery:   Treatment Diagnosis: Treatment Diagnosis: impaired balance    Insurance/Certification information: PT Insurance Information: Medicare   Referring Physician:  Referring Practitioner: Coty Moss MD  Next Doctor Visit:    Plan of care signed (Y/N):  Y  Outcome Measure: DGI: 15/24. DHI: 22  Visit# / total visits:     Pain level: 0/10     Goals:     Patient goals : reduce dizziness and off feelings  Short term goals  Time Frame for Short term goals: [facility]er to [facility]  Long term goals  Time Frame for Long term goals : 8 visits  Long term goal 1: Pt will improve DHI to 15 or less to show subjective improvement  Long term goal 2: Pt will improve DGI to 20 or more to show improved balance and reduced fall risk  Long term goal 3: Pt will report overall improvement of condition by 50% or more  Long term goal 4: Pt will improve romberg EC on foam to 30 seconds min sway    Summary of Evaluation: Assessment: Pt is a 80-year-old female who presents to therapy with new onset of dizziness and some LOB starting ~2-3 weeks ago. Upon assessment, pt was negative for BL posterior and horizontal SCC BPPV. Pt did present with mild VOR impairment and moderate VSR balance impairment indicating deconditioned vestibular system, most likely R>L. Pt would benefit from skilled therapy interventions to address listed impairments, progress toward goal completion and improve ADL/IADL status. PT also warranted to reduce risk for further injury or decline.       Subjective:  Pt is doing well this date with continued improvement in dizziness. She is still having some lightheaded dizziness when rolling in bed, but no spinning and much less overall than on eval.    Any changes in Ambulatory Summary Sheet? None    Objective:    COVID screening questions were asked and patient attested that there had been no contact or symptoms    -some lateral sway tandem dual tasking  -LOB on tandem foam    Exercises: (No more than 4 columns)   Exercise/Equipment 1/25/22 #3 2/1/22 #4 2/8/22 #5           WARM UP         vorx1   VOR x1 w/ 2 cards                          PROPRIOCEPTION      Static standing Foam: romberg EO/EC, 1/2 tandem EO/EC Foam: romberg EO/EC, 1/2 tandem EO/EC Foam: romberg EO/EC, 1/2 tandem EO/EC   Foam strip tandem tandem Tandem fwd   Marching EO/EC foam EO/EC foam EO/EC foam   Cone taps Foam between 1 Foam between 1 Foam between 2 foot    Foam between 1 hands   Dynamic balance tandem  Head turns horizontal and vertical    Rocker board AP/ML AP EO/EC, ML EO AP/ML EO   Dual tasking  Cone and ball w/ gait fwd and back Cone and ball w/ fwd/back gait    Dual cog task w/ tandem         MODALITIES                      Other Therapeutic Activities/Education:  HEP and importance of completion    Home Exercise Program: Issued, practiced and pt demo ability to perform 1/14/2022      Manual Treatments:  none      Modalities:  none      Communication with other providers:  POC sent      Assessment:  Pt tolerated today's treatment without any adverse reactions or complications this date. Pt did well with updated HEP this date. She did have some LOB and sway with tandem dual tasking and tandem on foam, improved as exercise went on. Pt would continue to benefit from skilled therapy interventions to address remaining impairments, improve mobility and strength and progress toward goal completion while reducing risk for re-injury or further decline.   End pain: same    Plan for Next Session: vestibular therapy    Time In / Time Out:  1560 - 1155    Timed Code/Total Treatment Minutes:  26': 32' NMR x2    Next Progress Note due:  10th visit    Plan of Care Interventions:  [x] Therapeutic Exercise  [] Modalities:  [x] Therapeutic Activity     [] Ultrasound  [] Estim  [x] Gait Training      [] Cervical Traction [] Lumbar Traction  [x] Neuromuscular Re-education    [] Cold/hotpack [] Iontophoresis   [x] Instruction in HEP      [] Vasopneumatic   [] Dry Needling    [x] Manual Therapy               [] Aquatic Therapy              Electronically signed by:  Kristie Jeffery PT, DPT 2/8/2022, 7:47 AM

## 2022-02-18 ENCOUNTER — TELEPHONE (OUTPATIENT)
Dept: INTERNAL MEDICINE CLINIC | Age: 77
End: 2022-02-18

## 2022-02-18 ENCOUNTER — HOSPITAL ENCOUNTER (OUTPATIENT)
Dept: PHYSICAL THERAPY | Age: 77
Setting detail: THERAPIES SERIES
Discharge: HOME | End: 2022-02-18
Payer: MEDICARE

## 2022-02-18 DIAGNOSIS — M54.2 NECK PAIN: Primary | ICD-10-CM

## 2022-02-18 PROCEDURE — 97112 NEUROMUSCULAR REEDUCATION: CPT

## 2022-02-18 NOTE — TELEPHONE ENCOUNTER
Patient would like to know if she could get a physical therapy referral for neck pain. States she is currently going to PT for vertigo. Please advise. Thank you!

## 2022-02-18 NOTE — FLOWSHEET NOTE
Outpatient Physical Therapy  [place]           [x] Phone: [phone]   Fax: [phone]  [place]           [] Phone: [phone]   Fax: [phone]        Physical Therapy Daily Treatment Note  Date:  2022    Patient Name:  Ayanna Montgomery    :  1945  MRN: [id number]  Restrictions/Precautions:  none  Diagnosis:   Diagnosis: Benign paroxysmal positional vertigo R ear  Date of Injury/Surgery:   Treatment Diagnosis: Treatment Diagnosis: impaired balance    Insurance/Certification information: PT Insurance Information: Medicare   Referring Physician:  Referring Practitioner: Mony Pacheco MD  Next Doctor Visit:    Plan of care signed (Y/N):  Y  Outcome Measure: DGI: 15/24. DHI: 22  Visit# / total visits:      Pain level: 0/10     Goals:     Patient goals : reduce dizziness and off feelings  Short term goals  Time Frame for Short term goals: Refer to [facility]  Long term goals  Time Frame for Long term goals : 8 visits  Long term goal 1: Pt will improve DHI to 15 or less to show subjective improvement  Long term goal 2: Pt will improve DGI to 20 or more to show improved balance and reduced fall risk  Long term goal 3: Pt will report overall improvement of condition by 50% or more  Long term goal 4: Pt will improve romberg EC on foam to 30 seconds min sway    Summary of Evaluation: Assessment: Pt is a 80-year-old female who presents to therapy with new onset of dizziness and some LOB starting ~2-3 weeks ago. Upon assessment, pt was negative for BL posterior and horizontal SCC BPPV. Pt did present with mild VOR impairment and moderate VSR balance impairment indicating deconditioned vestibular system, most likely R>L. Pt would benefit from skilled therapy interventions to address listed impairments, progress toward goal completion and improve ADL/IADL status. PT also warranted to reduce risk for further injury or decline.       Subjective:  Pt did get Botox injections for her torticollis on Monday, has not noticed anything yet but reduced pain can take about 2 weeks. Any changes in Ambulatory Summary Sheet? None    Objective:    COVID screening questions were asked and patient attested that there had been no contact or symptoms    Improved rocker board EO/EC with little to no sway    Exercises: (No more than 4 columns)   Exercise/Equipment 2/1/22 #4 2/8/22 #5 2/18/22 #6           WARM UP         vorx1  VOR x1 w/ 2 cards VOR x1 w/ 2 cards                          PROPRIOCEPTION      Static standing Foam: romberg EO/EC, 1/2 tandem EO/EC Foam: romberg EO/EC, 1/2 tandem EO/EC Foam: romberg EO/EC, 1/2 tandem EO/EC   Foam strip tandem Tandem fwd Tandem fwd   Marching EO/EC foam EO/EC foam EO/EC foam   Cone taps Foam between 1 Foam between 2 foot    Foam between 1 hands    Dynamic balance  Head turns horizontal and vertical  Head turns  Tandem   Rocker board AP EO/EC, ML EO AP/ML EO AP EO/EC, ML EO   Dual tasking Cone and ball w/ gait fwd and back Cone and ball w/ fwd/back gait    Dual cog task w/ tandem          MODALITIES                      Other Therapeutic Activities/Education:  HEP and importance of completion    Home Exercise Program: Issued, practiced and pt demo ability to perform 1/14/2022      Manual Treatments:  none      Modalities:  none      Communication with other providers:  POC sent      Assessment:  Pt tolerated today's treatment without any adverse reactions or complications this date. Pt is continuing to do very well with balance and is improving even with EC. HEP reviewed this date to ensure proper completion at home and max benefit. Pt would continue to benefit from skilled therapy interventions to address remaining impairments, improve mobility and balance and progress toward goal completion while reducing risk for re-injury or further decline.   End pain: same    Plan for Next Session: vestibular therapy    Time In / Time Out:  9116 - 2463    Timed Code/Total Treatment Minutes: 32': 32' NMR x2    Next Progress Note due:  10th visit    Plan of Care Interventions:  [x] Therapeutic Exercise  [] Modalities:  [x] Therapeutic Activity     [] Ultrasound  [] Estim  [x] Gait Training      [] Cervical Traction [] Lumbar Traction  [x] Neuromuscular Re-education    [] Cold/hotpack [] Iontophoresis   [x] Instruction in HEP      [] Vasopneumatic   [] Dry Needling    [x] Manual Therapy               [] Aquatic Therapy              Electronically signed by:  Keira Aceves PT, DPT 2/18/2022, 6:53 AM

## 2022-02-21 ENCOUNTER — HOSPITAL ENCOUNTER (OUTPATIENT)
Dept: PHYSICAL THERAPY | Age: 77
Setting detail: THERAPIES SERIES
Discharge: HOME | End: 2022-02-21
Payer: MEDICARE

## 2022-02-21 PROCEDURE — 97164 PT RE-EVAL EST PLAN CARE: CPT

## 2022-02-21 PROCEDURE — 97112 NEUROMUSCULAR REEDUCATION: CPT

## 2022-02-21 PROCEDURE — 97110 THERAPEUTIC EXERCISES: CPT

## 2022-02-21 NOTE — PROGRESS NOTES
Outpatient Physical Therapy           [facility]           [x] Phone: [phone]   Fax: [phone]  [facility]           [] Phone: [phone]   Fax: [phone]      To:   Trever Rao MD    From: Mark Gant, PT, DPT     Patient: Olga Zuñiga                  : 1945  Diagnosis:    Benign paroxysmal positional vertigo R ear. Neck Pain. Date: 2022  Treatment Diagnosis:   impaired balance, neck pain. [x]  Re-eval                []  Discharge Note     Evaluation Date: 22 (vestibular) 22 (neck) Total Visits to date:  7  Cancels/No-shows to date:  0    Subjective:  Pt notes that overall her dizziness is a lot better than on eval. She is still not having any room spinning, the only dizziness she is still experiencing is occasional fogginess when she gets up (more in the middle of the night) and that has improved also. She is not having much issues with balance either, no falls. Overall she feels her vestibular involvement is 100% better than on eval.  DHI: 2  Pt was dx with spastic torticollis ~24 years ago. She has done Botox and it has not helped significantly but has never tried PT for it. Pt notes that she does not have pain at rest. Pt notes when she lays down at night or when she is stressed she feels more of the tightness. She notes that sometimes she has trouble eating due to the movement of her head. She is R handed and sometimes doing chores with her R hand increase symptoms. Walks can also be difficult due to her neck pulling and trying to look where she's going.       Plan of Care/Treatment to date:  [x] Therapeutic Exercise    [x] Modalities:  [x] Therapeutic Activity     [x] Ultrasound  [x] Electrical Stimulation  [x] Gait Training      [] Cervical Traction   [] Lumbar Traction  [x] Neuromuscular Re-education  [x] Cold/hotpack [] Iontophoresis  [x] Instruction in HEP      Other:  [x] Manual Therapy       [x]  Vasopneumatic  [] Aquatic Therapy       [x]   Dry Needle Therapy                      Objective/Significant Findings At Last Visit/Comments:    AROM C spine (in deg):   R SB: 39  L SB: 26  R Rot: 41  L Rot: 35  Flex: WFL  Ext: ~50% normal motion  Palpation: increased tissue tension R SCM, UT, LS, suboccipitals, rhomboids, global scapular and R shoulder muscles  AROM BL shoulders: WFL all directions     DGI: 22/24  Foam balance: EC romberg >30 sec min to no sway    Assessment:   Pt has shown very good progress with vestibular rehab at this time and has met all original goals that were set for her dizziness/ balance. Vestibular therapy to be done today and new script for neck pain re-evaled this date. Pt does present with reduced C spine AROM on R side due to torticollis and increased muscle tightness and tension upon palpation. Pt would continue to benefit from skilled therapy interventions to address remaining impairments, improve mobility and ROM and progress toward goal completion while reducing risk for re-injury or further decline.     Goal Status:  [x] Achieved [x] Partially Achieved  [] Not Achieved     Changes to goals:    Patient goals : reduce dizziness and off feelings  Short term goals  Time Frame for Short term goals: Refer to LTG  Long term goals  Time Frame for Long term goals : 8 visits  Long term goal 1: Pt will improve DHI to 15 or less to show subjective improvement: MET 2/21  Long term goal 2: Pt will improve DGI to 20 or more to show improved balance and reduced fall risk: MET 2/21  Long term goal 3: Pt will report overall improvement of condition by 50% or more MET 2/21  Long term goal 4: Pt will improve romberg EC on foam to 30 seconds min sway MET 2/21  LTG 5: Pt will improve L SB AROM C spine to 35 or more to aide in ADLs: New Goal 2/21  LTG 6: Pt will improve L Rot AROM C spine to 40 or more to aide in ADLs:  New Goal 2/21        Frequency/Duration:  # Days per week: [] 1 day # Weeks: [] 1 week [] 4 weeks [] 8 weeks     [x] 2 days   [] 2 weeks [x] 5 weeks [] 10 weeks     [] 3 days   [] 3 weeks [] 6 weeks [] 12 weeks       Rehab Potential: [] Excellent [x] Good [] Fair  [] Poor       Patient Status: [] Continue per initial plan of Care     [] Patient now discharged     [x] Additional visits requested, Please re-certify for additional visits:      Requested frequency/duration:  2 week for 5 weeks    If we are requesting more visits, we fully anticipate the patient's condition is expected to improve within the treatment timeframe we are requesting. Electronically signed by:  Dariel Beyer PT, DPT, 2/21/2022, 11:34 AM    If you have any questions or concerns, please don't hesitate to call.   Thank you for your referral.    Physician Signature:______________________ Date:______ Time: ________  By signing above, therapists plan is approved by physician

## 2022-02-21 NOTE — FLOWSHEET NOTE
Outpatient Physical Therapy  [facility]           [x] Phone: [phone]   Fax: [phone]  [facility]           [] Phone: [phone]   Fax: [phone]        Physical Therapy Daily Treatment Note  Date:  2022    Patient Name:  Svetlana Lincoln    :  1945  MRN: [id number]  Restrictions/Precautions:  none  Diagnosis:   Diagnosis: Benign paroxysmal positional vertigo R ear. Neck Pain. Date of Injury/Surgery:   Treatment Diagnosis: Treatment Diagnosis: impaired balance, neck pain. Insurance/Certification information: PT Insurance Information: Medicare   Referring Physician:  Referring Practitioner: Dion Gutierrez MD  Next Doctor Visit:    Plan of care signed (Y/N):  Y  Outcome Measure: DGI: . DHI: 2   Visit# / total visits:    New POC for neck: 1/10   Pain level: 0/10     Goals:      Patient goals : reduce dizziness and off feelings  Short term goals  Time Frame for Short term goals: Refer to LTG  Long term goals  Time Frame for Long term goals : 8 visits  Long term goal 1: Pt will improve DHI to 15 or less to show subjective improvement: MET   Long term goal 2: Pt will improve DGI to 20 or more to show improved balance and reduced fall risk: MET   Long term goal 3: Pt will report overall improvement of condition by 50% or more MET   Long term goal 4: Pt will improve romberg EC on foam to 30 seconds min sway MET   LTG 5: Pt will improve L SB AROM C spine to 35 or more to aide in ADLs: New Goal   LTG 6: Pt will improve L Rot AROM C spine to 40 or more to aide in ADLs:  New Goal     Summary of Evaluation: Assessment: Pt is a 71-year-old female who presents to therapy with new onset of dizziness and some LOB starting ~2-3 weeks ago. Upon assessment, pt was negative for BL posterior and horizontal SCC BPPV. Pt did present with mild VOR impairment and moderate VSR balance impairment indicating deconditioned vestibular system, most likely R>L.  Pt would benefit from skilled therapy interventions to address listed impairments, progress toward goal completion and improve ADL/IADL status. PT also warranted to reduce risk for further injury or decline. Subjective:  Pt notes that overall her dizziness is a lot better than on eval. She is still not having any room spinning, the only dizziness she is still experiencing is occasional fogginess when she gets up (more in the middle of the night) and that has improved also. She is not having much issues with balance either, no falls. Overall she feels her vestibular involvement is 100% better than on eval.  DHI: 2  Pt was dx with spastic torticollis ~24 years ago. She has done Botox and it has not helped significantly but has never tried PT for it. Pt notes that she does not have pain at rest. Pt notes when she lays down at night or when she is stressed she feels more of the tightness. She notes that sometimes she has trouble eating due to the movement of her head. She is R handed and sometimes doing chores with her R hand increase symptoms. Walks can also be difficult due to her neck pulling and trying to look where she's going. Any changes in Ambulatory Summary Sheet?   None    Objective:    COVID screening questions were asked and patient attested that there had been no contact or symptoms    AROM C spine (in deg):   R SB: 39  L SB: 26  R Rot: 41  L Rot: 35  Flex: WFL  Ext: ~50% normal motion  Palpation: increased tissue tension R SCM, UT, LS, suboccipitals, rhomboids, global scapular and R shoulder muscles  AROM BL shoulders: WFL all directions    DGI: 22/24  Foam balance: EC romberg >30 sec min to no sway    Exercises: (No more than 4 columns)   Exercise/Equipment 2/8/22 #5 2/18/22 #6 2/21/22 #7           WARM UP         vorx1 VOR x1 w/ 2 cards VOR x1 w/ 2 cards    Table      UT stretch   2x30\" R   Scalene stretch   2x30\" R   SCM stretch   Too difficult                    PROPRIOCEPTION      Static standing Foam: romberg EO/EC, 1/2 tandem EO/EC Foam: romberg EO/EC, 1/2 tandem EO/EC    Foam strip Tandem fwd Tandem fwd    Marching EO/EC foam EO/EC foam    Cone taps Foam between 2 foot    Foam between 1 hands     Dynamic balance Head turns horizontal and vertical  Head turns  Tandem DGI   Rocker board AP/ML EO AP EO/EC, ML EO    Dual tasking Cone and ball w/ fwd/back gait    Dual cog task w/ tandem           MODALITIES                      Other Therapeutic Activities/Education:  HEP and importance of completion. Addition of Neck pain to POC    Home Exercise Program: Issued, practiced and pt demo ability to perform 1/14/2022    Manual Treatments:  none    Modalities:  none    Communication with other providers:  POC sent    Assessment:  Pt tolerated today's treatment without any adverse reactions or complications this date. Pt has shown very good progress with vestibular rehab at this time and has met all original goals that were set for her dizziness/ balance. Vestibular therapy to be done today and new script for neck pain re-evaled this date. Pt does present with reduced C spine AROM on R side due to torticollis and increased muscle tightness and tension upon palpation. Pt would continue to benefit from skilled therapy interventions to address remaining impairments, improve mobility and ROM and progress toward goal completion while reducing risk for re-injury or further decline.   End pain: same    Plan for Next Session: vestibular therapy     Time In / Time Out: 9342 - 5871     Timed Code/Total Treatment Minutes:  52': 13' NMR x1, 8' TE x1, 28' Reeval x1    Next Progress Note due:  10th visit    Plan of Care Interventions:  [x] Therapeutic Exercise  [] Modalities:  [x] Therapeutic Activity     [] Ultrasound  [] Estim  [x] Gait Training      [] Cervical Traction [] Lumbar Traction  [x] Neuromuscular Re-education    [] Cold/hotpack [] Iontophoresis   [x] Instruction in HEP      [] Vasopneumatic   [] Dry Needling    [x] Manual Therapy               [] Aquatic Therapy              Electronically signed by:  Meena Chopra PT, DPT 2/21/2022, 8:50 AM

## 2022-02-25 ENCOUNTER — HOSPITAL ENCOUNTER (OUTPATIENT)
Dept: PHYSICAL THERAPY | Age: 77
Discharge: HOME | End: 2022-02-25

## 2022-03-01 ENCOUNTER — HOSPITAL ENCOUNTER (OUTPATIENT)
Dept: PHYSICAL THERAPY | Age: 77
Setting detail: THERAPIES SERIES
Discharge: HOME | End: 2022-03-01
Payer: MEDICARE

## 2022-03-01 PROCEDURE — 97110 THERAPEUTIC EXERCISES: CPT

## 2022-03-01 PROCEDURE — 97140 MANUAL THERAPY 1/> REGIONS: CPT

## 2022-03-01 NOTE — FLOWSHEET NOTE
Outpatient Physical Therapy  [facility]           [x] Phone: [phone]   Fax: [phone]  [facility]           [] Phone: [phone]   Fax: [phone]        Physical Therapy Daily Treatment Note  Date:  3/1/2022    Patient Name:  Zak Justice    :  1945  MRN: [id number]  Restrictions/Precautions:  none  Diagnosis:   Diagnosis: Benign paroxysmal positional vertigo R ear. Neck Pain. Date of Injury/Surgery:   Treatment Diagnosis: Treatment Diagnosis: impaired balance, neck pain. Insurance/Certification information: PT Insurance Information: Medicare   Referring Physician:  Referring Practitioner: James Alberto MD  Next Doctor Visit:    Plan of care signed (Y/N):  Y  Outcome Measure: DGI: . DHI: 2   Visit# / total visits:    New POC for neck: 2/10   Pain level: 0/10     Goals:      Patient goals : reduce dizziness and off feelings  Short term goals  Time Frame for Short term goals: Refer to LTG  Long term goals  Time Frame for Long term goals : 8 visits  Long term goal 1: Pt will improve DHI to 15 or less to show subjective improvement: MET   Long term goal 2: Pt will improve DGI to 20 or more to show improved balance and reduced fall risk: MET   Long term goal 3: Pt will report overall improvement of condition by 50% or more MET   Long term goal 4: Pt will improve romberg EC on foam to 30 seconds min sway MET   LTG 5: Pt will improve L SB AROM C spine to 35 or more to aide in ADLs: New Goal   LTG 6: Pt will improve L Rot AROM C spine to 40 or more to aide in ADLs:  New Goal     Summary of Evaluation: Assessment: Pt is a 52-year-old female who presents to therapy with new onset of dizziness and some LOB starting ~2-3 weeks ago. Upon assessment, pt was negative for BL posterior and horizontal SCC BPPV. Pt did present with mild VOR impairment and moderate VSR balance impairment indicating deconditioned vestibular system, most likely R>L.  Pt would benefit from skilled therapy interventions to address listed impairments, progress toward goal completion and improve ADL/IADL status. PT also warranted to reduce risk for further injury or decline. Subjective:  Pt is doing well this date. She has been doing her new HEP. Any changes in Ambulatory Summary Sheet? None    Objective:    COVID screening questions were asked and patient attested that there had been no contact or symptoms    Reduced PROM L C spine rot due to spasticity    Exercises: (No more than 4 columns)   Exercise/Equipment 2/18/22 #6 2/21/22 #7 3/1/22 #2 (new POC)           WARM UP      UBE   2'/2'      vorx1 VOR x1 w/ 2 cards     Table      UT stretch  2x30\" R 2x30\" R   Scalene stretch  2x30\" R Modified 2x30\" R   SCM stretch  Too difficult    Chin tuck   X15, 5\"              PROPRIOCEPTION      Static standing Foam: romberg EO/EC, 1/2 tandem EO/EC     Foam strip Tandem fwd     Marching EO/EC foam     Cone taps      Dynamic balance Head turns  Tandem DGI    Rocker board AP EO/EC, ML EO     Dual tasking            MODALITIES                      Other Therapeutic Activities/Education:  HEP and importance of completion. Home Exercise Program: Issued, practiced and pt demo ability to perform 1/14/2022    Manual Treatments:  STM R SCM/scalenes/UT/LS/suboccipitals. Manual stretching into L rot and SB. Gentle distraction    Modalities:  none    Communication with other providers:  POC sent    Assessment:  Pt tolerated today's treatment without any adverse reactions or complications this date. Pt had good tolerance to added manual this date and updated stretch for more improvement with tissue tension. Pt would continue to benefit from skilled therapy interventions to address remaining impairments, improve mobility and ROM and progress toward goal completion while reducing risk for re-injury or further decline.   End pain: same    Plan for Next Session: vestibular therapy     Time In / Time Out:   1120 - 1158    Timed Code/Total Treatment Minutes: 45': 11' TE x1, 27' MT x2    Next Progress Note due:  10th visit    Plan of Care Interventions:  [x] Therapeutic Exercise  [] Modalities:  [x] Therapeutic Activity     [] Ultrasound  [] Estim  [x] Gait Training      [] Cervical Traction [] Lumbar Traction  [x] Neuromuscular Re-education    [] Cold/hotpack [] Iontophoresis   [x] Instruction in HEP      [] Vasopneumatic   [] Dry Needling    [x] Manual Therapy               [] Aquatic Therapy              Electronically signed by:  Thurmond Apgar, PT, DPT 3/1/2022, 6:43 AM

## 2022-03-04 ENCOUNTER — HOSPITAL ENCOUNTER (OUTPATIENT)
Dept: PHYSICAL THERAPY | Age: 77
Setting detail: THERAPIES SERIES
Discharge: HOME | End: 2022-03-04
Payer: MEDICARE

## 2022-03-04 PROCEDURE — 97110 THERAPEUTIC EXERCISES: CPT

## 2022-03-04 PROCEDURE — 97140 MANUAL THERAPY 1/> REGIONS: CPT

## 2022-03-04 NOTE — FLOWSHEET NOTE
Outpatient Physical Therapy  [facility]           [x] Phone: [phone]   Fax: [phone]  [facility]           [] Phone: [phone]   Fax: [phone]        Physical Therapy Daily Treatment Note  Date:  3/4/2022    Patient Name:  Salvador Grant    :  1945  MRN: [id number]  Restrictions/Precautions:  none  Diagnosis:   Diagnosis: Benign paroxysmal positional vertigo R ear. Neck Pain. Date of Injury/Surgery:   Treatment Diagnosis: Treatment Diagnosis: impaired balance, neck pain. Insurance/Certification information: PT Insurance Information: Medicare   Referring Physician:  Referring Practitioner: Sofya Dan MD  Next Doctor Visit:    Plan of care signed (Y/N):  Y  Outcome Measure: DGI: . DHI: 2   Visit# / total visits:    New POC for neck: 3/10   Pain level: 010      Goals:      Patient goals : reduce dizziness and off feelings  Short term goals  Time Frame for Short term goals: Refer to LTG  Long term goals  Time Frame for Long term goals : 8 visits  Long term goal 1: Pt will improve DHI to 15 or less to show subjective improvement: MET   Long term goal 2: Pt will improve DGI to 20 or more to show improved balance and reduced fall risk: MET   Long term goal 3: Pt will report overall improvement of condition by 50% or more MET   Long term goal 4: Pt will improve romberg EC on foam to 30 seconds min sway MET   LTG 5: Pt will improve L SB AROM C spine to 35 or more to aide in ADLs: New Goal   LTG 6: Pt will improve L Rot AROM C spine to 40 or more to aide in ADLs:  New Goal     Summary of Evaluation: Assessment: Pt is a 67-year-old female who presents to therapy with new onset of dizziness and some LOB starting ~2-3 weeks ago. Upon assessment, pt was negative for BL posterior and horizontal SCC BPPV. Pt did present with mild VOR impairment and moderate VSR balance impairment indicating deconditioned vestibular system, most likely R>L.  Pt would benefit from skilled therapy interventions to address listed impairments, progress toward goal completion and improve ADL/IADL status. PT also warranted to reduce risk for further injury or decline. Subjective:  Pt reports having no pain today and is doing well. She notes her last Botox injection was on 2/14/22. Any changes in Ambulatory Summary Sheet? None    Objective:    COVID screening questions were asked and patient attested that there had been no contact or symptoms    Continued severe tightness of R SCM  Proper form with chin tucks       Exercises: (No more than 4 columns)   Exercise/Equipment 2/21/22 #7 3/1/22 #2 (new POC) 3/4/22 #3           WARM UP      UBE  2'/2' 2'/2'      vorx1      Table      UT stretch 2x30\" R 2x30\" R 2x30\" R   Scalene stretch 2x30\" R Modified 2x30\" R Modified 2x30\" R   SCM stretch Too difficult     Chin tuck  X15, 5\" 20x5\"         STANDING      Shoulder flexion against wall    Next visit               PROPRIOCEPTION      Static standing      Foam strip      Marching      Cone taps      Dynamic balance DGI     Rocker board      Dual tasking            MODALITIES                      Other Therapeutic Activities/Education:  HEP and importance of completion. Education on torticollis and musculature involved and massage therapists 3/4/22    Home Exercise Program: Issued, practiced and pt demo ability to perform 1/14/2022    Manual Treatments:  STM R SCM/scalenes/UT/LS/suboccipitals. Manual stretching into L rot and SB. Modalities:  none    Communication with other providers:  POC sent    Assessment:  Pt tolerated treatment well today. Pt was educated on torticollis and the musculature involved and in depth how to stretch the proper muscles. Pt demo proper form on chin tucks and needed no cues for corrections.  Pt would benefit from continued skilled physical therapy to address remaining limitations of cervical ROM and flexibility in concordance with BUE strength and flexibility in order to prevent further injury and decline.  End pain: 0/10      Plan for Next Session: cervical stretches, UE strength     Time In / Time Out:   8973/1290    Timed Code/Total Treatment Minutes:   41': 15' MT x1, 26' TE x2    Next Progress Note due:  10th visit     Plan of Care Interventions:  [x] Therapeutic Exercise  [] Modalities:  [x] Therapeutic Activity     [] Ultrasound  [] Estim  [x] Gait Training      [] Cervical Traction [] Lumbar Traction  [x] Neuromuscular Re-education    [] Cold/hotpack [] Iontophoresis   [x] Instruction in HEP      [] Vasopneumatic   [] Dry Needling    [x] Manual Therapy               [] Aquatic Therapy              Electronically signed by: Treatment performed by Joe Anderson, PT, DPT assisted by  Diamond Dennison, SPT 3/4/2022, 6:53 AM

## 2022-03-08 ENCOUNTER — HOSPITAL ENCOUNTER (OUTPATIENT)
Dept: PHYSICAL THERAPY | Age: 77
Setting detail: THERAPIES SERIES
Discharge: HOME | End: 2022-03-08
Payer: MEDICARE

## 2022-03-08 PROCEDURE — 97140 MANUAL THERAPY 1/> REGIONS: CPT

## 2022-03-08 PROCEDURE — 97110 THERAPEUTIC EXERCISES: CPT

## 2022-03-08 NOTE — FLOWSHEET NOTE
Outpatient Physical Therapy  [place]           [x] Phone: [phone]   Fax: [phone]  [facility]           [] Phone: [phone]   Fax: [phone]        Physical Therapy Daily Treatment Note  Date:  3/8/2022    Patient Name:  Yosef Nettles    :  1945  MRN: [id number]  Restrictions/Precautions:  none  Diagnosis:   Diagnosis: Benign paroxysmal positional vertigo R ear. Neck Pain. Date of Injury/Surgery:   Treatment Diagnosis: Treatment Diagnosis: impaired balance, neck pain. Insurance/Certification information: PT Insurance Information: Medicare   Referring Physician:  Referring Practitioner: Coty Moss MD  Next Doctor Visit:    Plan of care signed (Y/N):  Y  Outcome Measure: DGI: . DHI: 2   Visit# / total visits:    New POC for neck: 4/10   Pain level: 0/10      Goals:      Patient goals : reduce dizziness and off feelings  Short term goals  Time Frame for Short term goals: Refer to LTG  Long term goals  Time Frame for Long term goals : 8 visits  Long term goal 1: Pt will improve DHI to 15 or less to show subjective improvement: MET   Long term goal 2: Pt will improve DGI to 20 or more to show improved balance and reduced fall risk: MET   Long term goal 3: Pt will report overall improvement of condition by 50% or more MET   Long term goal 4: Pt will improve romberg EC on foam to 30 seconds min sway MET   LTG 5: Pt will improve L SB AROM C spine to 35 or more to aide in ADLs: New Goal   LTG 6: Pt will improve L Rot AROM C spine to 40 or more to aide in ADLs:  New Goal     Summary of Evaluation: Assessment: Pt is a 80-year-old female who presents to therapy with new onset of dizziness and some LOB starting ~2-3 weeks ago. Upon assessment, pt was negative for BL posterior and horizontal SCC BPPV. Pt did present with mild VOR impairment and moderate VSR balance impairment indicating deconditioned vestibular system, most likely R>L.  Pt would benefit from skilled therapy interventions to address listed impairments, progress toward goal completion and improve ADL/IADL status. PT also warranted to reduce risk for further injury or decline. Subjective:  Pt reports that her neck pain is at its least in the morning when she first wakes up. Any changes in Ambulatory Summary Sheet? None    Objective:    COVID screening questions were asked and patient attested that there had been no contact or symptoms    -Slightly decreased R SCM tightness today       Exercises: (No more than 4 columns)   Exercise/Equipment 3/1/22 #2 (new POC) 3/4/22 #3 3/8/22 #4           WARM UP      UBE 2'/2' 2'/2' 2'/2'      vorx1      Table      UT stretch 2x30\" R 2x30\" R 2x30\" R   Scalene stretch Modified 2x30\" R Modified 2x30\" R Modified 2x30\" R   SCM stretch      Chin tuck X15, 5\" 20x5\" 20x5\"          STANDING      Shoulder flexion against wall with towel roll   Next visit  x10               PROPRIOCEPTION      Static standing      Foam strip      Marching      Cone taps      Dynamic balance      Rocker board      Dual tasking            MODALITIES                      Other Therapeutic Activities/Education:  HEP and importance of completion. Home Exercise Program: Issued, practiced and pt demo ability to perform 1/14/2022    Manual Treatments:  STM R SCM/scalenes/UT/LS/suboccipitals. .     Modalities:  none    Communication with other providers:  POC sent    Assessment:  Pt tolerated treatment well today. Pt is improving control of neck stability with coupled BUE motion. Pt would benefit from continued skilled physical therapy to address remaining limitations of cervical flexibility and strength in order to prevent further injury and decline.  End pain: 0/10      Plan for Next Session: cervical stretches, UE strength     Time In / Time Out:   0922/1000    Timed Code/Total Treatment Minutes:   38': 23' manual x2, 15' TE x1    Next Progress Note due:  10th visit     Plan of Care Interventions:  [x] Therapeutic Exercise  [] Modalities:  [x] Therapeutic Activity     [] Ultrasound  [] Estim  [x] Gait Training      [] Cervical Traction [] Lumbar Traction  [x] Neuromuscular Re-education    [] Cold/hotpack [] Iontophoresis   [x] Instruction in HEP      [] Vasopneumatic   [] Dry Needling    [x] Manual Therapy               [] Aquatic Therapy              Electronically signed by: Treatment performed by Balta Moralez PT, DPT assisted by  Massiel Rodríguez, SPT 3/8/2022, 6:55 AM

## 2022-03-10 ENCOUNTER — HOSPITAL ENCOUNTER (OUTPATIENT)
Dept: PHYSICAL THERAPY | Age: 77
Setting detail: THERAPIES SERIES
Discharge: HOME | End: 2022-03-10
Payer: MEDICARE

## 2022-03-10 PROCEDURE — 97140 MANUAL THERAPY 1/> REGIONS: CPT

## 2022-03-10 PROCEDURE — 97110 THERAPEUTIC EXERCISES: CPT

## 2022-03-10 NOTE — FLOWSHEET NOTE
Outpatient Physical Therapy  [place]           [x] Phone: [phone]   Fax: [phone]  Lucio Ambrose           [] Phone: [phone]   Fax: [phone]        Physical Therapy Daily Treatment Note  Date:  3/10/2022    Patient Name:  Hiral Miller    :  1945  MRN: [id number]  Restrictions/Precautions:  none  Diagnosis:   Diagnosis: Benign paroxysmal positional vertigo R ear. Neck Pain. Date of Injury/Surgery:   Treatment Diagnosis: Treatment Diagnosis: impaired balance, neck pain. Insurance/Certification information: PT Insurance Information: Medicare   Referring Physician:  Referring Practitioner: Lucia Montoya MD  Next Doctor Visit:    Plan of care signed (Y/N):  Y  Outcome Measure: DGI: . DHI: 2   Visit# / total visits:    New POC for neck: 5/10   Pain level: 0/10      Goals:      Patient goals : reduce dizziness and off feelings  Short term goals  Time Frame for Short term goals: Refer to LTG  Long term goals  Time Frame for Long term goals : 8 visits  Long term goal 1: Pt will improve DHI to 15 or less to show subjective improvement: MET   Long term goal 2: Pt will improve DGI to 20 or more to show improved balance and reduced fall risk: MET   Long term goal 3: Pt will report overall improvement of condition by 50% or more MET   Long term goal 4: Pt will improve romberg EC on foam to 30 seconds min sway MET   LTG 5: Pt will improve L SB AROM C spine to 35 or more to aide in ADLs: New Goal   LTG 6: Pt will improve L Rot AROM C spine to 40 or more to aide in ADLs:  New Goal     Summary of Evaluation: Assessment: Pt is a 78-year-old female who presents to therapy with new onset of dizziness and some LOB starting ~2-3 weeks ago. Upon assessment, pt was negative for BL posterior and horizontal SCC BPPV. Pt did present with mild VOR impairment and moderate VSR balance impairment indicating deconditioned vestibular system, most likely R>L.  Pt would benefit from skilled therapy interventions to address listed impairments, progress toward goal completion and improve ADL/IADL status. PT also warranted to reduce risk for further injury or decline. Subjective:  Pt reports no complaints today and is feeling good. Any changes in Ambulatory Summary Sheet? None    Objective:    COVID screening questions were asked and patient attested that there had been no contact or symptoms    Improved R SCM and UT tightness      Exercises: (No more than 4 columns)   Exercise/Equipment 3/4/22 #3 3/8/22 #4 3/10/22 #5           WARM UP      UBE 2'/2' 2'/2' 2'/2'      vorx1      Table      UT stretch 2x30\" R 2x30\" R 2x30\" R    Scalene stretch Modified 2x30\" R Modified 2x30\" R Modified 2x30\" R   SCM stretch      Chin tuck 20x5\" 20x5\"  20x5\"         STANDING      Shoulder flexion against wall with towel roll  Next visit  x10  Flex and scap x10 ea 2#              PROPRIOCEPTION      Static standing      Foam strip      Marching      Cone taps      Dynamic balance      Rocker board      Dual tasking            MODALITIES                      Other Therapeutic Activities/Education:  HEP and importance of completion. Home Exercise Program: Issued, practiced and pt demo ability to perform 1/14/2022    Manual Treatments:  STM R SCM/scalenes/UT/LS/suboccipitals. .     Modalities:  none    Communication with other providers:  POC sent    Assessment:  Pt tolerated treatment well today. Pt is improving neck stability with coupling BUE movement with added weight. Pt would benefit from continued skilled physical therapy to address remaining limitations of neck ROM and strength in order to prevent further injury and decline.  End pain: 0/10        Plan for Next Session: cervical stretches, UE strength     Time In / Time Out:   0815/0858    Timed Code/Total Treatment Minutes:   37': 23' manual x2, 20' TE x1    Next Progress Note due:  10th visit     Plan of Care Interventions:  [x] Therapeutic Exercise  [] Modalities:  [x] Therapeutic Activity     [] Ultrasound  [] Estim  [x] Gait Training      [] Cervical Traction [] Lumbar Traction  [x] Neuromuscular Re-education    [] Cold/hotpack [] Iontophoresis   [x] Instruction in HEP      [] Vasopneumatic   [] Dry Needling    [x] Manual Therapy               [] Aquatic Therapy              Electronically signed by: Treatment performed by Sierra Salmeron PT, DPT assisted by  Canary Severance, SPT 3/10/2022, 8:03 AM

## 2022-03-15 ENCOUNTER — HOSPITAL ENCOUNTER (OUTPATIENT)
Dept: PHYSICAL THERAPY | Age: 77
Setting detail: THERAPIES SERIES
Discharge: HOME | End: 2022-03-15
Payer: MEDICARE

## 2022-03-15 PROCEDURE — 97140 MANUAL THERAPY 1/> REGIONS: CPT

## 2022-03-15 PROCEDURE — 97110 THERAPEUTIC EXERCISES: CPT

## 2022-03-15 NOTE — FLOWSHEET NOTE
Outpatient Physical Therapy  [place]           [x] Phone: [phone]   Fax: [phone]  Julieth Mccarty           [] Phone: [phone]   Fax: [phone]        Physical Therapy Daily Treatment Note  Date:  3/15/2022    Patient Name:  Huma Puri    :  1945  MRN: [id number]  Restrictions/Precautions:  none  Diagnosis:   Diagnosis: Benign paroxysmal positional vertigo R ear. Neck Pain. Date of Injury/Surgery:   Treatment Diagnosis: Treatment Diagnosis: impaired balance, neck pain. Insurance/Certification information: PT Insurance Information: Medicare   Referring Physician:  Referring Practitioner: Loulou Clancy MD  Next Doctor Visit:    Plan of care signed (Y/N):  Y  Outcome Measure: DGI: . DHI: 2   Visit# / total visits:    New POC for neck: 10   Pain level: 0/10      Goals:      Patient goals : reduce dizziness and off feelings  Short term goals  Time Frame for Short term goals: Refer to LTG  Long term goals  Time Frame for Long term goals : 8 visits  Long term goal 1: Pt will improve DHI to 15 or less to show subjective improvement: MET   Long term goal 2: Pt will improve DGI to 20 or more to show improved balance and reduced fall risk: MET   Long term goal 3: Pt will report overall improvement of condition by 50% or more MET   Long term goal 4: Pt will improve romberg EC on foam to 30 seconds min sway MET   LTG 5: Pt will improve L SB AROM C spine to 35 or more to aide in ADLs: New Goal   LTG 6: Pt will improve L Rot AROM C spine to 40 or more to aide in ADLs:  New Goal     Summary of Evaluation: Assessment: Pt is a 80-year-old female who presents to therapy with new onset of dizziness and some LOB starting ~2-3 weeks ago. Upon assessment, pt was negative for BL posterior and horizontal SCC BPPV. Pt did present with mild VOR impairment and moderate VSR balance impairment indicating deconditioned vestibular system, most likely R>L.  Pt would benefit from skilled therapy interventions to address listed impairments, progress toward goal completion and improve ADL/IADL status. PT also warranted to reduce risk for further injury or decline. Subjective:  Pt reports that her neurologist will be changing and that she will try to get into a massage therapist at the beginning of April. Any changes in Ambulatory Summary Sheet? None    Objective:    COVID screening questions were asked and patient attested that there had been no contact or symptoms    Improved R SCM tightness, no trigger points       Exercises: (No more than 4 columns)   Exercise/Equipment 3/8/22 #4 3/10/22 #5 3/15/22 #6            WARM UP      UBE 2'/2' 2'/2' 2'/2'      vorx1      Table      UT stretch 2x30\" R 2x30\" R  2x30\" R    Scalene stretch Modified 2x30\" R Modified 2x30\" R Modified 2x30\" R   SCM stretch      Chin tuck 20x5\"  20x5\" 20x5\"         STANDING      Shoulder flexion against wall with towel roll  x10  Flex and scap x10 ea 2#               PROPRIOCEPTION      Static standing      Foam strip      Marching      Cone taps      Dynamic balance      Rocker board      Dual tasking            MODALITIES                      Other Therapeutic Activities/Education:  HEP and importance of completion. Home Exercise Program: Issued, practiced and pt demo ability to perform 1/14/2022    Manual Treatments:  STM R SCM/scalenes/UT/LS/suboccipitals. .     Modalities:  none    Communication with other providers:  POC sent    Assessment: Pt tolerated treatment well today. Pt is no longer having any limitations at home, will call to schedule massage therapist in a few weeks. Therapist educated pt to call new neurologist since she is due for an appt in May, due to current neurologist leaving current practice. therapsit educated pt about next visit being possible discharge date secondary to no limitations at home and based on objective findings.  Pt would benefit from continued skilled physical therapy to address remaining limitations of SCM and cervical musculature tightness in order to prevent further injury and decline.  End pain: same      Plan for Next Session: cervical stretches, UE strength possible discharge     Time In / Time Out:  0947/1022    Timed Code/Total Treatment Minutes:  41': 30' manual x2, 11' TE x1     Next Progress Note due:  10th visit     Plan of Care Interventions:  [x] Therapeutic Exercise  [] Modalities:  [x] Therapeutic Activity     [] Ultrasound  [] Estim  [x] Gait Training      [] Cervical Traction [] Lumbar Traction  [x] Neuromuscular Re-education    [] Cold/hotpack [] Iontophoresis   [x] Instruction in HEP      [] Vasopneumatic   [] Dry Needling    [x] Manual Therapy               [] Aquatic Therapy              Electronically signed by: Treatment performed by Ariel Clement, PT, DPT assisted by  Mohsen Resendez, SPT 3/15/2022, 6:51 AM

## 2022-04-25 ENCOUNTER — TELEMEDICINE (OUTPATIENT)
Dept: INTERNAL MEDICINE CLINIC | Age: 77
End: 2022-04-25
Payer: MEDICARE

## 2022-04-25 DIAGNOSIS — R05.9 COUGH: Primary | ICD-10-CM

## 2022-04-25 PROCEDURE — 4040F PNEUMOC VAC/ADMIN/RCVD: CPT | Performed by: INTERNAL MEDICINE

## 2022-04-25 PROCEDURE — 1123F ACP DISCUSS/DSCN MKR DOCD: CPT | Performed by: INTERNAL MEDICINE

## 2022-04-25 PROCEDURE — 99213 OFFICE O/P EST LOW 20 MIN: CPT | Performed by: INTERNAL MEDICINE

## 2022-04-25 PROCEDURE — 1090F PRES/ABSN URINE INCON ASSESS: CPT | Performed by: INTERNAL MEDICINE

## 2022-04-25 PROCEDURE — 3017F COLORECTAL CA SCREEN DOC REV: CPT | Performed by: INTERNAL MEDICINE

## 2022-04-25 PROCEDURE — G8399 PT W/DXA RESULTS DOCUMENT: HCPCS | Performed by: INTERNAL MEDICINE

## 2022-04-25 PROCEDURE — G8427 DOCREV CUR MEDS BY ELIG CLIN: HCPCS | Performed by: INTERNAL MEDICINE

## 2022-04-25 RX ORDER — BENZONATATE 100 MG/1
100 CAPSULE ORAL 3 TIMES DAILY PRN
Qty: 30 CAPSULE | Refills: 0 | Status: SHIPPED | OUTPATIENT
Start: 2022-04-25 | End: 2022-05-02

## 2022-04-25 NOTE — PROGRESS NOTES
2022    TELEHEALTH EVALUATION -- Audio/Visual (During ICLBD-42 public health emergency)    HPI:    Shea Griffin (:  1945) has requested an audio/video evaluation for the following concern(s):        Pt complains of cough productive of yellow, mucous, rhinorrhea productive of yellow mucous, rhinorrhea, mild HA, fatigue. She denies SOB, wheezing, ear pain, sinus pain, F/C, N/V, diarrhea, myalgias. Symptoms started 8 days ago. She has used a nebulizer with some benefit; tylenol. Review of Systems    Prior to Visit Medications    Medication Sig Taking?  Authorizing Provider   benzonatate (TESSALON) 100 MG capsule Take 1 capsule by mouth 3 times daily as needed for Cough Yes Caleb Perry MD   levothyroxine (SYNTHROID) 150 MCG tablet TAKE 1 TABLET BY MOUTH EVERY DAY Yes Caleb Perry MD   ipratropium (ATROVENT) 0.03 % nasal spray 2 sprays by Each Nostril route every 12 hours Yes Caleb Perry MD   budesonide-formoterol [facility]) 160-4.5 MCG/ACT AERO Inhale 2 puffs into the lungs every 12 hours After inhalation then gargle Yes Caleb Perry MD   ipratropium-albuterol (DUONEB) 0.5-2.5 (3) MG/3ML SOLN nebulizer solution Inhale 3 mLs into the lungs every 6 hours Yes Caleb Perry MD   metoprolol succinate (TOPROL XL) 50 MG extended release tablet TAKE 1 TABLET BY MOUTH EVERY DAY Yes Caleb Perry MD   lisinopril (PRINIVIL;ZESTRIL) 40 MG tablet TAKE 1 TABLET BY MOUTH EVERY DAY Judy Perry MD   allopurinol (ZYLOPRIM) 100 MG tablet TAKE 1 TABLET BY MOUTH EVERY DAY Yes Caleb Perry MD   Cholecalciferol (VITAMIN D3) 50 MCG ( UT) CAPS Take 2 capsules by mouth daily Yes Caleb Perry MD   spironolactone (ALDACTONE) 50 MG tablet Take 1 tablet by mouth daily Yes Caleb Perry MD   pravastatin (PRAVACHOL) 40 MG tablet TAKE 1 TABLET BY MOUTH EVERY DAY Yes Caleb Perry MD   colchicine (COLCRYS) 0.6 MG tablet Take 1 tablet by mouth 2 times daily Yes Pablo Campos MD   PROAIR RESPICLICK 684 (92 Base) MCG/ACT aerosol powder inhalation Inhale 2 puffs into the lungs every 4 hours as needed for Wheezing or Shortness of Breath  Pablo Campos MD   Naproxen Sodium (ALEVE PO) Take by mouth as needed    Historical Provider, MD       Social History     Tobacco Use    Smoking status: Never Smoker    Smokeless tobacco: Never Used    Tobacco comment: reviewed 8/23/16   Vaping Use    Vaping Use: Never used   Substance Use Topics    Alcohol use: Yes     Alcohol/week: 0.0 standard drinks     Comment: wine 2x month, rarely    Drug use: No          PHYSICAL EXAMINATION:    Constitutional: [x] Appears well-developed and well-nourished [x] No apparent distress      [] Abnormal-   Mental status  [x] Alert and awake  [x] Oriented to person/place/time [x]Able to follow commands             Psychiatric:       [x] Normal Affect [x] No Hallucinations      ASSESSMENT/PLAN:  1. Cough - check for covid, but likely another viral infx. Tx with tessalon, nebs, flonase, sinus rinse. No indication for abx      No follow-ups on file. Bob Quiroz, was evaluated through a synchronous (real-time) audio-video encounter. The patient (or guardian if applicable) is aware that this is a billable service, which includes applicable co-pays. This Virtual Visit was conducted with patient's (and/or legal guardian's) consent. The visit was conducted pursuant to the emergency declaration under the [facility], [address] [place] [address] authority and the [employer] General Act. Patient identification was verified, and a caregiver was present when appropriate. The patient was located at home in a state where the provider was licensed to provide care.       Total time spent on this encounter: Not billed by time    --Lili Carbajal MD on 4/25/2022 at 3:05 PM    An electronic signature was used to authenticate this note.

## 2022-04-28 NOTE — DISCHARGE SUMMARY
Outpatient Physical Therapy  [place]           [x] Phone: [phone]   Fax: [phone]  Fanny Pitts           [] Phone: [phone]   Fax: [phone]        Physical Therapy Daily Discharge Note  Date:  2022    Patient Name:  Ayanna Montgomery    :  1945  MRN: [id number]    Restrictions/Precautions:  none  Diagnosis:   Diagnosis: Benign paroxysmal positional vertigo R ear. Neck Pain. Date of Injury/Surgery:   Treatment Diagnosis: Treatment Diagnosis: impaired balance, neck pain. Insurance/Certification information: PT Insurance Information: Medicare   Referring Physician:  Referring Practitioner: Mony Pacheco MD  Next Doctor Visit:    Plan of care signed (Y/N):  Y  Outcome Measure: DGI: . DHI: 2   Visit# / total visits:    New POC for neck: 10   Pain level:      0/10          Goals:      Patient goals : reduce dizziness and off feelings  Short term goals  Time Frame for Short term goals: Refer to LTG  Long term goals  Time Frame for Long term goals : 8 visits  Long term goal 1: Pt will improve DHI to 15 or less to show subjective improvement: MET   Long term goal 2: Pt will improve DGI to 20 or more to show improved balance and reduced fall risk: MET   Long term goal 3: Pt will report overall improvement of condition by 50% or more MET   Long term goal 4: Pt will improve romberg EC on foam to 30 seconds min sway MET   LTG 5: Pt will improve L SB AROM C spine to 35 or more to aide in ADLs: New Goal   LTG 6: Pt will improve L Rot AROM C spine to 40 or more to aide in ADLs:  New Goal      Summary of Evaluation: Assessment: Pt is a 46-year-old female who presents to therapy with new onset of dizziness and some LOB starting ~2-3 weeks ago. Upon assessment, pt was negative for BL posterior and horizontal SCC BPPV. Pt did present with mild VOR impairment and moderate VSR balance impairment indicating deconditioned vestibular system, most likely R>L.  Pt would benefit from skilled therapy interventions to address listed impairments, progress toward goal completion and improve ADL/IADL status. PT also warranted to reduce risk for further injury or decline.          Objective:    Unable to complete an assessment of the patient and their progress towards their goals secondary to discontinuation of therapy. Celia Gavin last appointment was on 3/15/22. Pt had met all of her goals for vestibular at this time and was having no more issues with dizziness or balance. New neck goals were added but not able to be assessed secondary to pt not being present for dc. Communication with other providers:    Faxed Discharge note secondary to discontinuation of therapy sevices      Assessment:    Deshawn Christy has discontinued therapy services and at this time they will be discharged from our facility. If their is any future needs please don't hesitate to call our offices and resubmit a new therapy order. We appreciate your referral and letting us serve your patients.      Patient goals : reduce dizziness and off feelings  Short term goals  Time Frame for Short term goals: Refer to LTG  Long term goals  Time Frame for Long term goals : 8 visits  Long term goal 1: Pt will improve DHI to 15 or less to show subjective improvement: MET 2/21  Long term goal 2: Pt will improve DGI to 20 or more to show improved balance and reduced fall risk: MET 2/21  Long term goal 3: Pt will report overall improvement of condition by 50% or more MET 2/21  Long term goal 4: Pt will improve romberg EC on foam to 30 seconds min sway MET 2/21  LTG 5: Pt will improve L SB AROM C spine to 35 or more to aide in ADLs: New Goal 2/21  LTG 6: Pt will improve L Rot AROM C spine to 40 or more to aide in ADLs:  New Goal 2/21    Interventions PRN:  [x] Therapeutic Exercise  [] Modalities:  [x] Therapeutic Activity     [] Ultrasound  [] Estim  [] Gait Training      [] Cervical Traction [] Lumbar Traction  [x] Neuromuscular Re-education    [] Cold/hotpack [] Iontophoresis   [x] Instruction in HEP      [] Vasopneumatic   [] Dry Needling    [x] Manual Therapy               [] Aquatic Therapy              Electronically signed by:    Ashley Ji PT, DPT,  4/28/2022, 4:19 PM

## 2022-05-17 LAB
ALBUMIN: 4.3
CALCIUM SERPL-MCNC: 10.8 MG/DL
CREAT SERPL-MCNC: 0.79 MG/DL
MAGNESIUM: 2.1 MG/DL
PTH INTACT: 47
VITAMIN D 25-HYDROXY: 39.4
VITAMIN D2, 25 HYDROXY: NORMAL
VITAMIN D3,25 HYDROXY: NORMAL

## 2022-05-25 LAB
A/G RATIO: 2 (ref 1.2–2.2)
ALBUMIN SERPL-MCNC: 4.5 G/DL (ref 3.7–4.7)
ALP BLD-CCNC: 67 IU/L (ref 44–121)
ALT SERPL-CCNC: 18 IU/L (ref 0–32)
AMBIGUOUS ABBREVIATION: NORMAL
AMBIGUOUS ABBREVIATION: NORMAL
AST SERPL-CCNC: 18 IU/L (ref 0–40)
BASOPHILS ABSOLUTE: 0.1 X10E3/UL (ref 0–0.2)
BASOPHILS RELATIVE PERCENT: 1 %
BILIRUB SERPL-MCNC: 0.9 MG/DL (ref 0–1.2)
BUN / CREAT RATIO: 26 (ref 12–28)
BUN BLDV-MCNC: 22 MG/DL (ref 8–27)
CALCIUM SERPL-MCNC: 10.6 MG/DL (ref 8.7–10.3)
CHLORIDE BLD-SCNC: 104 MMOL/L (ref 96–106)
CHOLESTEROL, TOTAL: 195 MG/DL (ref 100–199)
CO2: 24 MMOL/L (ref 20–29)
COMMENT: ABNORMAL
CREAT SERPL-MCNC: 0.86 MG/DL (ref 0.57–1)
EOSINOPHILS ABSOLUTE: 0.4 X10E3/UL (ref 0–0.4)
EOSINOPHILS RELATIVE PERCENT: 5 %
ERYTHROCYTES, NUCLEATED/100 LEU: NORMAL
ESTIMATED GLOMERULAR FILTRATION RATE CREATININE EQUATION: 70 ML/MIN/1.73
GLOBULIN: 2.3 G/DL (ref 1.5–4.5)
GLUCOSE BLD-MCNC: 99 MG/DL (ref 65–99)
HCT VFR BLD CALC: 43.3 % (ref 34–46.6)
HDLC SERPL-MCNC: 55 MG/DL
HEMOGLOBIN: 13.7 G/DL (ref 11.1–15.9)
IMMATURE CELLS ABSOLUTE COUNT: NORMAL
IMMATURE GRANS (ABS): 0 X10E3/UL (ref 0–0.1)
IMMATURE GRANULOCYTES: 0 %
LDL CHOLESTEROL CALCULATED: 111 MG/DL (ref 0–99)
LYMPHOCYTES ABSOLUTE: 3.1 X10E3/UL (ref 0.7–3.1)
LYMPHOCYTES RELATIVE PERCENT: 38 %
MCH RBC QN AUTO: 28.4 PG (ref 26.6–33)
MCHC RBC AUTO-ENTMCNC: 31.6 G/DL (ref 31.5–35.7)
MCV RBC AUTO: 90 FL (ref 79–97)
MONOCYTES ABSOLUTE: 0.7 X10E3/UL (ref 0.1–0.9)
MONOCYTES RELATIVE PERCENT: 9 %
MORPHOLOGY: NORMAL
NEUTROPHILS ABSOLUTE: 4 X10E3/UL (ref 1.4–7)
PDW BLD-RTO: 12.1 % (ref 11.7–15.4)
PLATELET # BLD: 275 X10E3/UL (ref 150–450)
POTASSIUM SERPL-SCNC: 5.1 MMOL/L (ref 3.5–5.2)
RBC # BLD: 4.83 X10E6/UL (ref 3.77–5.28)
SEGMENTED NEUTROPHILS RELATIVE PERCENT: 47 %
SODIUM BLD-SCNC: 141 MMOL/L (ref 134–144)
TOTAL PROTEIN: 6.8 G/DL (ref 6–8.5)
TRIGL SERPL-MCNC: 168 MG/DL (ref 0–149)
TSH SERPL DL<=0.05 MIU/L-ACNC: 1.98 UIU/ML (ref 0.45–4.5)
VLDLC SERPL CALC-MCNC: 29 MG/DL (ref 5–40)
WBC # BLD: 8.2 X10E3/UL (ref 3.4–10.8)

## 2022-05-27 ENCOUNTER — OFFICE VISIT (OUTPATIENT)
Dept: INTERNAL MEDICINE CLINIC | Age: 77
End: 2022-05-27
Payer: MEDICARE

## 2022-05-27 VITALS
WEIGHT: 191.8 LBS | OXYGEN SATURATION: 98 % | BODY MASS INDEX: 36.24 KG/M2 | HEART RATE: 73 BPM | DIASTOLIC BLOOD PRESSURE: 80 MMHG | SYSTOLIC BLOOD PRESSURE: 118 MMHG

## 2022-05-27 DIAGNOSIS — J45.30 MILD PERSISTENT ASTHMA WITHOUT COMPLICATION: ICD-10-CM

## 2022-05-27 DIAGNOSIS — I10 ESSENTIAL HYPERTENSION: ICD-10-CM

## 2022-05-27 DIAGNOSIS — E21.3 HYPERPARATHYROIDISM (HCC): ICD-10-CM

## 2022-05-27 DIAGNOSIS — E03.4 HYPOTHYROIDISM DUE TO ACQUIRED ATROPHY OF THYROID: ICD-10-CM

## 2022-05-27 DIAGNOSIS — M1A.00X0 IDIOPATHIC CHRONIC GOUT WITHOUT TOPHUS, UNSPECIFIED SITE: ICD-10-CM

## 2022-05-27 DIAGNOSIS — E78.2 MIXED HYPERLIPIDEMIA: Primary | ICD-10-CM

## 2022-05-27 PROCEDURE — 1123F ACP DISCUSS/DSCN MKR DOCD: CPT | Performed by: INTERNAL MEDICINE

## 2022-05-27 PROCEDURE — G8427 DOCREV CUR MEDS BY ELIG CLIN: HCPCS | Performed by: INTERNAL MEDICINE

## 2022-05-27 PROCEDURE — 3017F COLORECTAL CA SCREEN DOC REV: CPT | Performed by: INTERNAL MEDICINE

## 2022-05-27 PROCEDURE — 1036F TOBACCO NON-USER: CPT | Performed by: INTERNAL MEDICINE

## 2022-05-27 PROCEDURE — G8399 PT W/DXA RESULTS DOCUMENT: HCPCS | Performed by: INTERNAL MEDICINE

## 2022-05-27 PROCEDURE — 1090F PRES/ABSN URINE INCON ASSESS: CPT | Performed by: INTERNAL MEDICINE

## 2022-05-27 PROCEDURE — 99214 OFFICE O/P EST MOD 30 MIN: CPT | Performed by: INTERNAL MEDICINE

## 2022-05-27 PROCEDURE — G8417 CALC BMI ABV UP PARAM F/U: HCPCS | Performed by: INTERNAL MEDICINE

## 2022-05-27 NOTE — PROGRESS NOTES
Alejandro Flahertyt  1945 05/27/22    SUBJECTIVE:      Pt was seen by endo, was recommended to have sestamibi scna and US for the hyperPTH. Pt continues on synthroid for hypothyroidism. Asthma doing well with the symbicort. She did have more cough recently when she was ill. She continues on allopurinol for gout - she has not had recurrence. The patient is taking hypertensive medications compliantly without side effects. Denies chest pain, dyspnea, edema, or TIA's. Patient denies any chest pain, shortness of breath, myalgias, Patient is tolerating cholesterol medications without difficulty. OBJECTIVE:    /80 (Site: Left Upper Arm, Position: Sitting, Cuff Size: Medium Adult)   Pulse 73   Wt 191 lb 12.8 oz (87 kg)   SpO2 98%   BMI 36.24 kg/m²     Physical Exam  Constitutional:       Appearance: She is well-developed. Eyes:      General: No scleral icterus. Conjunctiva/sclera: Conjunctivae normal.   Neck:      Thyroid: No thyromegaly. Trachea: No tracheal deviation. Cardiovascular:      Rate and Rhythm: Normal rate and regular rhythm. Heart sounds: No murmur heard. No friction rub. No gallop. Pulmonary:      Effort: No respiratory distress. Breath sounds: No wheezing or rales. Abdominal:      General: Bowel sounds are normal. There is no distension. Palpations: Abdomen is soft. There is no hepatomegaly or mass. Tenderness: There is no abdominal tenderness. There is no guarding or rebound. Musculoskeletal:      Cervical back: Neck supple. Lymphadenopathy:      Cervical: No cervical adenopathy. Skin:     Nails: There is no clubbing. Neurological:      Mental Status: She is alert and oriented to person, place, and time. Psychiatric:         Behavior: Behavior normal.         Judgment: Judgment normal.         ASSESSMENT:    1. Mixed hyperlipidemia    2. Hyperparathyroidism ([address]    3. Mild persistent asthma without complication    4. Essential hypertension    5. Idiopathic chronic gout without tophus, unspecified site    6. Hypothyroidism due to acquired atrophy of thyroid        PLAN:    Celia was seen today for 6 month follow-up and discuss labs. Diagnoses and all orders for this visit:    Mixed hyperlipidemia - reviewed labs; cont pravastatin  -     Comprehensive Metabolic Panel; Future  -     Lipid Panel; Future    Hyperparathyroidism ([facility] - await eval for endo surgery    Mild persistent asthma without complication - doing well, no change     Essential hypertension - at goal; no change in mgmt  -     Comprehensive Metabolic Panel; Future  -     Lipid Panel; Future  -     CBC with Auto Differential; Future    Idiopathic chronic gout without tophus, unspecified site - cont allopurinol    Hypothyroidism due to acquired atrophy of thyroid - at goal  -     TSH;  Future

## 2022-06-02 RX ORDER — PRAVASTATIN SODIUM 40 MG
TABLET ORAL
Qty: 90 TABLET | Refills: 3 | Status: SHIPPED | OUTPATIENT
Start: 2022-06-02

## 2022-06-17 ENCOUNTER — HOSPITAL ENCOUNTER (OUTPATIENT)
Dept: NUCLEAR MEDICINE | Age: 77
Discharge: HOME | End: 2022-06-17
Payer: MEDICARE

## 2022-06-17 ENCOUNTER — HOSPITAL ENCOUNTER (OUTPATIENT)
Dept: ULTRASOUND IMAGING | Age: 77
Discharge: HOME | End: 2022-06-17
Payer: MEDICARE

## 2022-06-17 DIAGNOSIS — E21.0 PRIMARY HYPERPARATHYROIDISM (HCC): ICD-10-CM

## 2022-06-17 PROCEDURE — 78071 PARATHYRD PLANAR W/WO SUBTRJ: CPT

## 2022-06-17 PROCEDURE — 3430000000 HC RX DIAGNOSTIC RADIOPHARMACEUTICAL: Performed by: INTERNAL MEDICINE

## 2022-06-17 PROCEDURE — A9500 TC99M SESTAMIBI: HCPCS | Performed by: INTERNAL MEDICINE

## 2022-06-17 PROCEDURE — 76536 US EXAM OF HEAD AND NECK: CPT

## 2022-06-17 RX ADMIN — KIT FOR THE PREPARATION OF TECHNETIUM TC99M SESTAMIBI 20 MILLICURIE: 1 INJECTION, POWDER, LYOPHILIZED, FOR SOLUTION PARENTERAL at 13:00

## 2022-07-20 DIAGNOSIS — I10 ESSENTIAL HYPERTENSION: ICD-10-CM

## 2022-07-20 RX ORDER — SPIRONOLACTONE 50 MG/1
50 TABLET, FILM COATED ORAL DAILY
Qty: 90 TABLET | Refills: 3 | Status: SHIPPED | OUTPATIENT
Start: 2022-07-20

## 2022-08-10 ENCOUNTER — OFFICE VISIT (OUTPATIENT)
Dept: INTERNAL MEDICINE CLINIC | Age: 77
End: 2022-08-10
Payer: MEDICARE

## 2022-08-10 ENCOUNTER — HOSPITAL ENCOUNTER (OUTPATIENT)
Dept: CT IMAGING | Age: 77
Discharge: HOME | End: 2022-08-10
Payer: MEDICARE

## 2022-08-10 VITALS
WEIGHT: 194.4 LBS | DIASTOLIC BLOOD PRESSURE: 68 MMHG | OXYGEN SATURATION: 98 % | RESPIRATION RATE: 20 BRPM | BODY MASS INDEX: 32.39 KG/M2 | SYSTOLIC BLOOD PRESSURE: 110 MMHG | HEIGHT: 65 IN | HEART RATE: 65 BPM

## 2022-08-10 DIAGNOSIS — H93.A3 PULSATILE TINNITUS OF BOTH EARS: Primary | ICD-10-CM

## 2022-08-10 DIAGNOSIS — H93.A3 PULSATILE TINNITUS OF BOTH EARS: ICD-10-CM

## 2022-08-10 LAB
GFR AFRICAN AMERICAN: >60 ML/MIN/1.73M2
GFR NON-AFRICAN AMERICAN: >60 ML/MIN/1.73M2
POC CREATININE: 0.9 MG/DL (ref 0.6–1.1)

## 2022-08-10 PROCEDURE — 3017F COLORECTAL CA SCREEN DOC REV: CPT | Performed by: NURSE PRACTITIONER

## 2022-08-10 PROCEDURE — G8427 DOCREV CUR MEDS BY ELIG CLIN: HCPCS | Performed by: NURSE PRACTITIONER

## 2022-08-10 PROCEDURE — 1123F ACP DISCUSS/DSCN MKR DOCD: CPT | Performed by: NURSE PRACTITIONER

## 2022-08-10 PROCEDURE — 1090F PRES/ABSN URINE INCON ASSESS: CPT | Performed by: NURSE PRACTITIONER

## 2022-08-10 PROCEDURE — G8399 PT W/DXA RESULTS DOCUMENT: HCPCS | Performed by: NURSE PRACTITIONER

## 2022-08-10 PROCEDURE — 1036F TOBACCO NON-USER: CPT | Performed by: NURSE PRACTITIONER

## 2022-08-10 PROCEDURE — 6360000004 HC RX CONTRAST MEDICATION: Performed by: NURSE PRACTITIONER

## 2022-08-10 PROCEDURE — 99213 OFFICE O/P EST LOW 20 MIN: CPT | Performed by: NURSE PRACTITIONER

## 2022-08-10 PROCEDURE — G8417 CALC BMI ABV UP PARAM F/U: HCPCS | Performed by: NURSE PRACTITIONER

## 2022-08-10 PROCEDURE — 70498 CT ANGIOGRAPHY NECK: CPT

## 2022-08-10 RX ADMIN — IOPAMIDOL 80 ML: 755 INJECTION, SOLUTION INTRAVENOUS at 16:46

## 2022-08-10 ASSESSMENT — ENCOUNTER SYMPTOMS
APNEA: 0
VOMITING: 0
NAUSEA: 0
COLOR CHANGE: 0
DIARRHEA: 0
SINUS PRESSURE: 0
SHORTNESS OF BREATH: 0
SINUS PAIN: 0
ABDOMINAL PAIN: 0
CHEST TIGHTNESS: 0
COUGH: 0

## 2022-08-10 NOTE — PROGRESS NOTES
Normal breath sounds. Abdominal:      General: Bowel sounds are normal.      Palpations: Abdomen is soft. Musculoskeletal:         General: Normal range of motion. Cervical back: Normal range of motion and neck supple. No edema or erythema. No muscular tenderness. Skin:     General: Skin is warm and dry. Capillary Refill: Capillary refill takes less than 2 seconds. Neurological:      Mental Status: She is alert and oriented to person, place, and time. Cranial Nerves: No cranial nerve deficit. Coordination: Coordination normal.      Deep Tendon Reflexes: Reflexes normal.      Comments: Negative José Miguel-Hallpike bilaterally   Psychiatric:         Thought Content: Thought content normal.       ASSESSMENT:    1. Pulsatile tinnitus of both ears        PLAN:    Celia was seen today for headache. Diagnoses and all orders for this visit:    Pulsatile tinnitus of both ears-etiology is not quite clear, does seem most consistent with a likely pulsatile tinnitus. Her blood pressure appears well regulated although I did encourage her to please monitor this daily at home. Neurological exam today is unremarkable however given headache, intermittent dizziness and other symptoms, I would like to evaluate the brain and carotids further. Imaging to be arranged promptly as below. If imaging benign and symptoms persist, anticipate likely ENT consult for possible Ménière's rule out.  -      [address]; Future      No flowsheet data found. Return if symptoms worsen or fail to improve. Pleas note this reports has been produced speech recognition software and may contain errors related to that system including errors in grammar, punctuation, and spelling, as well as words and phrases that may be appropriate. If there are any questions or concerns please feel free to contact the dictating provider for clarification.

## 2022-08-15 DIAGNOSIS — E03.4 HYPOTHYROIDISM DUE TO ACQUIRED ATROPHY OF THYROID: ICD-10-CM

## 2022-08-15 RX ORDER — LEVOTHYROXINE SODIUM 0.15 MG/1
TABLET ORAL
Qty: 90 TABLET | Refills: 1 | Status: SHIPPED | OUTPATIENT
Start: 2022-08-15

## 2022-08-31 RX ORDER — LISINOPRIL 40 MG/1
TABLET ORAL
Qty: 90 TABLET | Refills: 3 | Status: SHIPPED | OUTPATIENT
Start: 2022-08-31

## 2022-09-16 RX ORDER — METOPROLOL SUCCINATE 50 MG/1
TABLET, EXTENDED RELEASE ORAL
Qty: 90 TABLET | Refills: 3 | Status: SHIPPED | OUTPATIENT
Start: 2022-09-16

## 2022-10-19 RX ORDER — TIZANIDINE 4 MG/1
TABLET ORAL
Qty: 90 TABLET | Refills: 5 | Status: SHIPPED | OUTPATIENT
Start: 2022-10-19

## 2022-10-24 ENCOUNTER — TELEMEDICINE (OUTPATIENT)
Dept: INTERNAL MEDICINE CLINIC | Age: 77
End: 2022-10-24
Payer: MEDICARE

## 2022-10-24 DIAGNOSIS — J11.1 INFLUENZA: Primary | ICD-10-CM

## 2022-10-24 PROCEDURE — 99442 PR PHYS/QHP TELEPHONE EVALUATION 11-20 MIN: CPT | Performed by: INTERNAL MEDICINE

## 2022-10-24 NOTE — PROGRESS NOTES
Carmel Malhotra is a 77 y.o. female evaluated via telephone on 10/24/2022 for Cough (She has a cough that she has had for 2 days, non productive. Granddaughter tested positive for influenza and Celia was around her this past weekend. Covid at home test was negative), Fever (101.9 temperature yesterday, 101.2 temperature has taken tylenol and now thinks the fever resolved. ), Nausea & Vomiting (Nausea and vomited once last night. ), and Fatigue (Felt tired yesterday)  . Documentation:  I communicated with the patient and/or health care decision maker about cough. Details of this discussion including any medical advice provided:     Pt was with her granddaughter thursday and Friday, eventually tested (+) for flu. Pt began to feel unwell Saturday with temp to 102, dry cough, fatigue, N/V x1, myalgias, HA. She denies rhinorrhea, nasal congestion, sore throat, SOB, wheezing. She has used tylenol, Nebs. She tested (-) for covid yesterday. She is improved today. PLAN:  - likely flu, aracely with symptoms and exposure   - Tx as she has been - symptoms are improving    Total Time: minutes: 11-20 minutes    Celia Gavin was evaluated through a synchronous (real-time) audio encounter. Patient identification was verified at the start of the visit. She (or guardian if applicable) is aware that this is a billable service, which includes applicable co-pays. This visit was conducted with the patient's (and/or legal guardian's) verbal consent. She has not had a related appointment within my department in the past 7 days or scheduled within the next 24 hours. The patient was located at Home: [address] [place] [address],[address] [place] [address]. The provider was located at [facility] (Appt Dept): [address],  [address] [place] [address].     Note: not billable if this call serves to triage the patient into an appointment for the relevant concern    Keith Williamson MD

## 2022-11-30 ENCOUNTER — OFFICE VISIT (OUTPATIENT)
Dept: INTERNAL MEDICINE CLINIC | Age: 77
End: 2022-11-30
Payer: MEDICARE

## 2022-11-30 VITALS
BODY MASS INDEX: 31.28 KG/M2 | HEART RATE: 68 BPM | SYSTOLIC BLOOD PRESSURE: 112 MMHG | RESPIRATION RATE: 14 BRPM | DIASTOLIC BLOOD PRESSURE: 70 MMHG | OXYGEN SATURATION: 95 % | WEIGHT: 188 LBS

## 2022-11-30 DIAGNOSIS — E21.3 HYPERPARATHYROIDISM (HCC): ICD-10-CM

## 2022-11-30 DIAGNOSIS — E03.4 HYPOTHYROIDISM DUE TO ACQUIRED ATROPHY OF THYROID: Primary | ICD-10-CM

## 2022-11-30 DIAGNOSIS — M10.072 ACUTE IDIOPATHIC GOUT INVOLVING TOE OF LEFT FOOT: ICD-10-CM

## 2022-11-30 DIAGNOSIS — G24.3 TORTICOLLIS, SPASMODIC: ICD-10-CM

## 2022-11-30 DIAGNOSIS — I10 ESSENTIAL HYPERTENSION: ICD-10-CM

## 2022-11-30 DIAGNOSIS — E78.2 MIXED HYPERLIPIDEMIA: ICD-10-CM

## 2022-11-30 PROCEDURE — 1123F ACP DISCUSS/DSCN MKR DOCD: CPT | Performed by: INTERNAL MEDICINE

## 2022-11-30 PROCEDURE — G8484 FLU IMMUNIZE NO ADMIN: HCPCS | Performed by: INTERNAL MEDICINE

## 2022-11-30 PROCEDURE — 99214 OFFICE O/P EST MOD 30 MIN: CPT | Performed by: INTERNAL MEDICINE

## 2022-11-30 PROCEDURE — 1036F TOBACCO NON-USER: CPT | Performed by: INTERNAL MEDICINE

## 2022-11-30 PROCEDURE — 1090F PRES/ABSN URINE INCON ASSESS: CPT | Performed by: INTERNAL MEDICINE

## 2022-11-30 PROCEDURE — G8417 CALC BMI ABV UP PARAM F/U: HCPCS | Performed by: INTERNAL MEDICINE

## 2022-11-30 PROCEDURE — 3078F DIAST BP <80 MM HG: CPT | Performed by: INTERNAL MEDICINE

## 2022-11-30 PROCEDURE — G8427 DOCREV CUR MEDS BY ELIG CLIN: HCPCS | Performed by: INTERNAL MEDICINE

## 2022-11-30 PROCEDURE — 3074F SYST BP LT 130 MM HG: CPT | Performed by: INTERNAL MEDICINE

## 2022-11-30 PROCEDURE — G8399 PT W/DXA RESULTS DOCUMENT: HCPCS | Performed by: INTERNAL MEDICINE

## 2022-11-30 RX ORDER — ALLOPURINOL 100 MG/1
TABLET ORAL
Qty: 90 TABLET | Refills: 3 | Status: SHIPPED | OUTPATIENT
Start: 2022-11-30

## 2022-11-30 NOTE — PROGRESS NOTES
Celia ASTUDILLO Profitt  1945 11/30/22    SUBJECTIVE:    Pt with torticullis for which she has been on phenol in the past, now on botox at [facility]. She is interested in seeing a neurologist locally. She continues to have hypercalcemia, was sen at [facility] yesterday and will continue with watchful waiting. The patient is taking hypertensive medications compliantly without side effects. Denies chest pain, dyspnea, edema, or TIA's. Patient denies any chest pain, shortness of breath, myalgias, Patient is tolerating cholesterol medications without difficulty. Pt continues on synthroid for hypothyroidism     She has had no recent episodes of gout. She continues on allopurinol. OBJECTIVE:    /70   Pulse 68   Resp 14   Wt 188 lb (85.3 kg)   SpO2 95%   BMI 31.28 kg/m²     Physical Exam  Constitutional:       Appearance: She is well-developed. Eyes:      General: No scleral icterus. Conjunctiva/sclera: Conjunctivae normal.   Neck:      Thyroid: No thyromegaly. Trachea: No tracheal deviation. Cardiovascular:      Rate and Rhythm: Normal rate and regular rhythm. Heart sounds: No murmur heard. No friction rub. No gallop. Pulmonary:      Effort: No respiratory distress. Breath sounds: No wheezing or rales. Abdominal:      General: Bowel sounds are normal. There is no distension. Palpations: Abdomen is soft. There is no hepatomegaly or mass. Tenderness: There is no abdominal tenderness. There is no guarding or rebound. Musculoskeletal:      Cervical back: Neck supple. Lymphadenopathy:      Cervical: No cervical adenopathy. Skin:     Nails: There is no clubbing. Neurological:      Mental Status: She is alert and oriented to person, place, and time. Psychiatric:         Behavior: Behavior normal.         Judgment: Judgment normal.       ASSESSMENT:    1. Hypothyroidism due to acquired atrophy of thyroid    2.  Acute idiopathic gout involving toe of left foot 3. [facility] ([facility]    4. Essential hypertension    5. Mixed hyperlipidemia    6. Torticollis, spasmodic        PLAN:    Maroi Cervantes was seen today for 6 month follow-up, discuss labs and referral - general.    Diagnoses and all orders for this visit:    Hypothyroidism due to acquired atrophy of thyroid - check labs  -     TSH; Future    Acute idiopathic gout involving toe of left foot - cont allopurinol  -     allopurinol (ZYLOPRIM) 100 MG tablet; TAKE 1 TABLET BY MOUTH EVERY DAY    Hyperparathyroidism ([facility] - discussed recent endo appt; defer surgery for now    Essential hypertension - at goal; check labs  -     CBC with Auto Differential; Future  -     Comprehensive Metabolic Panel; Future    Mixed hyperlipidemia - check labs; cont pravastatin  -     CBC with Auto Differential; Future  -     Comprehensive Metabolic Panel; Future  -     Lipid Panel;  Future    Torticollis, spasmodic - will refer to neuro  -     External Referral To Neurology

## 2022-12-21 DIAGNOSIS — J45.991 COUGH VARIANT ASTHMA: ICD-10-CM

## 2022-12-21 RX ORDER — BUDESONIDE AND FORMOTEROL FUMARATE DIHYDRATE 160; 4.5 UG/1; UG/1
AEROSOL RESPIRATORY (INHALATION)
Qty: 30.6 G | Refills: 3 | Status: SHIPPED | OUTPATIENT
Start: 2022-12-21

## 2023-02-16 ENCOUNTER — OFFICE VISIT (OUTPATIENT)
Dept: INTERNAL MEDICINE CLINIC | Age: 78
End: 2023-02-16
Payer: MEDICARE

## 2023-02-16 VITALS
RESPIRATION RATE: 18 BRPM | WEIGHT: 180 LBS | HEART RATE: 98 BPM | BODY MASS INDEX: 29.95 KG/M2 | DIASTOLIC BLOOD PRESSURE: 64 MMHG | OXYGEN SATURATION: 97 % | SYSTOLIC BLOOD PRESSURE: 108 MMHG

## 2023-02-16 DIAGNOSIS — R10.9 ABDOMINAL DISCOMFORT: Primary | ICD-10-CM

## 2023-02-16 DIAGNOSIS — E03.4 HYPOTHYROIDISM DUE TO ACQUIRED ATROPHY OF THYROID: ICD-10-CM

## 2023-02-16 DIAGNOSIS — J45.30 MILD PERSISTENT ASTHMA WITHOUT COMPLICATION: ICD-10-CM

## 2023-02-16 DIAGNOSIS — E21.3 HYPERPARATHYROIDISM (HCC): ICD-10-CM

## 2023-02-16 DIAGNOSIS — I10 ESSENTIAL HYPERTENSION: ICD-10-CM

## 2023-02-16 DIAGNOSIS — G24.3 TORTICOLLIS, SPASMODIC: ICD-10-CM

## 2023-02-16 DIAGNOSIS — E78.2 MIXED HYPERLIPIDEMIA: ICD-10-CM

## 2023-02-16 PROCEDURE — G8427 DOCREV CUR MEDS BY ELIG CLIN: HCPCS | Performed by: INTERNAL MEDICINE

## 2023-02-16 PROCEDURE — 1036F TOBACCO NON-USER: CPT | Performed by: INTERNAL MEDICINE

## 2023-02-16 PROCEDURE — 3074F SYST BP LT 130 MM HG: CPT | Performed by: INTERNAL MEDICINE

## 2023-02-16 PROCEDURE — G8484 FLU IMMUNIZE NO ADMIN: HCPCS | Performed by: INTERNAL MEDICINE

## 2023-02-16 PROCEDURE — 1090F PRES/ABSN URINE INCON ASSESS: CPT | Performed by: INTERNAL MEDICINE

## 2023-02-16 PROCEDURE — 3078F DIAST BP <80 MM HG: CPT | Performed by: INTERNAL MEDICINE

## 2023-02-16 PROCEDURE — 1123F ACP DISCUSS/DSCN MKR DOCD: CPT | Performed by: INTERNAL MEDICINE

## 2023-02-16 PROCEDURE — G8417 CALC BMI ABV UP PARAM F/U: HCPCS | Performed by: INTERNAL MEDICINE

## 2023-02-16 PROCEDURE — 99214 OFFICE O/P EST MOD 30 MIN: CPT | Performed by: INTERNAL MEDICINE

## 2023-02-16 PROCEDURE — G8399 PT W/DXA RESULTS DOCUMENT: HCPCS | Performed by: INTERNAL MEDICINE

## 2023-02-16 RX ORDER — CINACALCET 30 MG/1
30 TABLET, FILM COATED ORAL DAILY
COMMUNITY
Start: 2023-01-16

## 2023-02-16 SDOH — ECONOMIC STABILITY: INCOME INSECURITY: HOW HARD IS IT FOR YOU TO PAY FOR THE VERY BASICS LIKE FOOD, HOUSING, MEDICAL CARE, AND HEATING?: NOT HARD AT ALL

## 2023-02-16 SDOH — ECONOMIC STABILITY: FOOD INSECURITY: WITHIN THE PAST 12 MONTHS, YOU WORRIED THAT YOUR FOOD WOULD RUN OUT BEFORE YOU GOT MONEY TO BUY MORE.: NEVER TRUE

## 2023-02-16 SDOH — ECONOMIC STABILITY: FOOD INSECURITY: WITHIN THE PAST 12 MONTHS, THE FOOD YOU BOUGHT JUST DIDN'T LAST AND YOU DIDN'T HAVE MONEY TO GET MORE.: NEVER TRUE

## 2023-02-16 SDOH — ECONOMIC STABILITY: HOUSING INSECURITY
IN THE LAST 12 MONTHS, WAS THERE A TIME WHEN YOU DID NOT HAVE A STEADY PLACE TO SLEEP OR SLEPT IN A SHELTER (INCLUDING NOW)?: NO

## 2023-02-16 ASSESSMENT — PATIENT HEALTH QUESTIONNAIRE - PHQ9
SUM OF ALL RESPONSES TO PHQ QUESTIONS 1-9: 0
1. LITTLE INTEREST OR PLEASURE IN DOING THINGS: 0
2. FEELING DOWN, DEPRESSED OR HOPELESS: 0
SUM OF ALL RESPONSES TO PHQ QUESTIONS 1-9: 0
SUM OF ALL RESPONSES TO PHQ QUESTIONS 1-9: 0
SUM OF ALL RESPONSES TO PHQ9 QUESTIONS 1 & 2: 0
SUM OF ALL RESPONSES TO PHQ QUESTIONS 1-9: 0

## 2023-02-16 NOTE — PROGRESS NOTES
[facility]  1945 02/16/23    SUBJECTIVE:      \"I feel like my gut is messed up in some way. \" She has had 3 weeks of nausea without vomiting, decreased appetite, bloating sensation, intermittent diarrhea and constipation. \" Symptoms started around the time she started the sensipar. She is seeing a new neuro about the torticullis. She no longer drives because of the torticullis. The patient is taking hypertensive medications compliantly without side effects. Denies chest pain, dyspnea, edema, or TIA's. Patient denies any chest pain, shortness of breath, myalgias, Patient is tolerating cholesterol medications without difficulty. Pt continues on synthroid for hypothyroidism. She has had no episodes of gout. Asthma doing well with symbicort. OBJECTIVE:    /64   Pulse 98   Resp 18   Wt 180 lb (81.6 kg)   SpO2 97%   BMI 29.95 kg/m²     Physical Exam  Constitutional:       Appearance: She is well-developed. Eyes:      General: No scleral icterus. Conjunctiva/sclera: Conjunctivae normal.   Neck:      Thyroid: No thyromegaly. Trachea: No tracheal deviation. Cardiovascular:      Rate and Rhythm: Normal rate and regular rhythm. Heart sounds: No murmur heard. No friction rub. No gallop. Pulmonary:      Effort: No respiratory distress. Breath sounds: No wheezing or rales. Abdominal:      General: Bowel sounds are normal. There is no distension. Palpations: Abdomen is soft. There is no hepatomegaly or mass. Tenderness: There is no abdominal tenderness. There is no guarding or rebound. Musculoskeletal:      Cervical back: Neck supple. Lymphadenopathy:      Cervical: No cervical adenopathy. Skin:     Nails: There is no clubbing. Neurological:      Mental Status: She is alert and oriented to person, place, and time. Psychiatric:         Behavior: Behavior normal.         Judgment: Judgment normal.       ASSESSMENT:    1.  Abdominal discomfort    2. Hyperparathyroidism ([address]    3. Torticollis, spasmodic    4. Mixed hyperlipidemia    5. Mild persistent asthma without complication    6. Essential hypertension    7. Hypothyroidism due to acquired atrophy of thyroid        PLAN:    Celia was seen today for nausea, abdominal pain, constipation, discuss medications and other. Diagnoses and all orders for this visit:    Abdominal discomfort - am (-), and symptoms fairly mild. I an concerned this is from the sensipar. After she has he labs drawn she will stp the med for a week to determine if the symptoms resolve. If so, no further eval. If not, pt will call and I will check CT abd no contrast    Hyperparathyroidism ([address] - as above    Torticollis, spasmodic - seeing neuro, briefly discussed surgery as symptoms are worsening    Mixed hyperlipidemia - cont statin, check labs  -     Comprehensive Metabolic Panel; Future  -     Lipid Panel; Future    Mild persistent asthma without complication - cont inhaler    Essential hypertension - at goal; check labs  -     Comprehensive Metabolic Panel; Future  -     CBC with Auto Differential; Future  -     Lipid Panel; Future    Hypothyroidism due to acquired atrophy of thyroid - check TSH  -     TSH;  Future

## 2023-02-21 LAB
A/G RATIO: 1.5 (ref 1.2–2.2)
ALBUMIN SERPL-MCNC: 4.4 G/DL (ref 3.7–4.7)
ALP BLD-CCNC: 70 IU/L (ref 44–121)
ALT SERPL-CCNC: 12 IU/L (ref 0–32)
AMBIGUOUS ABBREVIATION: NORMAL
AMBIGUOUS ABBREVIATION: NORMAL
AST SERPL-CCNC: 16 IU/L (ref 0–40)
BASOPHILS ABSOLUTE: 0.1 X10E3/UL (ref 0–0.2)
BASOPHILS RELATIVE PERCENT: 1 %
BILIRUB SERPL-MCNC: 0.6 MG/DL (ref 0–1.2)
BUN / CREAT RATIO: 26 (ref 12–28)
BUN BLDV-MCNC: 39 MG/DL (ref 8–27)
CALCIUM SERPL-MCNC: 9.2 MG/DL (ref 8.7–10.3)
CHLORIDE BLD-SCNC: 104 MMOL/L (ref 96–106)
CHOLESTEROL, TOTAL: 199 MG/DL (ref 100–199)
CO2: 23 MMOL/L (ref 20–29)
COMMENT: ABNORMAL
CREAT SERPL-MCNC: 1.51 MG/DL (ref 0.57–1)
EOSINOPHILS ABSOLUTE: 0.4 X10E3/UL (ref 0–0.4)
EOSINOPHILS RELATIVE PERCENT: 5 %
ERYTHROCYTES, NUCLEATED/100 LEU: NORMAL
ESTIMATED GLOMERULAR FILTRATION RATE CREATININE EQUATION: 36 ML/MIN/1.73
GLOBULIN: 2.9 G/DL (ref 1.5–4.5)
GLUCOSE BLD-MCNC: 89 MG/DL (ref 70–99)
HCT VFR BLD CALC: 40.2 % (ref 34–46.6)
HDLC SERPL-MCNC: 52 MG/DL
HEMOGLOBIN: 13.5 G/DL (ref 11.1–15.9)
IMMATURE CELLS ABSOLUTE COUNT: NORMAL
IMMATURE GRANS (ABS): 0 X10E3/UL (ref 0–0.1)
IMMATURE GRANULOCYTES: 0 %
LDL CHOLESTEROL CALCULATED: 118 MG/DL (ref 0–99)
LYMPHOCYTES ABSOLUTE: 2.3 X10E3/UL (ref 0.7–3.1)
LYMPHOCYTES RELATIVE PERCENT: 27 %
MCH RBC QN AUTO: 29.5 PG (ref 26.6–33)
MCHC RBC AUTO-ENTMCNC: 33.6 G/DL (ref 31.5–35.7)
MCV RBC AUTO: 88 FL (ref 79–97)
MONOCYTES ABSOLUTE: 0.6 X10E3/UL (ref 0.1–0.9)
MONOCYTES RELATIVE PERCENT: 8 %
MORPHOLOGY: NORMAL
NEUTROPHILS ABSOLUTE: 5.1 X10E3/UL (ref 1.4–7)
PDW BLD-RTO: 11.7 % (ref 11.7–15.4)
PLATELET # BLD: 361 X10E3/UL (ref 150–450)
POTASSIUM SERPL-SCNC: 5.3 MMOL/L (ref 3.5–5.2)
RBC # BLD: 4.58 X10E6/UL (ref 3.77–5.28)
SEGMENTED NEUTROPHILS RELATIVE PERCENT: 59 %
SODIUM BLD-SCNC: 142 MMOL/L (ref 134–144)
TOTAL PROTEIN: 7.3 G/DL (ref 6–8.5)
TRIGL SERPL-MCNC: 167 MG/DL (ref 0–149)
TSH SERPL DL<=0.05 MIU/L-ACNC: 2.17 UIU/ML (ref 0.45–4.5)
VLDLC SERPL CALC-MCNC: 29 MG/DL (ref 5–40)
WBC # BLD: 8.4 X10E3/UL (ref 3.4–10.8)

## 2023-02-22 DIAGNOSIS — R74.8 ELEVATED CREATINE KINASE: Primary | ICD-10-CM

## 2023-02-27 ENCOUNTER — TELEPHONE (OUTPATIENT)
Dept: INTERNAL MEDICINE CLINIC | Age: 78
End: 2023-02-27

## 2023-02-27 DIAGNOSIS — R10.84 GENERALIZED ABDOMINAL PAIN: Primary | ICD-10-CM

## 2023-02-27 NOTE — TELEPHONE ENCOUNTER
The patient called in stating that you had her stop a medication and stated that she is doing a little better but still having diarrhea and not feeling like her self. She said she is heading to get her blood work done but stated that she was told if she was not doing better after stopping the med to let you know. She said she believe you may order imaging . Please advise !

## 2023-02-28 LAB
AMBIGUOUS ABBREVIATION: NORMAL
BUN / CREAT RATIO: 23 (ref 12–28)
BUN BLDV-MCNC: 44 MG/DL (ref 8–27)
CALCIUM SERPL-MCNC: 9.8 MG/DL (ref 8.7–10.3)
CHLORIDE BLD-SCNC: 104 MMOL/L (ref 96–106)
CO2: 21 MMOL/L (ref 20–29)
CREAT SERPL-MCNC: 1.88 MG/DL (ref 0.57–1)
ESTIMATED GLOMERULAR FILTRATION RATE CREATININE EQUATION: 27 ML/MIN/1.73
GLUCOSE BLD-MCNC: 96 MG/DL (ref 70–99)
POTASSIUM SERPL-SCNC: 6 MMOL/L (ref 3.5–5.2)
SODIUM BLD-SCNC: 140 MMOL/L (ref 134–144)

## 2023-03-01 DIAGNOSIS — R94.4 ABNORMAL RENAL FUNCTION TEST: ICD-10-CM

## 2023-03-01 DIAGNOSIS — N28.9 ABNORMAL KIDNEY FUNCTION: Primary | ICD-10-CM

## 2023-03-02 ENCOUNTER — HOSPITAL ENCOUNTER (OUTPATIENT)
Dept: CT IMAGING | Age: 78
Discharge: HOME | End: 2023-03-02
Payer: MEDICARE

## 2023-03-02 DIAGNOSIS — R10.84 GENERALIZED ABDOMINAL PAIN: ICD-10-CM

## 2023-03-02 PROCEDURE — 2500000003 HC RX 250 WO HCPCS: Performed by: INTERNAL MEDICINE

## 2023-03-02 PROCEDURE — G1010 CDSM STANSON: HCPCS

## 2023-03-02 RX ADMIN — BARIUM SULFATE 900 ML: 20 SUSPENSION ORAL at 11:45

## 2023-03-07 ENCOUNTER — HOSPITAL ENCOUNTER (OUTPATIENT)
Dept: ULTRASOUND IMAGING | Age: 78
Discharge: HOME | End: 2023-03-07
Payer: MEDICARE

## 2023-03-07 ENCOUNTER — HOSPITAL ENCOUNTER (OUTPATIENT)
Age: 78
Discharge: HOME | End: 2023-03-07
Payer: MEDICARE

## 2023-03-07 DIAGNOSIS — R94.4 ABNORMAL RENAL FUNCTION TEST: ICD-10-CM

## 2023-03-07 LAB
ANION GAP SERPL CALCULATED.3IONS-SCNC: 12 MMOL/L (ref 4–16)
BACTERIA: NEGATIVE /HPF
BILIRUBIN URINE: NEGATIVE MG/DL
BLOOD, URINE: ABNORMAL
BUN SERPL-MCNC: 32 MG/DL (ref 6–23)
CALCIUM SERPL-MCNC: 9.6 MG/DL (ref 8.3–10.6)
CHLORIDE BLD-SCNC: 94 MMOL/L (ref 99–110)
CLARITY: CLEAR
CO2: 26 MMOL/L (ref 21–32)
COLOR: YELLOW
CREAT SERPL-MCNC: 1.4 MG/DL (ref 0.6–1.1)
GFR SERPL CREATININE-BSD FRML MDRD: 39 ML/MIN/1.73M2
GLUCOSE SERPL-MCNC: 94 MG/DL (ref 70–99)
GLUCOSE, URINE: NEGATIVE MG/DL
KETONES, URINE: NEGATIVE MG/DL
LEUKOCYTE ESTERASE, URINE: ABNORMAL
MUCUS: ABNORMAL HPF
NITRITE URINE, QUANTITATIVE: NEGATIVE
PH, URINE: 6 (ref 5–8)
POTASSIUM SERPL-SCNC: 4.8 MMOL/L (ref 3.5–5.1)
PROTEIN UA: NEGATIVE MG/DL
RBC URINE: 2 /HPF (ref 0–6)
SODIUM BLD-SCNC: 132 MMOL/L (ref 135–145)
SPECIFIC GRAVITY UA: <1.005 (ref 1–1.03)
SQUAMOUS EPITHELIAL: 2 /HPF
TRICHOMONAS: ABNORMAL /HPF
UROBILINOGEN, URINE: 0.2 MG/DL (ref 0.2–1)
WBC UA: 3 /HPF (ref 0–5)

## 2023-03-07 PROCEDURE — 76770 US EXAM ABDO BACK WALL COMP: CPT

## 2023-03-07 PROCEDURE — 80048 BASIC METABOLIC PNL TOTAL CA: CPT

## 2023-03-07 PROCEDURE — 81001 URINALYSIS AUTO W/SCOPE: CPT

## 2023-03-07 PROCEDURE — 36415 COLL VENOUS BLD VENIPUNCTURE: CPT

## 2023-03-08 ENCOUNTER — OFFICE VISIT (OUTPATIENT)
Dept: INTERNAL MEDICINE CLINIC | Age: 78
End: 2023-03-08
Payer: MEDICARE

## 2023-03-08 VITALS
BODY MASS INDEX: 28.99 KG/M2 | WEIGHT: 174.2 LBS | DIASTOLIC BLOOD PRESSURE: 80 MMHG | SYSTOLIC BLOOD PRESSURE: 124 MMHG | RESPIRATION RATE: 14 BRPM | HEART RATE: 60 BPM | OXYGEN SATURATION: 92 %

## 2023-03-08 DIAGNOSIS — N17.9 AKI (ACUTE KIDNEY INJURY) (HCC): Primary | ICD-10-CM

## 2023-03-08 PROCEDURE — 3079F DIAST BP 80-89 MM HG: CPT | Performed by: INTERNAL MEDICINE

## 2023-03-08 PROCEDURE — G8484 FLU IMMUNIZE NO ADMIN: HCPCS | Performed by: INTERNAL MEDICINE

## 2023-03-08 PROCEDURE — 1036F TOBACCO NON-USER: CPT | Performed by: INTERNAL MEDICINE

## 2023-03-08 PROCEDURE — G8399 PT W/DXA RESULTS DOCUMENT: HCPCS | Performed by: INTERNAL MEDICINE

## 2023-03-08 PROCEDURE — 1123F ACP DISCUSS/DSCN MKR DOCD: CPT | Performed by: INTERNAL MEDICINE

## 2023-03-08 PROCEDURE — G8427 DOCREV CUR MEDS BY ELIG CLIN: HCPCS | Performed by: INTERNAL MEDICINE

## 2023-03-08 PROCEDURE — 99213 OFFICE O/P EST LOW 20 MIN: CPT | Performed by: INTERNAL MEDICINE

## 2023-03-08 PROCEDURE — 3074F SYST BP LT 130 MM HG: CPT | Performed by: INTERNAL MEDICINE

## 2023-03-08 PROCEDURE — 1090F PRES/ABSN URINE INCON ASSESS: CPT | Performed by: INTERNAL MEDICINE

## 2023-03-08 PROCEDURE — G8417 CALC BMI ABV UP PARAM F/U: HCPCS | Performed by: INTERNAL MEDICINE

## 2023-03-08 NOTE — PROGRESS NOTES
Sony Martinez Profitt  1945 03/08/23    SUBJECTIVE:      Creatinine was 0.86 in 5/22. It was then 1.51 2/20/23, then 1.88 on 2/27/23, then 1.4 on 3/7/23. Renal US was WNL. The sensipar was causing nausea, but this has improved. She denies any NSAIDs. She has good urine output and good PO fluid intake. OBJECTIVE:    /80   Pulse 60   Resp 14   Wt 174 lb 3.2 oz (79 kg)   SpO2 92%   BMI 28.99 kg/m²     Physical Exam    ASSESSMENT:    1. KIKE (acute kidney injury) ([facility]        PLAN:    Bailey Reed was seen today for results. Diagnoses and all orders for this visit:    KIKE (acute kidney injury) ([facility] - last labs improved. I wonder if nausea form the sensipar caused intravascular depletion. She is drinking better, so will recheck labs 2 weeks. I will check urine studies as well - AIN could cause the KIKE as well. If renal function is not significanty improved will consider referral to nephrology  -     Basic Metabolic Panel; Future  -     Urinalysis; Future  -     Eosinophil Smear, Urine; Future  -     Protein / Creatinine Ratio, Urine;  Future  -     CBC with Auto Differential; Future

## 2023-03-17 DIAGNOSIS — E03.4 HYPOTHYROIDISM DUE TO ACQUIRED ATROPHY OF THYROID: ICD-10-CM

## 2023-03-17 RX ORDER — LEVOTHYROXINE SODIUM 0.15 MG/1
TABLET ORAL
Qty: 90 TABLET | Refills: 1 | Status: SHIPPED | OUTPATIENT
Start: 2023-03-17

## 2023-03-22 ENCOUNTER — HOSPITAL ENCOUNTER (OUTPATIENT)
Age: 78
Discharge: HOME | End: 2023-03-22
Payer: MEDICARE

## 2023-03-22 DIAGNOSIS — N17.9 AKI (ACUTE KIDNEY INJURY) (HCC): ICD-10-CM

## 2023-03-22 LAB — EOSINOPHIL, URINE: NEGATIVE /HPF

## 2023-03-22 PROCEDURE — 87205 SMEAR GRAM STAIN: CPT

## 2023-03-23 LAB
BACTERIA: ABNORMAL
BASOPHILS ABSOLUTE: 0.1 X10E3/UL (ref 0–0.2)
BASOPHILS RELATIVE PERCENT: 1 %
BILIRUBIN URINE: NEGATIVE
BLOOD, URINE: ABNORMAL
BUN / CREAT RATIO: 18 (ref 12–28)
BUN BLDV-MCNC: 20 MG/DL (ref 8–27)
CALCIUM SERPL-MCNC: 8.5 MG/DL (ref 8.7–10.3)
CASTS: ABNORMAL
CASTS: ABNORMAL /LPF
CHLORIDE BLD-SCNC: 103 MMOL/L (ref 96–106)
CLARITY: CLEAR
CO2: 24 MMOL/L (ref 20–29)
COLOR: YELLOW
CREAT SERPL-MCNC: 1.12 MG/DL (ref 0.57–1)
CREATININE URINE: 81.9 MG/DL
CRYSTALS, UA: ABNORMAL
CRYSTALS: ABNORMAL
EOSINOPHILS ABSOLUTE: 0.5 X10E3/UL (ref 0–0.4)
EOSINOPHILS RELATIVE PERCENT: 6 %
EPITHELIAL CELLS, UA: ABNORMAL /HPF (ref 0–10)
ERYTHROCYTES, NUCLEATED/100 LEU: ABNORMAL
ESTIMATED GLOMERULAR FILTRATION RATE CREATININE EQUATION: 51 ML/MIN/1.73
GLUCOSE BLD-MCNC: 94 MG/DL (ref 70–99)
GLUCOSE URINE: NEGATIVE
HCT VFR BLD CALC: 38.6 % (ref 34–46.6)
HEMOGLOBIN: 12.6 G/DL (ref 11.1–15.9)
IMMATURE CELLS ABSOLUTE COUNT: ABNORMAL
IMMATURE GRANS (ABS): 0 X10E3/UL (ref 0–0.1)
IMMATURE GRANULOCYTES: 0 %
KETONES, URINE: NEGATIVE
LEUKOCYTE ESTERASE, URINE: ABNORMAL
LEUKOCYTES, UA: ABNORMAL /HPF (ref 0–5)
LYMPHOCYTES ABSOLUTE: 2.6 X10E3/UL (ref 0.7–3.1)
LYMPHOCYTES RELATIVE PERCENT: 29 %
MCH RBC QN AUTO: 29 PG (ref 26.6–33)
MCHC RBC AUTO-ENTMCNC: 32.6 G/DL (ref 31.5–35.7)
MCV RBC AUTO: 89 FL (ref 79–97)
MICROSCOPIC EXAMINATION: ABNORMAL
MICROSCOPIC EXAMINATION: ABNORMAL
MONOCYTES ABSOLUTE: 0.6 X10E3/UL (ref 0.1–0.9)
MONOCYTES RELATIVE PERCENT: 7 %
MORPHOLOGY: ABNORMAL
MUCUS: ABNORMAL
NEUTROPHILS ABSOLUTE: 5.1 X10E3/UL (ref 1.4–7)
NITRITE, URINE: NEGATIVE
PDW BLD-RTO: 12.5 % (ref 11.7–15.4)
PH UA: 6 (ref 5–7.5)
PLATELET # BLD: 266 X10E3/UL (ref 150–450)
POTASSIUM SERPL-SCNC: 4.8 MMOL/L (ref 3.5–5.2)
PROTEIN UA: NEGATIVE
PROTEIN, URINE, RANDOM: 9.2 MG/DL
PROTEIN/CREAT RATIO URINE RAN: 112 MG/G CREAT (ref 0–200)
RBC # BLD: 4.34 X10E6/UL (ref 3.77–5.28)
RBC UA: ABNORMAL /HPF (ref 0–2)
RENAL EPITHELIAL, POC: ABNORMAL
SEGMENTED NEUTROPHILS RELATIVE PERCENT: 57 %
SODIUM BLD-SCNC: 142 MMOL/L (ref 134–144)
SPECIFIC GRAVITY UA: 1.01 (ref 1–1.03)
TRICHOMONAS VAG: ABNORMAL
URINE SEDIMENT COMMENTS: ABNORMAL
UROBILINOGEN, URINE: 0.2 MG/DL (ref 0.2–1)
WBC # BLD: 8.9 X10E3/UL (ref 3.4–10.8)
YEAST: ABNORMAL

## 2023-03-24 ENCOUNTER — HOSPITAL ENCOUNTER (OUTPATIENT)
Age: 78
Discharge: HOME | End: 2023-03-24
Payer: MEDICARE

## 2023-03-24 DIAGNOSIS — R82.998 URINE WHITE BLOOD CELLS INCREASED: Primary | ICD-10-CM

## 2023-03-24 DIAGNOSIS — I10 ESSENTIAL HYPERTENSION: Primary | ICD-10-CM

## 2023-03-24 DIAGNOSIS — R94.4 ABNORMAL RENAL FUNCTION TEST: ICD-10-CM

## 2023-03-24 PROCEDURE — 87077 CULTURE AEROBIC IDENTIFY: CPT

## 2023-03-24 PROCEDURE — 87086 URINE CULTURE/COLONY COUNT: CPT

## 2023-03-24 RX ORDER — LISINOPRIL 10 MG/1
10 TABLET ORAL DAILY
Qty: 90 TABLET | Refills: 1 | Status: SHIPPED | OUTPATIENT
Start: 2023-03-24

## 2023-03-25 LAB
CULTURE: ABNORMAL
CULTURE: ABNORMAL
Lab: ABNORMAL
SPECIMEN: ABNORMAL

## 2023-03-27 RX ORDER — CEPHALEXIN 500 MG/1
500 CAPSULE ORAL 3 TIMES DAILY
Qty: 9 CAPSULE | Refills: 0 | Status: SHIPPED | OUTPATIENT
Start: 2023-03-27 | End: 2023-03-30

## 2023-03-31 LAB
AMBIGUOUS ABBREVIATION: NORMAL
BUN / CREAT RATIO: 23 (ref 12–28)
BUN BLDV-MCNC: 25 MG/DL (ref 8–27)
CALCIUM SERPL-MCNC: 8.4 MG/DL (ref 8.7–10.3)
CHLORIDE BLD-SCNC: 104 MMOL/L (ref 96–106)
CO2: 23 MMOL/L (ref 20–29)
CREAT SERPL-MCNC: 1.09 MG/DL (ref 0.57–1)
ESTIMATED GLOMERULAR FILTRATION RATE CREATININE EQUATION: 53 ML/MIN/1.73
GLUCOSE BLD-MCNC: 92 MG/DL (ref 70–99)
POTASSIUM SERPL-SCNC: 4.6 MMOL/L (ref 3.5–5.2)
SODIUM BLD-SCNC: 141 MMOL/L (ref 134–144)

## 2023-05-01 ENCOUNTER — OFFICE VISIT (OUTPATIENT)
Dept: INTERNAL MEDICINE CLINIC | Age: 78
End: 2023-05-01
Payer: MEDICARE

## 2023-05-01 VITALS
OXYGEN SATURATION: 98 % | DIASTOLIC BLOOD PRESSURE: 70 MMHG | SYSTOLIC BLOOD PRESSURE: 110 MMHG | HEART RATE: 76 BPM | RESPIRATION RATE: 14 BRPM

## 2023-05-01 DIAGNOSIS — M10.072 ACUTE IDIOPATHIC GOUT INVOLVING TOE OF LEFT FOOT: ICD-10-CM

## 2023-05-01 PROCEDURE — 3074F SYST BP LT 130 MM HG: CPT | Performed by: INTERNAL MEDICINE

## 2023-05-01 PROCEDURE — 99213 OFFICE O/P EST LOW 20 MIN: CPT | Performed by: INTERNAL MEDICINE

## 2023-05-01 PROCEDURE — G8427 DOCREV CUR MEDS BY ELIG CLIN: HCPCS | Performed by: INTERNAL MEDICINE

## 2023-05-01 PROCEDURE — 1036F TOBACCO NON-USER: CPT | Performed by: INTERNAL MEDICINE

## 2023-05-01 PROCEDURE — 1090F PRES/ABSN URINE INCON ASSESS: CPT | Performed by: INTERNAL MEDICINE

## 2023-05-01 PROCEDURE — G8417 CALC BMI ABV UP PARAM F/U: HCPCS | Performed by: INTERNAL MEDICINE

## 2023-05-01 PROCEDURE — 1123F ACP DISCUSS/DSCN MKR DOCD: CPT | Performed by: INTERNAL MEDICINE

## 2023-05-01 PROCEDURE — G8399 PT W/DXA RESULTS DOCUMENT: HCPCS | Performed by: INTERNAL MEDICINE

## 2023-05-01 PROCEDURE — 3078F DIAST BP <80 MM HG: CPT | Performed by: INTERNAL MEDICINE

## 2023-05-01 RX ORDER — COLCHICINE 0.6 MG/1
0.6 TABLET ORAL 2 TIMES DAILY
Qty: 60 TABLET | Refills: 5 | Status: SHIPPED | OUTPATIENT
Start: 2023-05-01

## 2023-05-01 NOTE — PROGRESS NOTES
March Gaucher [facility]  1945 05/01/23    SUBJECTIVE:      Thursday pt developed pain and mild swelling on the left 1st MTP. This increased in size and became more painful. She had been on allopurinol and she started the colchicine Saturday with signrficant improvement, though there still is swelling. OBJECTIVE:    /70   Pulse 76   Resp 14   SpO2 98%     Physical Exam  Pain, swelling, warmth, tenderness left 1st MTP joint    ASSESSMENT:    1. Acute idiopathic gout involving toe of left foot        PLAN:    Celia was seen today for gout and discuss medications. Diagnoses and all orders for this visit:    Acute idiopathic gout involving toe of left foot - cont allopurinol, but also cont colchicine until symptoms have resolved. Current script of colchicine 5 yrs old - new script today  -     colchicine (COLCRYS) 0.6 MG tablet;  Take 1 tablet by mouth 2 times daily

## 2023-05-27 LAB
A/G RATIO: 1.6 (ref 1.2–2.2)
ALBUMIN SERPL-MCNC: 4.1 G/DL (ref 3.7–4.7)
ALP BLD-CCNC: 68 IU/L (ref 44–121)
ALT SERPL-CCNC: 12 IU/L (ref 0–32)
AST SERPL-CCNC: 16 IU/L (ref 0–40)
BASOPHILS ABSOLUTE: 0.1 X10E3/UL (ref 0–0.2)
BASOPHILS RELATIVE PERCENT: 1 %
BILIRUB SERPL-MCNC: 0.7 MG/DL (ref 0–1.2)
BUN / CREAT RATIO: 20 (ref 12–28)
BUN BLDV-MCNC: 22 MG/DL (ref 8–27)
CALCIUM SERPL-MCNC: 8.4 MG/DL (ref 8.7–10.3)
CHLORIDE BLD-SCNC: 102 MMOL/L (ref 96–106)
CHOLESTEROL, TOTAL: 171 MG/DL (ref 100–199)
CO2: 23 MMOL/L (ref 20–29)
COMMENT: NORMAL
CREAT SERPL-MCNC: 1.08 MG/DL (ref 0.57–1)
EOSINOPHILS ABSOLUTE: 0.5 X10E3/UL (ref 0–0.4)
EOSINOPHILS RELATIVE PERCENT: 6 %
ERYTHROCYTES, NUCLEATED/100 LEU: ABNORMAL
ESTIMATED GLOMERULAR FILTRATION RATE CREATININE EQUATION: 53 ML/MIN/1.73
GLOBULIN: 2.5 G/DL (ref 1.5–4.5)
GLUCOSE BLD-MCNC: 91 MG/DL (ref 70–99)
HCT VFR BLD CALC: 39.4 % (ref 34–46.6)
HDLC SERPL-MCNC: 57 MG/DL
HEMOGLOBIN: 13 G/DL (ref 11.1–15.9)
IMMATURE CELLS ABSOLUTE COUNT: ABNORMAL
IMMATURE GRANS (ABS): 0 X10E3/UL (ref 0–0.1)
IMMATURE GRANULOCYTES: 0 %
LDL CHOLESTEROL CALCULATED: 92 MG/DL (ref 0–99)
LYMPHOCYTES ABSOLUTE: 3.2 X10E3/UL (ref 0.7–3.1)
LYMPHOCYTES RELATIVE PERCENT: 37 %
MCH RBC QN AUTO: 30 PG (ref 26.6–33)
MCHC RBC AUTO-ENTMCNC: 33 G/DL (ref 31.5–35.7)
MCV RBC AUTO: 91 FL (ref 79–97)
MONOCYTES ABSOLUTE: 0.7 X10E3/UL (ref 0.1–0.9)
MONOCYTES RELATIVE PERCENT: 8 %
MORPHOLOGY: ABNORMAL
NEUTROPHILS ABSOLUTE: 4.2 X10E3/UL (ref 1.4–7)
PDW BLD-RTO: 12.2 % (ref 11.7–15.4)
PLATELET # BLD: 267 X10E3/UL (ref 150–450)
POTASSIUM SERPL-SCNC: 4.5 MMOL/L (ref 3.5–5.2)
RBC # BLD: 4.34 X10E6/UL (ref 3.77–5.28)
SEGMENTED NEUTROPHILS RELATIVE PERCENT: 48 %
SODIUM BLD-SCNC: 143 MMOL/L (ref 134–144)
TOTAL PROTEIN: 6.6 G/DL (ref 6–8.5)
TRIGL SERPL-MCNC: 122 MG/DL (ref 0–149)
TSH SERPL DL<=0.05 MIU/L-ACNC: 1.54 UIU/ML (ref 0.45–4.5)
VLDLC SERPL CALC-MCNC: 22 MG/DL (ref 5–40)
WBC # BLD: 8.7 X10E3/UL (ref 3.4–10.8)

## 2023-05-31 ENCOUNTER — OFFICE VISIT (OUTPATIENT)
Dept: INTERNAL MEDICINE CLINIC | Age: 78
End: 2023-05-31
Payer: MEDICARE

## 2023-05-31 VITALS
SYSTOLIC BLOOD PRESSURE: 128 MMHG | RESPIRATION RATE: 14 BRPM | HEART RATE: 68 BPM | OXYGEN SATURATION: 97 % | WEIGHT: 176 LBS | BODY MASS INDEX: 29.29 KG/M2 | DIASTOLIC BLOOD PRESSURE: 76 MMHG

## 2023-05-31 DIAGNOSIS — I10 ESSENTIAL HYPERTENSION: ICD-10-CM

## 2023-05-31 DIAGNOSIS — J45.991 COUGH VARIANT ASTHMA: ICD-10-CM

## 2023-05-31 DIAGNOSIS — E03.4 HYPOTHYROIDISM DUE TO ACQUIRED ATROPHY OF THYROID: Primary | ICD-10-CM

## 2023-05-31 DIAGNOSIS — E78.2 MIXED HYPERLIPIDEMIA: ICD-10-CM

## 2023-05-31 DIAGNOSIS — M1A.00X0 IDIOPATHIC CHRONIC GOUT WITHOUT TOPHUS, UNSPECIFIED SITE: ICD-10-CM

## 2023-05-31 PROCEDURE — G8427 DOCREV CUR MEDS BY ELIG CLIN: HCPCS | Performed by: INTERNAL MEDICINE

## 2023-05-31 PROCEDURE — 1090F PRES/ABSN URINE INCON ASSESS: CPT | Performed by: INTERNAL MEDICINE

## 2023-05-31 PROCEDURE — 1036F TOBACCO NON-USER: CPT | Performed by: INTERNAL MEDICINE

## 2023-05-31 PROCEDURE — G8417 CALC BMI ABV UP PARAM F/U: HCPCS | Performed by: INTERNAL MEDICINE

## 2023-05-31 PROCEDURE — 3078F DIAST BP <80 MM HG: CPT | Performed by: INTERNAL MEDICINE

## 2023-05-31 PROCEDURE — 1123F ACP DISCUSS/DSCN MKR DOCD: CPT | Performed by: INTERNAL MEDICINE

## 2023-05-31 PROCEDURE — G8399 PT W/DXA RESULTS DOCUMENT: HCPCS | Performed by: INTERNAL MEDICINE

## 2023-05-31 PROCEDURE — 3074F SYST BP LT 130 MM HG: CPT | Performed by: INTERNAL MEDICINE

## 2023-05-31 PROCEDURE — 99214 OFFICE O/P EST MOD 30 MIN: CPT | Performed by: INTERNAL MEDICINE

## 2023-05-31 RX ORDER — BUDESONIDE AND FORMOTEROL FUMARATE DIHYDRATE 160; 4.5 UG/1; UG/1
AEROSOL RESPIRATORY (INHALATION)
Qty: 30.6 G | Refills: 5 | Status: SHIPPED | OUTPATIENT
Start: 2023-05-31

## 2023-05-31 NOTE — PROGRESS NOTES
Zain Ortiz Profitt  1945 05/31/23    SUBJECTIVE:    She saw a doc at [facility] for the torticullis, will try botox again. Asthma doing well with the symbicort. The patient is taking hypertensive medications compliantly without side effects. Denies chest pain, dyspnea, edema, or TIA's. Pt continues on synthroid for hypothyroidism     She has had no gout in the last month. She is on allopurinol. Patient denies any chest pain, shortness of breath, myalgias, Patient is tolerating cholesterol medications without difficulty. OBJECTIVE:    /76   Pulse 68   Resp 14   Wt 176 lb (79.8 kg)   SpO2 97%   BMI 29.29 kg/m²     Physical Exam  Constitutional:       Appearance: She is well-developed. Eyes:      General: No scleral icterus. Conjunctiva/sclera: Conjunctivae normal.   Cardiovascular:      Rate and Rhythm: Normal rate and regular rhythm. Heart sounds: Normal heart sounds. No murmur heard. Pulmonary:      Effort: Pulmonary effort is normal. No respiratory distress. Breath sounds: Normal breath sounds. No wheezing. ASSESSMENT:    1. Hypothyroidism due to acquired atrophy of thyroid    2. Cough variant asthma    3. Idiopathic chronic gout without tophus, unspecified site    4. Mixed hyperlipidemia    5. Essential hypertension        PLAN:    Rona Clarke was seen today for hypothyroidism. Diagnoses and all orders for this visit:    Hypothyroidism due to acquired atrophy of thyroid - check TSH  -     TSH; Future    Cough variant asthma - cont symbicort which works well  -     budesonide-formoterol (SYMBICORT) 160-4.5 MCG/ACT AERO; INHALE 2 PUFFS EVERY 12 HOURS. after inhalation then gargle    Idiopathic chronic gout without tophus, unspecified site - cont allopurinol and PRN colchicine    Mixed hyperlipidemia - chec labs, cont pravastatin  -     Comprehensive Metabolic Panel; Future  -     Lipid Panel; Future    Essential hypertension - at goal; check labs  -     CBC;  Future  -

## 2023-06-05 RX ORDER — PRAVASTATIN SODIUM 40 MG
TABLET ORAL
Qty: 90 TABLET | Refills: 3 | Status: SHIPPED | OUTPATIENT
Start: 2023-06-05

## 2023-07-10 ENCOUNTER — TELEPHONE (OUTPATIENT)
Dept: INTERNAL MEDICINE CLINIC | Age: 78
End: 2023-07-10

## 2023-07-10 ENCOUNTER — OFFICE VISIT (OUTPATIENT)
Dept: INTERNAL MEDICINE CLINIC | Age: 78
End: 2023-07-10
Payer: MEDICARE

## 2023-07-10 VITALS
OXYGEN SATURATION: 98 % | RESPIRATION RATE: 18 BRPM | DIASTOLIC BLOOD PRESSURE: 78 MMHG | SYSTOLIC BLOOD PRESSURE: 110 MMHG | HEART RATE: 83 BPM

## 2023-07-10 DIAGNOSIS — M10.072 ACUTE IDIOPATHIC GOUT INVOLVING TOE OF LEFT FOOT: ICD-10-CM

## 2023-07-10 PROBLEM — N18.30 CHRONIC RENAL DISEASE, STAGE III (HCC): Status: ACTIVE | Noted: 2023-07-10

## 2023-07-10 PROCEDURE — 1090F PRES/ABSN URINE INCON ASSESS: CPT | Performed by: INTERNAL MEDICINE

## 2023-07-10 PROCEDURE — 1123F ACP DISCUSS/DSCN MKR DOCD: CPT | Performed by: INTERNAL MEDICINE

## 2023-07-10 PROCEDURE — 99213 OFFICE O/P EST LOW 20 MIN: CPT | Performed by: INTERNAL MEDICINE

## 2023-07-10 PROCEDURE — 1036F TOBACCO NON-USER: CPT | Performed by: INTERNAL MEDICINE

## 2023-07-10 PROCEDURE — 3078F DIAST BP <80 MM HG: CPT | Performed by: INTERNAL MEDICINE

## 2023-07-10 PROCEDURE — 3074F SYST BP LT 130 MM HG: CPT | Performed by: INTERNAL MEDICINE

## 2023-07-10 PROCEDURE — G8417 CALC BMI ABV UP PARAM F/U: HCPCS | Performed by: INTERNAL MEDICINE

## 2023-07-10 PROCEDURE — G8399 PT W/DXA RESULTS DOCUMENT: HCPCS | Performed by: INTERNAL MEDICINE

## 2023-07-10 PROCEDURE — G8427 DOCREV CUR MEDS BY ELIG CLIN: HCPCS | Performed by: INTERNAL MEDICINE

## 2023-07-10 RX ORDER — ALLOPURINOL 100 MG/1
TABLET ORAL
Qty: 180 TABLET | Refills: 3 | Status: SHIPPED | OUTPATIENT
Start: 2023-07-10

## 2023-07-10 NOTE — TELEPHONE ENCOUNTER
Spoke with patient. States she started colchicine on Saturday 07/08/23 with no benefit. Please advise. Thank you!

## 2023-07-10 NOTE — TELEPHONE ENCOUNTER
Patient called states the left foot on the side of her small toe is red and painful. States you discussed increasing her allopurinol if she had another problem. Please advise. Thank you!

## 2023-07-10 NOTE — PROGRESS NOTES
Shine Garnett Profitt  1945  07/10/23    SUBJECTIVE:      Friday pt had botox for the torticullis. Saturday she developed pain in the left lateral foot, and this was worse with ambulation. She had swelling and erythema. She has known gout so she took colchicine with 90% improvement. OBJECTIVE:    /78   Pulse 83   Resp 18   SpO2 98%     Physical Exam  Swelling erythema, tenderness left 5th TMT joint    ASSESSMENT:    1. Acute idiopathic gout involving toe of left foot        PLAN:    Celia was seen today for foot pain. Diagnoses and all orders for this visit:    Acute idiopathic gout involving toe of left foot - not a typical location of gout but this seems to be the diagnosis. Will cont colchicine bid for 1 week, and start allopurinol 200mg in 3 days (so there will be an overlap for 4 days).    -     allopurinol (ZYLOPRIM) 100 MG tablet; TAKE 2 TABLETS BY MOUTH EVERY DAY

## 2023-07-24 ENCOUNTER — TELEMEDICINE (OUTPATIENT)
Dept: INTERNAL MEDICINE CLINIC | Age: 78
End: 2023-07-24
Payer: MEDICARE

## 2023-07-24 DIAGNOSIS — U07.1 COVID-19: Primary | ICD-10-CM

## 2023-07-24 PROCEDURE — 99213 OFFICE O/P EST LOW 20 MIN: CPT | Performed by: INTERNAL MEDICINE

## 2023-07-24 PROCEDURE — G8399 PT W/DXA RESULTS DOCUMENT: HCPCS | Performed by: INTERNAL MEDICINE

## 2023-07-24 PROCEDURE — 1123F ACP DISCUSS/DSCN MKR DOCD: CPT | Performed by: INTERNAL MEDICINE

## 2023-07-24 PROCEDURE — G8427 DOCREV CUR MEDS BY ELIG CLIN: HCPCS | Performed by: INTERNAL MEDICINE

## 2023-07-24 PROCEDURE — 1090F PRES/ABSN URINE INCON ASSESS: CPT | Performed by: INTERNAL MEDICINE

## 2023-07-24 RX ORDER — PREDNISONE 10 MG/1
TABLET ORAL
Qty: 20 TABLET | Refills: 0 | Status: SHIPPED | OUTPATIENT
Start: 2023-07-24

## 2023-07-24 NOTE — PROGRESS NOTES
BY MOUTH ONCE DAILY Yes Cristiane Leary MD   spironolactone (ALDACTONE) 50 MG tablet TAKE 1 TABLET BY MOUTH DAILY Yes Cristiane Leary MD   ipratropium-albuterol (DUONEB) 0.5-2.5 (3) MG/3ML SOLN nebulizer solution Inhale 3 mLs into the lungs every 6 hours Yes Cristiane Leary MD   Cholecalciferol (VITAMIN D3) 50 MCG (2000 UT) CAPS Take 2 capsules by mouth daily Yes Cristiane Leary MD       Social History     Tobacco Use    Smoking status: Never    Smokeless tobacco: Never    Tobacco comments:     reviewed 8/23/16   Vaping Use    Vaping Use: Never used   Substance Use Topics    Alcohol use: Yes     Alcohol/week: 0.0 standard drinks     Comment: wine 2x month, rarely    Drug use: No            PHYSICAL EXAMINATION:  Constitutional: [x] Appears well-developed and well-nourished [x] No apparent distress      [] Abnormal-   Mental status  [x] Alert and awake  [x] Oriented to person/place/time [x]Able to follow commands             Psychiatric:       [x] Normal Affect [x] No Hallucinations      ASSESSMENT/PLAN:  1. COVID-19 - start paxlovid - hold pravastatin and sensipar for 10 days. Will also start prednisone. Discussed isolation, discussed indications for ER      No follow-ups on file. Ananda Pereira, was evaluated through a synchronous (real-time) audio-video encounter. The patient (or guardian if applicable) is aware that this is a billable service, which includes applicable co-pays. This Virtual Visit was conducted with patient's (and/or legal guardian's) consent. Patient identification was verified, and a caregiver was present when appropriate.    The patient was located at Home: [address] [facility]  Provider was located at [facility] (Appt Dept): [address] [place] [address] [address]        Total time spent on this encounter: Not billed by time    --Cristiane Leary MD on 7/24/2023 at 4:37 PM    An electronic signature was used to authenticate this

## 2023-09-01 NOTE — TELEPHONE ENCOUNTER
48 hour holter result:     Result Notes     Notes recorded by Blenda Bernheim, MD on 11/1/2018 at 1:01 PM EDT  Normal 48 hr holter , Min Hr was 50 , max was 104 , average 70, no afib or arrhythmias recorded, 4 beat svt run noted  No diary reported or provided. Continue current meds            Left msg with results on pt's voicemail, per communication form.   Results faxed to PCP 8/15/23

## 2023-09-19 RX ORDER — METOPROLOL SUCCINATE 50 MG/1
TABLET, EXTENDED RELEASE ORAL
Qty: 90 TABLET | Refills: 3 | Status: SHIPPED | OUTPATIENT
Start: 2023-09-19

## 2023-09-22 DIAGNOSIS — I10 ESSENTIAL HYPERTENSION: ICD-10-CM

## 2023-09-22 RX ORDER — SPIRONOLACTONE 50 MG/1
50 TABLET, FILM COATED ORAL DAILY
Qty: 90 TABLET | Refills: 3 | Status: SHIPPED | OUTPATIENT
Start: 2023-09-22

## 2023-10-02 DIAGNOSIS — E03.4 HYPOTHYROIDISM DUE TO ACQUIRED ATROPHY OF THYROID: ICD-10-CM

## 2023-10-02 RX ORDER — LEVOTHYROXINE SODIUM 0.15 MG/1
TABLET ORAL
Qty: 90 TABLET | Refills: 1 | Status: SHIPPED | OUTPATIENT
Start: 2023-10-02

## 2023-10-25 DIAGNOSIS — I10 ESSENTIAL HYPERTENSION: ICD-10-CM

## 2023-10-25 RX ORDER — LISINOPRIL 10 MG/1
10 TABLET ORAL DAILY
Qty: 90 TABLET | Refills: 1 | Status: SHIPPED | OUTPATIENT
Start: 2023-10-25

## 2023-11-27 ENCOUNTER — OFFICE VISIT (OUTPATIENT)
Dept: INTERNAL MEDICINE CLINIC | Age: 78
End: 2023-11-27
Payer: MEDICARE

## 2023-11-27 VITALS
WEIGHT: 176 LBS | OXYGEN SATURATION: 96 % | BODY MASS INDEX: 29.29 KG/M2 | HEART RATE: 77 BPM | SYSTOLIC BLOOD PRESSURE: 112 MMHG | RESPIRATION RATE: 18 BRPM | DIASTOLIC BLOOD PRESSURE: 74 MMHG

## 2023-11-27 DIAGNOSIS — M1A.00X0 IDIOPATHIC CHRONIC GOUT WITHOUT TOPHUS, UNSPECIFIED SITE: ICD-10-CM

## 2023-11-27 DIAGNOSIS — N18.31 STAGE 3A CHRONIC KIDNEY DISEASE (HCC): ICD-10-CM

## 2023-11-27 DIAGNOSIS — E78.2 MIXED HYPERLIPIDEMIA: Primary | ICD-10-CM

## 2023-11-27 DIAGNOSIS — J45.30 MILD PERSISTENT ASTHMA WITHOUT COMPLICATION: ICD-10-CM

## 2023-11-27 DIAGNOSIS — I10 ESSENTIAL HYPERTENSION: ICD-10-CM

## 2023-11-27 DIAGNOSIS — E21.3 HYPERPARATHYROIDISM (HCC): ICD-10-CM

## 2023-11-27 DIAGNOSIS — E03.4 HYPOTHYROIDISM DUE TO ACQUIRED ATROPHY OF THYROID: ICD-10-CM

## 2023-11-27 PROCEDURE — G8417 CALC BMI ABV UP PARAM F/U: HCPCS | Performed by: INTERNAL MEDICINE

## 2023-11-27 PROCEDURE — 99214 OFFICE O/P EST MOD 30 MIN: CPT | Performed by: INTERNAL MEDICINE

## 2023-11-27 PROCEDURE — G8427 DOCREV CUR MEDS BY ELIG CLIN: HCPCS | Performed by: INTERNAL MEDICINE

## 2023-11-27 PROCEDURE — G8399 PT W/DXA RESULTS DOCUMENT: HCPCS | Performed by: INTERNAL MEDICINE

## 2023-11-27 PROCEDURE — 1036F TOBACCO NON-USER: CPT | Performed by: INTERNAL MEDICINE

## 2023-11-27 PROCEDURE — 1123F ACP DISCUSS/DSCN MKR DOCD: CPT | Performed by: INTERNAL MEDICINE

## 2023-11-27 PROCEDURE — 1090F PRES/ABSN URINE INCON ASSESS: CPT | Performed by: INTERNAL MEDICINE

## 2023-11-27 PROCEDURE — 3074F SYST BP LT 130 MM HG: CPT | Performed by: INTERNAL MEDICINE

## 2023-11-27 PROCEDURE — G8484 FLU IMMUNIZE NO ADMIN: HCPCS | Performed by: INTERNAL MEDICINE

## 2023-11-27 PROCEDURE — 3078F DIAST BP <80 MM HG: CPT | Performed by: INTERNAL MEDICINE

## 2023-11-27 RX ORDER — MAGNESIUM GLUCONATE 30 MG(550)
30 TABLET ORAL 2 TIMES DAILY
COMMUNITY

## 2023-11-28 LAB
A/G RATIO: 1.8 (ref 1.2–2.2)
ALBUMIN SERPL-MCNC: 4.3 G/DL (ref 3.8–4.8)
ALP BLD-CCNC: 68 IU/L (ref 44–121)
ALT SERPL-CCNC: 15 IU/L (ref 0–32)
AST SERPL-CCNC: 18 IU/L (ref 0–40)
BASOPHILS ABSOLUTE: 0.1 X10E3/UL (ref 0–0.2)
BASOPHILS RELATIVE PERCENT: 1 %
BILIRUB SERPL-MCNC: 0.7 MG/DL (ref 0–1.2)
BUN / CREAT RATIO: 29 (ref 12–28)
BUN BLDV-MCNC: 26 MG/DL (ref 8–27)
CALCIUM SERPL-MCNC: 11 MG/DL (ref 8.7–10.3)
CHLORIDE BLD-SCNC: 106 MMOL/L (ref 96–106)
CHOLESTEROL, TOTAL: 181 MG/DL (ref 100–199)
CO2: 22 MMOL/L (ref 20–29)
COMMENT: NORMAL
CREAT SERPL-MCNC: 0.89 MG/DL (ref 0.57–1)
EOSINOPHILS ABSOLUTE: 0.5 X10E3/UL (ref 0–0.4)
EOSINOPHILS RELATIVE PERCENT: 5 %
ERYTHROCYTES, NUCLEATED/100 LEU: ABNORMAL
ESTIMATED GLOMERULAR FILTRATION RATE CREATININE EQUATION: 67 ML/MIN/1.73
GLOBULIN: 2.4 G/DL (ref 1.5–4.5)
GLUCOSE BLD-MCNC: 86 MG/DL (ref 70–99)
HCT VFR BLD CALC: 38.3 % (ref 34–46.6)
HDLC SERPL-MCNC: 65 MG/DL
HEMOGLOBIN: 12.9 G/DL (ref 11.1–15.9)
IMMATURE CELLS ABSOLUTE COUNT: ABNORMAL
IMMATURE GRANS (ABS): 0 X10E3/UL (ref 0–0.1)
IMMATURE GRANULOCYTES: 0 %
LDL CHOLESTEROL CALCULATED: 93 MG/DL (ref 0–99)
LYMPHOCYTES ABSOLUTE: 3 X10E3/UL (ref 0.7–3.1)
LYMPHOCYTES RELATIVE PERCENT: 30 %
MCH RBC QN AUTO: 30.6 PG (ref 26.6–33)
MCHC RBC AUTO-ENTMCNC: 33.7 G/DL (ref 31.5–35.7)
MCV RBC AUTO: 91 FL (ref 79–97)
MONOCYTES ABSOLUTE: 0.6 X10E3/UL (ref 0.1–0.9)
MONOCYTES RELATIVE PERCENT: 6 %
MORPHOLOGY: ABNORMAL
NEUTROPHILS ABSOLUTE: 6 X10E3/UL (ref 1.4–7)
PDW BLD-RTO: 12.3 % (ref 11.7–15.4)
PLATELET # BLD: 289 X10E3/UL (ref 150–450)
POTASSIUM SERPL-SCNC: 4.7 MMOL/L (ref 3.5–5.2)
RBC # BLD: 4.21 X10E6/UL (ref 3.77–5.28)
SEGMENTED NEUTROPHILS RELATIVE PERCENT: 58 %
SODIUM BLD-SCNC: 142 MMOL/L (ref 134–144)
TOTAL PROTEIN: 6.7 G/DL (ref 6–8.5)
TRIGL SERPL-MCNC: 134 MG/DL (ref 0–149)
TSH SERPL DL<=0.05 MIU/L-ACNC: 2.02 UIU/ML (ref 0.45–4.5)
VLDLC SERPL CALC-MCNC: 23 MG/DL (ref 5–40)
WBC # BLD: 10.2 X10E3/UL (ref 3.4–10.8)

## 2023-11-30 RX ORDER — TIZANIDINE 4 MG/1
TABLET ORAL
Qty: 90 TABLET | Refills: 5 | Status: SHIPPED | OUTPATIENT
Start: 2023-11-30

## 2023-12-28 ENCOUNTER — TELEPHONE (OUTPATIENT)
Dept: INTERNAL MEDICINE CLINIC | Age: 78
End: 2023-12-28

## 2023-12-28 DIAGNOSIS — R22.1 NECK MASS: Primary | ICD-10-CM

## 2023-12-28 NOTE — TELEPHONE ENCOUNTER
[place] was going to get the patient in today but [facility] and [facility] there ordered an [address] of the neck and she is scheduled to do this today at [facility] .

## 2023-12-28 NOTE — TELEPHONE ENCOUNTER
The patient called in stating that she was seen on the 20th for left side neck swelling and she said that she was told if it did not get any better you would order some test for it . She said it is no better and now the right side is swollen as well and it is painful and it is even affecting her swallowing .  Please advise !!

## 2023-12-29 ENCOUNTER — TELEPHONE (OUTPATIENT)
Dept: INTERNAL MEDICINE CLINIC | Age: 78
End: 2023-12-29

## 2023-12-29 DIAGNOSIS — R22.1 NECK MASS: Primary | ICD-10-CM

## 2023-12-29 NOTE — TELEPHONE ENCOUNTER
The patient called in wanting to know if you could see the US fabian did on her neck yesterday ? She said it is still very swollen and she was not sure if she needed an appt to be seen ?

## 2024-01-16 ENCOUNTER — OFFICE VISIT (OUTPATIENT)
Dept: INTERNAL MEDICINE CLINIC | Age: 79
End: 2024-01-16
Payer: MEDICARE

## 2024-01-16 VITALS
SYSTOLIC BLOOD PRESSURE: 124 MMHG | HEIGHT: 65 IN | OXYGEN SATURATION: 98 % | DIASTOLIC BLOOD PRESSURE: 70 MMHG | HEART RATE: 72 BPM | BODY MASS INDEX: 29.16 KG/M2 | TEMPERATURE: 97.4 F | WEIGHT: 175 LBS

## 2024-01-16 VITALS
BODY MASS INDEX: 29.29 KG/M2 | WEIGHT: 176 LBS | HEART RATE: 72 BPM | OXYGEN SATURATION: 98 % | SYSTOLIC BLOOD PRESSURE: 124 MMHG | DIASTOLIC BLOOD PRESSURE: 70 MMHG

## 2024-01-16 DIAGNOSIS — Z00.00 MEDICARE ANNUAL WELLNESS VISIT, SUBSEQUENT: Primary | ICD-10-CM

## 2024-01-16 DIAGNOSIS — K11.9 PAROTID DISCOMFORT: Primary | ICD-10-CM

## 2024-01-16 DIAGNOSIS — I10 ESSENTIAL HYPERTENSION: ICD-10-CM

## 2024-01-16 DIAGNOSIS — E78.2 MIXED HYPERLIPIDEMIA: ICD-10-CM

## 2024-01-16 DIAGNOSIS — E03.4 HYPOTHYROIDISM DUE TO ACQUIRED ATROPHY OF THYROID: ICD-10-CM

## 2024-01-16 DIAGNOSIS — E21.3 HYPERPARATHYROIDISM (HCC): ICD-10-CM

## 2024-01-16 PROCEDURE — 1123F ACP DISCUSS/DSCN MKR DOCD: CPT | Performed by: INTERNAL MEDICINE

## 2024-01-16 PROCEDURE — G8417 CALC BMI ABV UP PARAM F/U: HCPCS | Performed by: INTERNAL MEDICINE

## 2024-01-16 PROCEDURE — G8484 FLU IMMUNIZE NO ADMIN: HCPCS | Performed by: INTERNAL MEDICINE

## 2024-01-16 PROCEDURE — 3078F DIAST BP <80 MM HG: CPT | Performed by: INTERNAL MEDICINE

## 2024-01-16 PROCEDURE — 1090F PRES/ABSN URINE INCON ASSESS: CPT | Performed by: INTERNAL MEDICINE

## 2024-01-16 PROCEDURE — G8399 PT W/DXA RESULTS DOCUMENT: HCPCS | Performed by: INTERNAL MEDICINE

## 2024-01-16 PROCEDURE — 3074F SYST BP LT 130 MM HG: CPT | Performed by: INTERNAL MEDICINE

## 2024-01-16 PROCEDURE — G0439 PPPS, SUBSEQ VISIT: HCPCS | Performed by: INTERNAL MEDICINE

## 2024-01-16 PROCEDURE — 1036F TOBACCO NON-USER: CPT | Performed by: INTERNAL MEDICINE

## 2024-01-16 PROCEDURE — 99213 OFFICE O/P EST LOW 20 MIN: CPT | Performed by: INTERNAL MEDICINE

## 2024-01-16 PROCEDURE — G8427 DOCREV CUR MEDS BY ELIG CLIN: HCPCS | Performed by: INTERNAL MEDICINE

## 2024-01-16 ASSESSMENT — PATIENT HEALTH QUESTIONNAIRE - PHQ9
2. FEELING DOWN, DEPRESSED OR HOPELESS: 0
SUM OF ALL RESPONSES TO PHQ QUESTIONS 1-9: 0
1. LITTLE INTEREST OR PLEASURE IN DOING THINGS: 0
SUM OF ALL RESPONSES TO PHQ QUESTIONS 1-9: 0
1. LITTLE INTEREST OR PLEASURE IN DOING THINGS: 0
SUM OF ALL RESPONSES TO PHQ QUESTIONS 1-9: 0
SUM OF ALL RESPONSES TO PHQ9 QUESTIONS 1 & 2: 0
SUM OF ALL RESPONSES TO PHQ QUESTIONS 1-9: 0
SUM OF ALL RESPONSES TO PHQ9 QUESTIONS 1 & 2: 0
2. FEELING DOWN, DEPRESSED OR HOPELESS: 0

## 2024-01-16 ASSESSMENT — LIFESTYLE VARIABLES
HOW OFTEN DO YOU HAVE A DRINK CONTAINING ALCOHOL: NEVER
HOW MANY STANDARD DRINKS CONTAINING ALCOHOL DO YOU HAVE ON A TYPICAL DAY: PATIENT DOES NOT DRINK

## 2024-01-16 NOTE — PROGRESS NOTES
Celia S Profitt  1945 01/16/24    SUBJECTIVE:    Pt complains that her glands still feel swollen. There is no pain. At times the glands are mor enlarged than other times. She denies dry mouth. US 12/28 was symmetric homogenous appearance of the parotid glands. CT neck did not slow enlarged parotid glands or LAD. She denies F/C.     She has been taking sensipar ever other day for the hyperPTH.    OBJECTIVE:    /70 (Site: Left Upper Arm, Position: Sitting, Cuff Size: Medium Adult)   Pulse 72   Wt 79.8 kg (176 lb)   SpO2 98%   BMI 29.29 kg/m²     Physical Exam  No enlarged LAD, no enlarged arotic gland.     ASSESSMENT:    1. Parotid discomfort    2. Mixed hyperlipidemia    3. Hypothyroidism due to acquired atrophy of thyroid    4. Essential hypertension    5. Hyperparathyroidism (HCC)        PLAN:    Celia was seen today for edema.    Diagnoses and all orders for this visit:    Parotid discomfort - exam (-), CT and UA (-). She has minimal discomfort. For now I think we should watch symptoms, but if there is worsening we will refer to ENT    Mixed hyperlipidemia  -     CBC; Future  -     Comprehensive Metabolic Panel; Future  -     Lipid Panel; Future  -     TSH; Future    Hypothyroidism due to acquired atrophy of thyroid  -     CBC; Future  -     Comprehensive Metabolic Panel; Future  -     Lipid Panel; Future  -     TSH; Future    Essential hypertension  -     CBC; Future  -     Comprehensive Metabolic Panel; Future  -     Lipid Panel; Future  -     TSH; Future    Hyperparathyroidism (HCC)  -     PTH, Intact; Future

## 2024-01-16 NOTE — PROGRESS NOTES
Medicare Annual Wellness Visit    Celia Gavin is here for Medicare AWV    Assessment & Plan   Medicare annual wellness visit, subsequent  Recommendations for Preventive Services Due: see orders and patient instructions/AVS.  Recommended screening schedule for the next 5-10 years is provided to the patient in written form: see Patient Instructions/AVS.     No follow-ups on file.     Subjective       Patient's complete Health Risk Assessment and screening values have been reviewed and are found in Flowsheets. The following problems were reviewed today and where indicated follow up appointments were made and/or referrals ordered.    Positive Risk Factor Screenings with Interventions:                Activity, Diet, and Weight:  On average, how many days per week do you engage in moderate to strenuous exercise (like a brisk walk)?: 0 days  On average, how many minutes do you engage in exercise at this level?: 0 min    Do you eat balanced/healthy meals regularly?: Yes    Body mass index is 29.12 kg/m².      Inactivity Interventions:  See AVS for additional education material            ADL's:   Patient reports needing help with:  Select all that apply: (!) Transportation ()  Interventions:  Patient comments: patient states her  drives her, since she no longer drives.  Patient declined any further interventions or treatment                  Objective   Vitals:    01/16/24 1124   BP: 124/70   Pulse: 72   Temp: 97.4 °F (36.3 °C)   SpO2: 98%   Weight: 79.4 kg (175 lb)   Height: 1.651 m (5' 5\")      Body mass index is 29.12 kg/m².               Allergies   Allergen Reactions    Medrol [Methylprednisolone] Palpitations    Ofloxacin Hives    Prednisone Other (See Comments)     Prior to Visit Medications    Medication Sig Taking? Authorizing Provider   cinacalcet (SENSIPAR) 30 MG tablet Take 1 tablet by mouth every other day Yes Provider, MD Jacek   tiZANidine (ZANAFLEX) 4 MG tablet TAKE 1 TABLET BY MOUTH

## 2024-01-16 NOTE — PATIENT INSTRUCTIONS
bed to a chair.  Button or unbutton a shirt or sweater.  Remove and put on your shoes.  It's common to feel a little worried or anxious if you find you can't do all the things you used to be able to do. Talking with your doctor about ADLs is a way to make sure you're as safe as possible and able to care for yourself as well as you can. You may want to bring a caregiver, friend, or family member to your checkup. They can help you talk to your doctor.  Follow-up care is a key part of your treatment and safety. Be sure to make and go to all appointments, and call your doctor if you are having problems. It's also a good idea to know your test results and keep a list of the medicines you take.  Current as of: February 26, 2023               Content Version: 13.9  © 2006-2023 [employer].   Care instructions adapted under license by [employer]. If you have questions about a medical condition or this instruction, always ask your healthcare professional. [employer] disclaims any warranty or liability for your use of this information.           A Healthy Heart: Care Instructions  Your Care Instructions     Coronary artery disease, also called heart disease, occurs when a substance called plaque builds up in the vessels that supply oxygen-rich blood to your heart muscle. This can narrow the blood vessels and reduce blood flow. A heart attack happens when blood flow is completely blocked. A high-fat diet, smoking, and other factors increase the risk of heart disease.  Your doctor has found that you have a chance of having heart disease. You can do lots of things to keep your heart healthy. It may not be easy, but you can change your diet, exercise more, and quit smoking. These steps really work to lower your chance of heart disease.  Follow-up care is a key part of your treatment and safety. Be sure to make and go to all appointments, and call your doctor if you are having problems. It's also a

## 2024-02-14 ENCOUNTER — TELEPHONE (OUTPATIENT)
Dept: INTERNAL MEDICINE CLINIC | Age: 79
End: 2024-02-14

## 2024-02-14 NOTE — TELEPHONE ENCOUNTER
The patient called in about her swollen glands. She said the swelling had went down but it is not back on her left side . She said they are swollen pretty bad and she said her neck is sore all the way up to her left ear and was wanting to know if she should come in to be seen or if she need to be referred out to another physician ?

## 2024-02-15 ENCOUNTER — OFFICE VISIT (OUTPATIENT)
Dept: INTERNAL MEDICINE CLINIC | Age: 79
End: 2024-02-15
Payer: MEDICARE

## 2024-02-15 VITALS
OXYGEN SATURATION: 98 % | BODY MASS INDEX: 29.49 KG/M2 | DIASTOLIC BLOOD PRESSURE: 80 MMHG | SYSTOLIC BLOOD PRESSURE: 132 MMHG | WEIGHT: 177.2 LBS | HEART RATE: 60 BPM

## 2024-02-15 DIAGNOSIS — K11.1 ENLARGED PAROTID GLAND: Primary | ICD-10-CM

## 2024-02-15 PROCEDURE — 3079F DIAST BP 80-89 MM HG: CPT | Performed by: INTERNAL MEDICINE

## 2024-02-15 PROCEDURE — 3075F SYST BP GE 130 - 139MM HG: CPT | Performed by: INTERNAL MEDICINE

## 2024-02-15 PROCEDURE — G8417 CALC BMI ABV UP PARAM F/U: HCPCS | Performed by: INTERNAL MEDICINE

## 2024-02-15 PROCEDURE — 1090F PRES/ABSN URINE INCON ASSESS: CPT | Performed by: INTERNAL MEDICINE

## 2024-02-15 PROCEDURE — 99213 OFFICE O/P EST LOW 20 MIN: CPT | Performed by: INTERNAL MEDICINE

## 2024-02-15 PROCEDURE — 1123F ACP DISCUSS/DSCN MKR DOCD: CPT | Performed by: INTERNAL MEDICINE

## 2024-02-15 PROCEDURE — 1036F TOBACCO NON-USER: CPT | Performed by: INTERNAL MEDICINE

## 2024-02-15 PROCEDURE — G8427 DOCREV CUR MEDS BY ELIG CLIN: HCPCS | Performed by: INTERNAL MEDICINE

## 2024-02-15 PROCEDURE — G8399 PT W/DXA RESULTS DOCUMENT: HCPCS | Performed by: INTERNAL MEDICINE

## 2024-02-15 PROCEDURE — G8484 FLU IMMUNIZE NO ADMIN: HCPCS | Performed by: INTERNAL MEDICINE

## 2024-02-15 NOTE — PROGRESS NOTES
Celia S Profitt  1945  02/15/24    SUBJECTIVE:      Monday pt woke with left ear pain which resolved, but she also had swelling of the area below the left ear. There is discomfort with chewing.     She denies F/C, sore throat, N/V. She has not treated this.    She had swelling of the same area 1 month ago, but ct was (-) and the swelling resolved.     OBJECTIVE:    /80 (Site: Left Upper Arm, Position: Sitting, Cuff Size: Large Adult)   Pulse 60   Wt 80.4 kg (177 lb 3.2 oz)   SpO2 98%   BMI 29.49 kg/m²     Physical Exam  Mild swelling of the parotid gland.    ASSESSMENT:    1. Enlarged parotid gland        PLAN:    Celia was seen today for facial swelling.    Diagnoses and all orders for this visit:    Enlarged parotid gland - CT was (-) but she may have an obstruction of the duct causing intermittent swelling. I will ask Dr Mercado to see the pt in consultation  -     Amb External Referral To ENT

## 2024-05-20 DIAGNOSIS — E03.4 HYPOTHYROIDISM DUE TO ACQUIRED ATROPHY OF THYROID: ICD-10-CM

## 2024-05-20 RX ORDER — LEVOTHYROXINE SODIUM 0.15 MG/1
150 TABLET ORAL DAILY
Qty: 90 TABLET | Refills: 1 | Status: SHIPPED | OUTPATIENT
Start: 2024-05-20

## 2024-05-21 SDOH — ECONOMIC STABILITY: FOOD INSECURITY: WITHIN THE PAST 12 MONTHS, YOU WORRIED THAT YOUR FOOD WOULD RUN OUT BEFORE YOU GOT MONEY TO BUY MORE.: NEVER TRUE

## 2024-05-21 SDOH — ECONOMIC STABILITY: FOOD INSECURITY: WITHIN THE PAST 12 MONTHS, THE FOOD YOU BOUGHT JUST DIDN'T LAST AND YOU DIDN'T HAVE MONEY TO GET MORE.: NEVER TRUE

## 2024-05-21 SDOH — ECONOMIC STABILITY: INCOME INSECURITY: HOW HARD IS IT FOR YOU TO PAY FOR THE VERY BASICS LIKE FOOD, HOUSING, MEDICAL CARE, AND HEATING?: NOT HARD AT ALL

## 2024-05-21 SDOH — ECONOMIC STABILITY: TRANSPORTATION INSECURITY
IN THE PAST 12 MONTHS, HAS LACK OF TRANSPORTATION KEPT YOU FROM MEETINGS, WORK, OR FROM GETTING THINGS NEEDED FOR DAILY LIVING?: NO

## 2024-05-22 LAB
ALBUMIN SERPL-MCNC: 4.2 G/DL
ALP BLD-CCNC: 71 U/L
ALT SERPL-CCNC: 13 U/L
ANION GAP SERPL CALCULATED.3IONS-SCNC: NORMAL MMOL/L
AST SERPL-CCNC: 15 U/L
BASOPHILS ABSOLUTE: 0.1 /ΜL
BASOPHILS RELATIVE PERCENT: 1 %
BILIRUB SERPL-MCNC: 0.8 MG/DL (ref 0.1–1.4)
BUN BLDV-MCNC: 21 MG/DL
CALCIUM SERPL-MCNC: 9.7 MG/DL
CHLORIDE BLD-SCNC: 105 MMOL/L
CHOLESTEROL, TOTAL: 184 MG/DL
CHOLESTEROL/HDL RATIO: NORMAL
CO2: NORMAL
CREAT SERPL-MCNC: 0.99 MG/DL
EGFR: 59
EOSINOPHILS ABSOLUTE: 0.4 /ΜL
EOSINOPHILS RELATIVE PERCENT: 4 %
GLUCOSE BLD-MCNC: 98 MG/DL
HCT VFR BLD CALC: 41.1 % (ref 36–46)
HDLC SERPL-MCNC: 57 MG/DL (ref 35–70)
HEMOGLOBIN: 13.7 G/DL (ref 12–16)
LDL CHOLESTEROL CALCULATED: 99 MG/DL (ref 0–160)
LYMPHOCYTES ABSOLUTE: 3.6 /ΜL
LYMPHOCYTES RELATIVE PERCENT: 39 %
MCH RBC QN AUTO: 31.1 PG
MCHC RBC AUTO-ENTMCNC: 33.3 G/DL
MCV RBC AUTO: 93 FL
MONOCYTES ABSOLUTE: 0.6 /ΜL
MONOCYTES RELATIVE PERCENT: 7 %
NEUTROPHILS ABSOLUTE: 4.4 /ΜL
NEUTROPHILS RELATIVE PERCENT: 49 %
NONHDLC SERPL-MCNC: NORMAL MG/DL
PLATELET # BLD: 300 K/ΜL
PMV BLD AUTO: NORMAL FL
POTASSIUM SERPL-SCNC: 4.4 MMOL/L
RBC # BLD: 4.41 10^6/ΜL
SODIUM BLD-SCNC: 141 MMOL/L
TOTAL PROTEIN: 6.5
TRIGL SERPL-MCNC: 165 MG/DL
TSH SERPL DL<=0.05 MIU/L-ACNC: NORMAL M[IU]/L
VLDLC SERPL CALC-MCNC: 28 MG/DL
WBC # BLD: 9 10^3/ML

## 2024-05-24 ENCOUNTER — OFFICE VISIT (OUTPATIENT)
Dept: INTERNAL MEDICINE CLINIC | Age: 79
End: 2024-05-24

## 2024-05-24 VITALS
BODY MASS INDEX: 29.56 KG/M2 | OXYGEN SATURATION: 99 % | SYSTOLIC BLOOD PRESSURE: 104 MMHG | HEIGHT: 65 IN | DIASTOLIC BLOOD PRESSURE: 72 MMHG | WEIGHT: 177.4 LBS | HEART RATE: 78 BPM

## 2024-05-24 DIAGNOSIS — J45.30 MILD PERSISTENT ASTHMA WITHOUT COMPLICATION: ICD-10-CM

## 2024-05-24 DIAGNOSIS — E78.2 MIXED HYPERLIPIDEMIA: ICD-10-CM

## 2024-05-24 DIAGNOSIS — E03.4 HYPOTHYROIDISM DUE TO ACQUIRED ATROPHY OF THYROID: Primary | ICD-10-CM

## 2024-05-24 DIAGNOSIS — E21.3 HYPERPARATHYROIDISM (HCC): ICD-10-CM

## 2024-05-24 DIAGNOSIS — M10.072 ACUTE IDIOPATHIC GOUT INVOLVING TOE OF LEFT FOOT: ICD-10-CM

## 2024-05-24 DIAGNOSIS — I10 ESSENTIAL HYPERTENSION: ICD-10-CM

## 2024-05-24 PROBLEM — N18.30 CHRONIC RENAL DISEASE, STAGE III (HCC): Status: RESOLVED | Noted: 2023-07-10 | Resolved: 2024-05-24

## 2024-05-24 NOTE — PROGRESS NOTES
Celia S Profitt  1945 05/24/24    SUBJECTIVE:    Pt was seen at [facility] for the primary hyperPTN. She does not need Tx at this point other than sensipar. She will have repeat labs in August.    She continues to struggle with her torticullis, may try a different botox.    The patient is taking hypertensive medications compliantly without side effects.  Denies chest pain, dyspnea, edema, or TIAs.    Patient denies any chest pain, shortness of breath, myalgias, Patient is tolerating cholesterol medications without difficulty.      Pt continues on synthroid for hypothyroidism     She has had no episodes of gout.    She uses the inhalers intermittently, aracely in the warm months.    OBJECTIVE:    /72 (Site: Right Upper Arm, Position: Sitting, Cuff Size: Large Adult)   Pulse 78   Ht 1.651 m (5' 5\")   Wt 80.5 kg (177 lb 6.4 oz)   SpO2 99%   BMI 29.52 kg/m²     Physical Exam  Constitutional:       Appearance: She is well-developed.   Eyes:      General: No scleral icterus.     Conjunctiva/sclera: Conjunctivae normal.   Cardiovascular:      Rate and Rhythm: Normal rate and regular rhythm.      Heart sounds: Normal heart sounds. No murmur heard.  Pulmonary:      Effort: Pulmonary effort is normal. No respiratory distress.      Breath sounds: Normal breath sounds. No wheezing.         ASSESSMENT:    1. Hypothyroidism due to acquired atrophy of thyroid    2. Mixed hyperlipidemia    3. Essential hypertension    4. Hyperparathyroidism (HCC)    5. Acute idiopathic gout involving toe of left foot    6. Mild persistent asthma without complication        PLAN:    Celia was seen today for 6 month follow-up.    Diagnoses and all orders for this visit:    Hypothyroidism due to acquired atrophy of thyroid - at goal  -     TSH; Future    Mixed hyperlipidemia - reviewed labs; no change in mgmt  -     CBC; Future  -     Comprehensive Metabolic Panel; Future  -     Lipid Panel; Future    Essential hypertension - at goal; no

## 2024-05-31 ENCOUNTER — TELEPHONE (OUTPATIENT)
Dept: INTERNAL MEDICINE CLINIC | Age: 79
End: 2024-05-31

## 2024-05-31 DIAGNOSIS — I10 ESSENTIAL HYPERTENSION: ICD-10-CM

## 2024-05-31 RX ORDER — LISINOPRIL 10 MG/1
10 TABLET ORAL DAILY
Qty: 90 TABLET | Refills: 1 | Status: SHIPPED | OUTPATIENT
Start: 2024-05-31

## 2024-06-11 LAB — MAMMOGRAPHY, EXTERNAL: NORMAL

## 2024-10-02 RX ORDER — PRAVASTATIN SODIUM 40 MG
TABLET ORAL
Qty: 90 TABLET | Refills: 3 | Status: SHIPPED | OUTPATIENT
Start: 2024-10-02

## 2024-11-12 DIAGNOSIS — J45.991 COUGH VARIANT ASTHMA: ICD-10-CM

## 2024-11-12 RX ORDER — BUDESONIDE AND FORMOTEROL FUMARATE DIHYDRATE 160; 4.5 UG/1; UG/1
AEROSOL RESPIRATORY (INHALATION)
Qty: 30.6 G | Refills: 5 | Status: SHIPPED | OUTPATIENT
Start: 2024-11-12

## 2024-11-16 LAB
ALBUMIN: 4.1 G/DL
ALP BLD-CCNC: 71 U/L
ALT SERPL-CCNC: 13 U/L
ANION GAP SERPL CALCULATED.3IONS-SCNC: NORMAL MMOL/L
AST SERPL-CCNC: 17 U/L
BASOPHILS ABSOLUTE: 0 /ΜL
BASOPHILS RELATIVE PERCENT: 0 %
BILIRUB SERPL-MCNC: 0.9 MG/DL (ref 0.1–1.4)
BUN BLDV-MCNC: 22 MG/DL
CALCIUM SERPL-MCNC: 8.5 MG/DL
CHLORIDE BLD-SCNC: 103 MMOL/L
CHOLESTEROL, TOTAL: 184 MG/DL
CHOLESTEROL/HDL RATIO: NORMAL
CO2: 24 MMOL/L
CREAT SERPL-MCNC: 0.94 MG/DL
EOSINOPHILS ABSOLUTE: 0.4 /ΜL
EOSINOPHILS RELATIVE PERCENT: 5 %
GFR, ESTIMATED: 62
GLUCOSE BLD-MCNC: 93 MG/DL
HCT VFR BLD CALC: 40.7 % (ref 36–46)
HDLC SERPL-MCNC: 56 MG/DL (ref 35–70)
HEMOGLOBIN: 13.6 G/DL (ref 12–16)
LDL CHOLESTEROL: 93
LYMPHOCYTES ABSOLUTE: 3.2 /ΜL
LYMPHOCYTES RELATIVE PERCENT: 35 %
MCH RBC QN AUTO: 31.5 PG
MCHC RBC AUTO-ENTMCNC: 33.4 G/DL
MCV RBC AUTO: 94 FL
MONOCYTES ABSOLUTE: 0.7 /ΜL
MONOCYTES RELATIVE PERCENT: 7 %
NEUTROPHILS ABSOLUTE: 4.8 /ΜL
NEUTROPHILS RELATIVE PERCENT: 53 %
NONHDLC SERPL-MCNC: NORMAL MG/DL
PLATELET # BLD: 301 K/ΜL
PMV BLD AUTO: NORMAL FL
POTASSIUM SERPL-SCNC: 4.4 MMOL/L
RBC # BLD: 4.32 10^6/ΜL
SODIUM BLD-SCNC: 142 MMOL/L
TOTAL PROTEIN: 6.8 G/DL (ref 6.4–8.2)
TRIGL SERPL-MCNC: 205 MG/DL
TSH SERPL DL<=0.05 MIU/L-ACNC: 2.79 UIU/ML
VLDLC SERPL CALC-MCNC: 35 MG/DL
WBC # BLD: 9.1 10^3/ML

## 2024-11-18 DIAGNOSIS — E03.4 HYPOTHYROIDISM DUE TO ACQUIRED ATROPHY OF THYROID: ICD-10-CM

## 2024-11-18 DIAGNOSIS — E78.2 MIXED HYPERLIPIDEMIA: ICD-10-CM

## 2024-11-18 DIAGNOSIS — I10 ESSENTIAL HYPERTENSION: ICD-10-CM

## 2024-11-21 ENCOUNTER — OFFICE VISIT (OUTPATIENT)
Dept: INTERNAL MEDICINE CLINIC | Age: 79
End: 2024-11-21

## 2024-11-21 VITALS
OXYGEN SATURATION: 98 % | SYSTOLIC BLOOD PRESSURE: 110 MMHG | DIASTOLIC BLOOD PRESSURE: 64 MMHG | BODY MASS INDEX: 29.55 KG/M2 | HEART RATE: 97 BPM | WEIGHT: 177.6 LBS

## 2024-11-21 DIAGNOSIS — J45.991 COUGH VARIANT ASTHMA: Primary | ICD-10-CM

## 2024-11-21 DIAGNOSIS — E03.4 HYPOTHYROIDISM DUE TO ACQUIRED ATROPHY OF THYROID: ICD-10-CM

## 2024-11-21 DIAGNOSIS — I10 ESSENTIAL HYPERTENSION: ICD-10-CM

## 2024-11-21 DIAGNOSIS — E78.2 MIXED HYPERLIPIDEMIA: ICD-10-CM

## 2024-11-21 NOTE — PROGRESS NOTES
Celia ASTUDILLO Profitt  1945 11/21/24    SUBJECTIVE:      The patient is taking hypertensive medications compliantly without side effects.  Denies chest pain, dyspnea, edema, or TIA's.     Patient denies any chest pain, shortness of breath, myalgias, Patient is tolerating cholesterol medications without difficulty.      Pt continues on synthroid for hypothyroidism     She has had no recent gout.    She occasionally has cough and wheezing. She is intermittent with the inhalers.     OBJECTIVE:    /64   Pulse 97   Wt 80.6 kg (177 lb 9.6 oz)   SpO2 98%   BMI 29.55 kg/m²     Physical Exam  Constitutional:       Appearance: She is well-developed.   Eyes:      General: No scleral icterus.     Conjunctiva/sclera: Conjunctivae normal.   Neck:      Thyroid: No thyromegaly.      Trachea: No tracheal deviation.   Cardiovascular:      Rate and Rhythm: Normal rate and regular rhythm.      Heart sounds: No murmur heard.     No friction rub. No gallop.   Pulmonary:      Effort: No respiratory distress.      Breath sounds: No wheezing or rales.   Abdominal:      General: Bowel sounds are normal. There is no distension.      Palpations: Abdomen is soft. There is no hepatomegaly or mass.      Tenderness: There is no abdominal tenderness. There is no guarding or rebound.   Musculoskeletal:      Cervical back: Neck supple.   Lymphadenopathy:      Cervical: No cervical adenopathy.   Skin:     Nails: There is no clubbing.   Neurological:      Mental Status: She is alert and oriented to person, place, and time.   Psychiatric:         Behavior: Behavior normal.         Judgment: Judgment normal.         ASSESSMENT:    1. Cough variant asthma    2. Essential hypertension    3. Hypothyroidism due to acquired atrophy of thyroid    4. Mixed hyperlipidemia        PLAN:    Celia was seen today for 6 month follow-up.    Diagnoses and all orders for this visit:    Cough variant asthma-encouraged daily use of the Symbicort    Essential

## 2024-11-22 ENCOUNTER — TELEPHONE (OUTPATIENT)
Dept: INTERNAL MEDICINE CLINIC | Age: 79
End: 2024-11-22

## 2024-11-22 NOTE — TELEPHONE ENCOUNTER
The patient said she was told that she will not be able to see anyone for her leg until December 17th and wanted to know if that is ok or should she be seen sooner for it ?

## 2024-12-02 DIAGNOSIS — E03.4 HYPOTHYROIDISM DUE TO ACQUIRED ATROPHY OF THYROID: ICD-10-CM

## 2024-12-02 DIAGNOSIS — M10.072 ACUTE IDIOPATHIC GOUT INVOLVING TOE OF LEFT FOOT: ICD-10-CM

## 2024-12-02 DIAGNOSIS — I10 ESSENTIAL HYPERTENSION: ICD-10-CM

## 2024-12-02 RX ORDER — METOPROLOL SUCCINATE 50 MG/1
TABLET, EXTENDED RELEASE ORAL
Qty: 90 TABLET | Refills: 3 | Status: SHIPPED | OUTPATIENT
Start: 2024-12-02

## 2024-12-02 RX ORDER — LEVOTHYROXINE SODIUM 150 UG/1
150 TABLET ORAL DAILY
Qty: 90 TABLET | Refills: 1 | Status: SHIPPED | OUTPATIENT
Start: 2024-12-02

## 2024-12-02 RX ORDER — LISINOPRIL 10 MG/1
10 TABLET ORAL DAILY
Qty: 90 TABLET | Refills: 1 | Status: SHIPPED | OUTPATIENT
Start: 2024-12-02

## 2024-12-02 RX ORDER — ALLOPURINOL 100 MG/1
TABLET ORAL
Qty: 180 TABLET | Refills: 3 | Status: SHIPPED | OUTPATIENT
Start: 2024-12-02

## 2024-12-23 DIAGNOSIS — I10 ESSENTIAL HYPERTENSION: ICD-10-CM

## 2024-12-23 RX ORDER — SPIRONOLACTONE 50 MG/1
50 TABLET, FILM COATED ORAL DAILY
Qty: 90 TABLET | Refills: 3 | Status: SHIPPED | OUTPATIENT
Start: 2024-12-23

## 2025-05-16 LAB
ALBUMIN: 4.2 G/DL
ALP BLD-CCNC: 66 U/L
ALT SERPL-CCNC: 12 U/L
ANION GAP SERPL CALCULATED.3IONS-SCNC: NORMAL MMOL/L
AST SERPL-CCNC: 19 U/L
BASOPHILS ABSOLUTE: 0.1 /ΜL
BASOPHILS RELATIVE PERCENT: 1 %
BILIRUB SERPL-MCNC: 0.9 MG/DL (ref 0.1–1.4)
BUN BLDV-MCNC: 26 MG/DL
CALCIUM SERPL-MCNC: 8.3 MG/DL
CHLORIDE BLD-SCNC: 102 MMOL/L
CHOLESTEROL, TOTAL: 187 MG/DL
CHOLESTEROL/HDL RATIO: NORMAL
CO2: 22 MMOL/L
CREAT SERPL-MCNC: 0.94 MG/DL
EOSINOPHILS ABSOLUTE: 0.4 /ΜL
EOSINOPHILS RELATIVE PERCENT: 4 %
GFR, ESTIMATED: 62
GLUCOSE BLD-MCNC: 92 MG/DL
HCT VFR BLD CALC: 40.7 % (ref 36–46)
HDLC SERPL-MCNC: 60 MG/DL (ref 35–70)
HEMOGLOBIN: 13.2 G/DL (ref 12–16)
LDL CHOLESTEROL: 101
LYMPHOCYTES ABSOLUTE: 3 /ΜL
LYMPHOCYTES RELATIVE PERCENT: 32 %
MCH RBC QN AUTO: 30.4 PG
MCHC RBC AUTO-ENTMCNC: 32.4 G/DL
MCV RBC AUTO: 94 FL
MONOCYTES ABSOLUTE: 0.6 /ΜL
MONOCYTES RELATIVE PERCENT: 6 %
NEUTROPHILS ABSOLUTE: 5.4 /ΜL
NEUTROPHILS RELATIVE PERCENT: 57 %
NONHDLC SERPL-MCNC: NORMAL MG/DL
PLATELET # BLD: 283 K/ΜL
PMV BLD AUTO: NORMAL FL
POTASSIUM SERPL-SCNC: 4.3 MMOL/L
RBC # BLD: 4.34 10^6/ΜL
SODIUM BLD-SCNC: 139 MMOL/L
TOTAL PROTEIN: 6.7 G/DL (ref 6.4–8.2)
TRIGL SERPL-MCNC: 150 MG/DL
VLDLC SERPL CALC-MCNC: 26 MG/DL
WBC # BLD: 9.4 10^3/ML

## 2025-05-18 SDOH — ECONOMIC STABILITY: FOOD INSECURITY: WITHIN THE PAST 12 MONTHS, THE FOOD YOU BOUGHT JUST DIDN'T LAST AND YOU DIDN'T HAVE MONEY TO GET MORE.: NEVER TRUE

## 2025-05-18 SDOH — ECONOMIC STABILITY: TRANSPORTATION INSECURITY
IN THE PAST 12 MONTHS, HAS THE LACK OF TRANSPORTATION KEPT YOU FROM MEDICAL APPOINTMENTS OR FROM GETTING MEDICATIONS?: NO

## 2025-05-18 SDOH — ECONOMIC STABILITY: INCOME INSECURITY: IN THE LAST 12 MONTHS, WAS THERE A TIME WHEN YOU WERE NOT ABLE TO PAY THE MORTGAGE OR RENT ON TIME?: NO

## 2025-05-18 SDOH — ECONOMIC STABILITY: FOOD INSECURITY: WITHIN THE PAST 12 MONTHS, YOU WORRIED THAT YOUR FOOD WOULD RUN OUT BEFORE YOU GOT MONEY TO BUY MORE.: NEVER TRUE

## 2025-05-19 ENCOUNTER — TELEPHONE (OUTPATIENT)
Dept: INTERNAL MEDICINE CLINIC | Age: 80
End: 2025-05-19

## 2025-05-20 ENCOUNTER — RESULTS FOLLOW-UP (OUTPATIENT)
Dept: INTERNAL MEDICINE CLINIC | Age: 80
End: 2025-05-20

## 2025-05-20 ASSESSMENT — PATIENT HEALTH QUESTIONNAIRE - PHQ9
SUM OF ALL RESPONSES TO PHQ QUESTIONS 1-9: 0
SUM OF ALL RESPONSES TO PHQ QUESTIONS 1-9: 0
1. LITTLE INTEREST OR PLEASURE IN DOING THINGS: NOT AT ALL
SUM OF ALL RESPONSES TO PHQ QUESTIONS 1-9: 0
2. FEELING DOWN, DEPRESSED OR HOPELESS: NOT AT ALL
SUM OF ALL RESPONSES TO PHQ QUESTIONS 1-9: 0
2. FEELING DOWN, DEPRESSED OR HOPELESS: NOT AT ALL
1. LITTLE INTEREST OR PLEASURE IN DOING THINGS: NOT AT ALL
SUM OF ALL RESPONSES TO PHQ9 QUESTIONS 1 & 2: 0

## 2025-05-21 ENCOUNTER — OFFICE VISIT (OUTPATIENT)
Dept: INTERNAL MEDICINE CLINIC | Age: 80
End: 2025-05-21
Payer: MEDICARE

## 2025-05-21 VITALS
HEART RATE: 74 BPM | WEIGHT: 176.8 LBS | DIASTOLIC BLOOD PRESSURE: 64 MMHG | OXYGEN SATURATION: 98 % | SYSTOLIC BLOOD PRESSURE: 114 MMHG | BODY MASS INDEX: 29.42 KG/M2

## 2025-05-21 DIAGNOSIS — J45.30 MILD PERSISTENT ASTHMA WITHOUT COMPLICATION: ICD-10-CM

## 2025-05-21 DIAGNOSIS — E03.4 HYPOTHYROIDISM DUE TO ACQUIRED ATROPHY OF THYROID: ICD-10-CM

## 2025-05-21 DIAGNOSIS — E78.2 MIXED HYPERLIPIDEMIA: ICD-10-CM

## 2025-05-21 DIAGNOSIS — E21.3 HYPERPARATHYROIDISM: ICD-10-CM

## 2025-05-21 DIAGNOSIS — J45.991 COUGH VARIANT ASTHMA: Primary | ICD-10-CM

## 2025-05-21 DIAGNOSIS — I10 ESSENTIAL HYPERTENSION: ICD-10-CM

## 2025-05-21 PROCEDURE — G8427 DOCREV CUR MEDS BY ELIG CLIN: HCPCS | Performed by: INTERNAL MEDICINE

## 2025-05-21 PROCEDURE — 3078F DIAST BP <80 MM HG: CPT | Performed by: INTERNAL MEDICINE

## 2025-05-21 PROCEDURE — G8417 CALC BMI ABV UP PARAM F/U: HCPCS | Performed by: INTERNAL MEDICINE

## 2025-05-21 PROCEDURE — 1036F TOBACCO NON-USER: CPT | Performed by: INTERNAL MEDICINE

## 2025-05-21 PROCEDURE — G8399 PT W/DXA RESULTS DOCUMENT: HCPCS | Performed by: INTERNAL MEDICINE

## 2025-05-21 PROCEDURE — 1123F ACP DISCUSS/DSCN MKR DOCD: CPT | Performed by: INTERNAL MEDICINE

## 2025-05-21 PROCEDURE — 99214 OFFICE O/P EST MOD 30 MIN: CPT | Performed by: INTERNAL MEDICINE

## 2025-05-21 PROCEDURE — 1090F PRES/ABSN URINE INCON ASSESS: CPT | Performed by: INTERNAL MEDICINE

## 2025-05-21 PROCEDURE — G2211 COMPLEX E/M VISIT ADD ON: HCPCS | Performed by: INTERNAL MEDICINE

## 2025-05-21 PROCEDURE — 3074F SYST BP LT 130 MM HG: CPT | Performed by: INTERNAL MEDICINE

## 2025-05-21 PROCEDURE — 1159F MED LIST DOCD IN RCRD: CPT | Performed by: INTERNAL MEDICINE

## 2025-05-21 RX ORDER — FLUTICASONE PROPIONATE 50 MCG
2 SPRAY, SUSPENSION (ML) NASAL DAILY
Qty: 16 G | Refills: 5 | Status: SHIPPED | OUTPATIENT
Start: 2025-05-21

## 2025-05-21 NOTE — PROGRESS NOTES
Celia S Profitt  1945 05/21/25    SUBJECTIVE:    Pt was not using the inhaler regularly. She started using the inhaler with some benefit, but she has noticed that she continues too cough aracely in the morning when it is productive of yellow mucous. She has occas SOB with exertion and occas wheezing aracely at night. Wheezing has resolved with the inhaler. She denies PND, does have rhinorrhea. She denies HB, reflux.     The patient is taking hypertensive medications compliantly without side effects.  Denies chest pain,  edema, or TIA's.     Patient denies any chest pain, myalgias, Patient is tolerating cholesterol medications without difficulty.     Pt continues on synthroid for hypothyroidism     Pt has had no episodes of gout.     OBJECTIVE:    /64 (BP Site: Left Upper Arm, Patient Position: Sitting, BP Cuff Size: Large Adult)   Pulse 74   Wt 80.2 kg (176 lb 12.8 oz)   SpO2 98%   BMI 29.42 kg/m²     Physical Exam  Constitutional:       Appearance: She is well-developed.   Eyes:      General: No scleral icterus.     Conjunctiva/sclera: Conjunctivae normal.   Cardiovascular:      Rate and Rhythm: Normal rate and regular rhythm.      Heart sounds: Normal heart sounds. No murmur heard.  Pulmonary:      Effort: Pulmonary effort is normal. No respiratory distress.      Breath sounds: Normal breath sounds. No wheezing.         ASSESSMENT:    1. Cough variant asthma    2. Hypothyroidism due to acquired atrophy of thyroid    3. Mild persistent asthma without complication    4. Essential hypertension    5. Mixed hyperlipidemia    6. Hyperparathyroidism        PLAN:    Celia was seen today for 6 month follow-up, cough and other.    Diagnoses and all orders for this visit:    Cough variant asthma-encouraged the patient to use antihistamine and Flonase in addition to the Symbicort regularly.  If symptoms persist she will call the office for a referral to pulmonology.  Of note, her diagnosis of cough variant asthma

## 2025-06-23 ENCOUNTER — RESULTS FOLLOW-UP (OUTPATIENT)
Dept: INTERNAL MEDICINE CLINIC | Age: 80
End: 2025-06-23

## 2025-06-23 LAB
MAMMOGRAPHY, EXTERNAL: NORMAL
MAMMOGRAPHY, EXTERNAL: NORMAL

## 2025-06-24 DIAGNOSIS — M10.072 ACUTE IDIOPATHIC GOUT INVOLVING TOE OF LEFT FOOT: ICD-10-CM

## 2025-06-24 RX ORDER — COLCHICINE 0.6 MG/1
0.6 TABLET ORAL 2 TIMES DAILY
Qty: 60 TABLET | Refills: 5 | Status: SHIPPED | OUTPATIENT
Start: 2025-06-24

## 2025-07-02 ENCOUNTER — TELEPHONE (OUTPATIENT)
Dept: INTERNAL MEDICINE CLINIC | Age: 80
End: 2025-07-02

## 2025-07-02 NOTE — TELEPHONE ENCOUNTER
The patient said she have been on the colchicine bid for 7 days and she said is she now suppose to start the allopurinol 2 tabs by mouth daily ?

## 2025-07-21 ENCOUNTER — OFFICE VISIT (OUTPATIENT)
Dept: INTERNAL MEDICINE CLINIC | Age: 80
End: 2025-07-21

## 2025-07-21 VITALS
HEART RATE: 79 BPM | SYSTOLIC BLOOD PRESSURE: 138 MMHG | OXYGEN SATURATION: 96 % | DIASTOLIC BLOOD PRESSURE: 72 MMHG | BODY MASS INDEX: 29.26 KG/M2 | WEIGHT: 175.6 LBS | HEIGHT: 65 IN

## 2025-07-21 VITALS
HEIGHT: 65 IN | SYSTOLIC BLOOD PRESSURE: 138 MMHG | WEIGHT: 175.6 LBS | HEART RATE: 79 BPM | BODY MASS INDEX: 29.26 KG/M2 | OXYGEN SATURATION: 96 % | DIASTOLIC BLOOD PRESSURE: 72 MMHG

## 2025-07-21 DIAGNOSIS — Z00.00 MEDICARE ANNUAL WELLNESS VISIT, SUBSEQUENT: Primary | ICD-10-CM

## 2025-07-21 DIAGNOSIS — M10.071 ACUTE IDIOPATHIC GOUT OF RIGHT FOOT: Primary | ICD-10-CM

## 2025-07-21 ASSESSMENT — PATIENT HEALTH QUESTIONNAIRE - PHQ9
SUM OF ALL RESPONSES TO PHQ QUESTIONS 1-9: 0
1. LITTLE INTEREST OR PLEASURE IN DOING THINGS: NOT AT ALL
SUM OF ALL RESPONSES TO PHQ QUESTIONS 1-9: 0
2. FEELING DOWN, DEPRESSED OR HOPELESS: NOT AT ALL

## 2025-07-21 ASSESSMENT — LIFESTYLE VARIABLES
HOW OFTEN DO YOU HAVE A DRINK CONTAINING ALCOHOL: MONTHLY OR LESS
HOW MANY STANDARD DRINKS CONTAINING ALCOHOL DO YOU HAVE ON A TYPICAL DAY: 1 OR 2

## 2025-07-21 NOTE — PROGRESS NOTES
Medicare Annual Wellness Visit    Celia Gavin is here for Medicare AWV    Assessment & Plan   Medicare annual wellness visit, subsequent     No follow-ups on file.     Subjective       Patient's complete Health Risk Assessment and screening values have been reviewed and are found in Flowsheets. The following problems were reviewed today and where indicated follow up appointments were made and/or referrals ordered.    Positive Risk Factor Screenings with Interventions:             General HRA Questions:  Select all that apply: (!) New or Increased Pain  Interventions - Pain:  Patient comments: She has gout. Managed by Dr. Dave, that was her reasoning for coming in today.  Patient declined any further interventions or treatment      Inactivity:  On average, how many days per week do you engage in moderate to strenuous exercise (like a brisk walk)?: 0 days (No sidewalks by her but she is going to start walking her dog.) (!) Abnormal  On average, how many minutes do you engage in exercise at this level?: 0 min  Interventions:  Patient comments: She talked about possibly starting to go walking her dog with her .   Patient declined any further interventions or treatment                          Objective   Vitals:    07/21/25 1525   BP: 138/72   Pulse: 79   SpO2: 96%   Weight: 79.7 kg (175 lb 9.6 oz)   Height: 1.651 m (5' 5\")      Body mass index is 29.22 kg/m².                    Allergies   Allergen Reactions    Medrol [Methylprednisolone] Palpitations    Ofloxacin Hives    Prednisone Other (See Comments)     Prior to Visit Medications    Medication Sig Taking? Authorizing Provider   tiZANidine (ZANAFLEX) 4 MG tablet TAKE 1 TABLET BY MOUTH THREE TIMES DAILY AS NEEDED for muscle pain Yes Jimmy Dave MD   colchicine (COLCRYS) 0.6 MG tablet TAKE 1 TABLET BY MOUTH TWICE DAILY Yes Jimmy Dave MD   fluticasone (FLONASE) 50 MCG/ACT nasal spray 2 sprays by Each Nostril route daily Yes

## 2025-07-21 NOTE — PROGRESS NOTES
Celia S Profitt  1945 07/21/25    SUBJECTIVE:    Recent DEXA showed mild osteopenia.    Pt with pain, erythema, warmth, tenderness on the right lateral foot.     She had similar symptoms 2 weeks ago, started on colchicine with improvement. She then started on allopurinol, but yesterday the symptoms recurred.     She has a h/o gout.     OBJECTIVE:    /72   Pulse 79   Ht 1.651 m (5' 5\")   Wt 79.7 kg (175 lb 9.6 oz)   SpO2 96%   BMI 29.22 kg/m²     Physical Exam  Tenderness, erythema minimal warmth right 1st TMT joint      ASSESSMENT:    1. Acute idiopathic gout of right foot        PLAN:    Celia was seen today for gout.    Diagnoses and all orders for this visit:    Acute idiopathic gout of right foot-seems to be another episode of acute gout.  She will take colchicine twice daily for 1 week, then add allopurinol 200 mg daily to the colchicine for the following week, then discontinue the colchicine.

## 2025-07-21 NOTE — PATIENT INSTRUCTIONS
problems such as diabetes, high blood pressure, and high cholesterol. If you think you may have a problem with alcohol or drug use, talk to your doctor.   Medicines    Take your medicines exactly as prescribed. Call your doctor if you think you are having a problem with your medicine.     If your doctor recommends aspirin, take the amount directed each day. Make sure you take aspirin and not another kind of pain reliever, such as acetaminophen (Tylenol).   When should you call for help?   Call 911 if you have symptoms of a heart attack. These may include:    Chest pain or pressure, or a strange feeling in the chest.     Sweating.     Shortness of breath.     Pain, pressure, or a strange feeling in the back, neck, jaw, or upper belly or in one or both shoulders or arms.     Lightheadedness or sudden weakness.     A fast or irregular heartbeat.   After you call 911, the  may tell you to chew 1 adult-strength or 2 to 4 low-dose aspirin. Wait for an ambulance. Do not try to drive yourself.  Watch closely for changes in your health, and be sure to contact your doctor if you have any problems.  Where can you learn more?  Go to https://www.[employer].net/patientEd and enter F075 to learn more about \"A Healthy Heart: Care Instructions.\"  Current as of: July 31, 2024  Content Version: 14.5  © 7005-2543 [employer].   Care instructions adapted under license by [employer]. If you have questions about a medical condition or this instruction, always ask your healthcare professional. [employer], disclaims any warranty or liability for your use of this information.    Personalized Preventive Plan for Celia Flahertyt - 7/21/2025  Medicare offers a range of preventive health benefits. Some of the tests and screenings are paid in full while other may be subject to a deductible, co-insurance, and/or copay.  Some of these benefits include a comprehensive review of your medical history including

## 2025-07-25 ENCOUNTER — TELEPHONE (OUTPATIENT)
Dept: INTERNAL MEDICINE CLINIC | Age: 80
End: 2025-07-25

## 2025-07-25 ENCOUNTER — OFFICE VISIT (OUTPATIENT)
Dept: INTERNAL MEDICINE CLINIC | Age: 80
End: 2025-07-25

## 2025-07-25 VITALS
HEART RATE: 81 BPM | OXYGEN SATURATION: 96 % | BODY MASS INDEX: 28.82 KG/M2 | WEIGHT: 173.2 LBS | RESPIRATION RATE: 18 BRPM

## 2025-07-25 DIAGNOSIS — M79.605 PAIN IN BOTH LOWER EXTREMITIES: Primary | ICD-10-CM

## 2025-07-25 DIAGNOSIS — M79.604 PAIN IN BOTH LOWER EXTREMITIES: Primary | ICD-10-CM

## 2025-07-25 DIAGNOSIS — M10.072 ACUTE IDIOPATHIC GOUT INVOLVING TOE OF LEFT FOOT: ICD-10-CM

## 2025-07-25 RX ORDER — PREDNISONE 20 MG/1
20 TABLET ORAL DAILY
Qty: 14 TABLET | Refills: 0 | Status: SHIPPED | OUTPATIENT
Start: 2025-07-25 | End: 2025-08-08

## 2025-07-25 RX ORDER — COLCHICINE 0.6 MG/1
0.6 TABLET ORAL 2 TIMES DAILY PRN
Qty: 60 TABLET | Refills: 5
Start: 2025-07-25

## 2025-07-25 NOTE — PROGRESS NOTES
Celia ASTUDILLO Profitt  1945 07/25/25    SUBJECTIVE:      Pts foot has improved considerably with the colchicine. She still has mild erythema. Yesterday she developed pain in the knee bilat. This is located in the superior patella bilat. This is worse with standing from a seated position, taking a big step up. Walking does not trigger the pain. Tylenol provides some benefit.     OBJECTIVE:    Pulse 81   Resp 18   Wt 78.6 kg (173 lb 3.2 oz)   SpO2 96%   BMI 28.82 kg/m²     Physical Exam    Pain with palpation of of distal quadriceps insertion into the patella bilat.     ASSESSMENT:    1. Pain in both lower extremities    2. Acute idiopathic gout involving toe of left foot        PLAN:    Diagnoses and all orders for this visit:    Pain in both lower extremities-I do not think this is related to the gout directly.  I am concerned that this pain is in the distal quadriceps, and I suspect this is from the combination of the colchicine with the pravastatin.  I will stop the pravastatin for 2 weeks.  I will stop the colchicine.  I will put her on a prednisone taper to continue to treat the gout.  She will start allopurinol in 1 week.    Acute idiopathic gout involving toe of left foot  -     colchicine (COLCRYS) 0.6 MG tablet; Take 1 tablet by mouth 2 times daily as needed for Pain    Other orders  -     predniSONE (DELTASONE) 20 MG tablet; Take 1 tablet by mouth daily for 14 days

## 2025-07-29 ENCOUNTER — TELEPHONE (OUTPATIENT)
Dept: INTERNAL MEDICINE CLINIC | Age: 80
End: 2025-07-29

## 2025-07-29 DIAGNOSIS — M25.562 ACUTE PAIN OF LEFT KNEE: Primary | ICD-10-CM

## 2025-07-29 NOTE — TELEPHONE ENCOUNTER
The patient said she stepped down off the step and she hurt her left knee and wanted to know if she could see Dr Garcia or if she would need imaging first ?

## 2025-08-13 DIAGNOSIS — I10 ESSENTIAL HYPERTENSION: ICD-10-CM

## 2025-08-13 RX ORDER — LISINOPRIL 10 MG/1
10 TABLET ORAL DAILY
Qty: 90 TABLET | Refills: 1 | Status: SHIPPED | OUTPATIENT
Start: 2025-08-13